# Patient Record
Sex: FEMALE | Race: BLACK OR AFRICAN AMERICAN | Employment: FULL TIME | URBAN - METROPOLITAN AREA
[De-identification: names, ages, dates, MRNs, and addresses within clinical notes are randomized per-mention and may not be internally consistent; named-entity substitution may affect disease eponyms.]

---

## 2017-01-05 RX ORDER — HYDROCHLOROTHIAZIDE 25 MG/1
TABLET ORAL
Qty: 30 TAB | Refills: 1 | Status: SHIPPED | OUTPATIENT
Start: 2017-01-05 | End: 2017-04-16 | Stop reason: SDUPTHER

## 2017-02-27 ENCOUNTER — OFFICE VISIT (OUTPATIENT)
Dept: INTERNAL MEDICINE CLINIC | Age: 57
End: 2017-02-27

## 2017-02-27 VITALS
HEIGHT: 63 IN | BODY MASS INDEX: 32.99 KG/M2 | TEMPERATURE: 98.1 F | SYSTOLIC BLOOD PRESSURE: 140 MMHG | RESPIRATION RATE: 16 BRPM | HEART RATE: 84 BPM | OXYGEN SATURATION: 97 % | WEIGHT: 186.2 LBS | DIASTOLIC BLOOD PRESSURE: 86 MMHG

## 2017-02-27 DIAGNOSIS — R05.9 COUGH: ICD-10-CM

## 2017-02-27 DIAGNOSIS — J01.10 ACUTE NON-RECURRENT FRONTAL SINUSITIS: Primary | ICD-10-CM

## 2017-02-27 RX ORDER — ALBUTEROL SULFATE 90 UG/1
2 AEROSOL, METERED RESPIRATORY (INHALATION)
Qty: 1 INHALER | Refills: 0 | Status: SHIPPED | OUTPATIENT
Start: 2017-02-27 | End: 2017-05-08 | Stop reason: ALTCHOICE

## 2017-02-27 RX ORDER — AMOXICILLIN AND CLAVULANATE POTASSIUM 875; 125 MG/1; MG/1
1 TABLET, FILM COATED ORAL 2 TIMES DAILY
Qty: 14 TAB | Refills: 0 | Status: SHIPPED | OUTPATIENT
Start: 2017-02-27 | End: 2017-03-06

## 2017-02-27 NOTE — PROGRESS NOTES
Chief Complaint   Patient presents with    Chills    Croup    Headache     Reviewed record in preparation for visit and have obtained necessary documentation. Identified pt with two pt identifiers(name and ).       Health Maintenance Due   Topic    Hepatitis C Screening     FOOT EXAM Q1     Pneumococcal 19-64 Medium Risk (1 of 1 - PPSV23)    FOBT Q 1 YEAR AGE 50-75     PAP AKA CERVICAL CYTOLOGY     INFLUENZA AGE 9 TO ADULT     BREAST CANCER SCRN MAMMOGRAM     HEMOGLOBIN A1C Q6M          Chief Complaint   Patient presents with    Chills    Croup    Headache        Wt Readings from Last 3 Encounters:   17 186 lb 3.2 oz (84.5 kg)   16 190 lb (86.2 kg)   16 193 lb (87.5 kg)     Temp Readings from Last 3 Encounters:   17 98.1 °F (36.7 °C) (Oral)   16 97.8 °F (36.6 °C) (Oral)   16 97.8 °F (36.6 °C) (Oral)     BP Readings from Last 3 Encounters:   17 140/86   16 132/90   16 131/87     Pulse Readings from Last 3 Encounters:   17 84   16 84   16 92           Learning Assessment:  :     Learning Assessment 2015   PRIMARY LEARNER Patient Patient Patient Patient   HIGHEST LEVEL OF EDUCATION - PRIMARY LEARNER  > 4 YEARS OF COLLEGE > 4 YEARS OF COLLEGE 2 YEARS OF COLLEGE -   BARRIERS PRIMARY LEARNER NONE NONE NONE -   CO-LEARNER CAREGIVER No No No -   PRIMARY LANGUAGE ENGLISH ENGLISH ENGLISH ENGLISH    NEED No No No -   LEARNER PREFERENCE PRIMARY DEMONSTRATION PICTURES DEMONSTRATION OTHER (COMMENT)     READING READING - -     - VIDEOS - -   LEARNING SPECIAL TOPICS no no no -   ANSWERED BY patient patient patient patient   RELATIONSHIP SELF SELF SELF SELF   ASSESSMENT COMMENT none none - -       Depression Screening:  :     PHQ 2 / 9, over the last two weeks 2016   Little interest or pleasure in doing things More than half the days   Feeling down, depressed or hopeless Several days   Total Score PHQ 2 3       Fall Risk Assessment:  :     No flowsheet data found. Abuse Screening:  :     Abuse Screening Questionnaire 8/4/2015 7/14/2014   Do you ever feel afraid of your partner? N N   Are you in a relationship with someone who physically or mentally threatens you? N N   Is it safe for you to go home? Y Y       Coordination of Care Questionnaire:  :     1) Have you been to an emergency room, urgent care clinic since your last visit? no   Hospitalized since your last visit? no             2) Have you seen or consulted any other health care providers outside of [address] [place] [address] since your last visit? no  (Include any pap smears or colon screenings in this section.)    3) Do you have an Advance Directive on file? no    4) Are you interested in receiving information on Advance Directives? NO      Patient is accompanied by self I have received verbal consent from Nate Saini to discuss any/all medical information while they are present in the room. Reviewed record  In preparation for visit and have obtained necessary documentation.

## 2017-02-27 NOTE — MR AVS SNAPSHOT
Visit Information Date & Time Provider Department Dept. Phone Encounter #  
 2/27/2017  9:45 AM [facility] [phone] Your Appointments 3/3/2017  9:00 AM  
ROUTINE CARE with MD [facility] ([facility]) Appt Note: 4 mths fup for diabetes [address] , [address] [place] [address]  
[address], [address] [phone] Upcoming Health Maintenance Date Due Hepatitis C Screening 1960 FOOT EXAM Q1 5/2/1968 Pneumococcal 19-64 Medium Risk (1 of 1 - PPSV23) 5/2/1977 FOBT Q 1 YEAR AGE 50-75 5/2/2008 PAP AKA CERVICAL CYTOLOGY 7/18/2015 INFLUENZA AGE 9 TO ADULT 8/1/2016 BREAST CANCER SCRN MAMMOGRAM 9/23/2016 HEMOGLOBIN A1C Q6M 3/8/2017 EYE EXAM RETINAL OR DILATED Q1 8/31/2017 MICROALBUMIN Q1 9/8/2017 LIPID PANEL Q1 9/8/2017 DTaP/Tdap/Td series (2 - Td) 1/1/2019 Allergies as of 2/27/2017  Review Complete On: 2/27/2017 By: Courtney Davis NP Severity Noted Reaction Type Reactions Ceclor [Cefaclor] High 07/06/2012    Rash Nimo Beery Sulfur High 06/21/2012    Anaphylaxis Azithromycin  06/21/2012    Other (comments) Stomach issues Azithromycin  07/06/2012    Nausea Only Pt states she is allergic to all mycin drugs. Ceclor [Cefaclor]  06/21/2012    Rash Ciprofloxacin  07/06/2012    Other (comments) Headache Ciprofloxacin (Bulk)  06/21/2012    Other (comments)  
 headache Lexapro [Escitalopram]  07/06/2012    Other (comments) Pt states she had arm pains and vision changes. Losartan  07/13/2015    Cough Sulfa (Sulfonamide Antibiotics)  07/06/2012    Shortness of Breath, Rash Tetracycline  06/21/2012    Other (comments) Stomach issues Tetracycline  07/06/2012    Nausea Only Zyrtec [Cetirizine]  07/14/2014   Systemic Other (comments)  Pt states that it makes her heart race Current Immunizations  Reviewed on 8/4/2015 Name Date Influenza Vaccine 10/8/2014, 10/1/2013 TB Skin Test (PPD) Intradermal 9/25/2013 TDAP Vaccine 1/1/2009 Not reviewed this visit You Were Diagnosed With   
  
 Codes Comments Acute non-recurrent frontal sinusitis    -  Primary ICD-10-CM: J01.10 ICD-9-CM: 423.3 Vitals BP  
  
  
  
  
  
 140/86 (BP 1 Location: Left arm, BP Patient Position: Sitting) BMI and BSA Data Body Mass Index Body Surface Area 32.98 kg/m 2 1.94 m 2 Preferred Pharmacy Pharmacy Name Phone [id number] Karine [place], [address] [facility] [phone] Your Updated Medication List  
  
   
This list is accurate as of: 2/27/17 10:07 AM.  Always use your most recent med list.  
  
  
  
  
 amoxicillin-clavulanate 875-125 mg per tablet Commonly known as:  AUGMENTIN Take 1 Tab by mouth two (2) times a day for 7 days. Blood-Glucose Meter monitoring kit Use as directed. Dx: E11.9 Calcium-Cholecalciferol (D3) 600 mg(1,500mg) -400 unit Cap Take  by mouth. FISH OIL 1,000 mg Cap Generic drug:  omega-3 fatty acids-vitamin e Take 1 Cap by mouth.  
  
 gabapentin 300 mg capsule Commonly known as:  NEURONTIN  
one tab qhs  
  
 glucose blood VI test strips strip Commonly known as:  ASCENSIA AUTODISC VI, ONE TOUCH ULTRA TEST VI  
Use to check blood sugar once daily. hydroCHLOROthiazide 25 mg tablet Commonly known as:  HYDRODIURIL  
TAKE ONE TABLET BY MOUTH ONE TIME DAILY * Lancets Misc Use as directed. Dx: E11.9  
  
 * ONETOUCH DELICA LANCETS 30 gauge Misc Generic drug:  lancets  
  
 magnesium 250 mg Tab Take  by mouth.  
  
 melatonin 3 mg tablet Take  by mouth.  
  
 metFORMIN 1,000 mg tablet Commonly known as:  GLUCOPHAGE Take 1 Tab by mouth two (2) times daily (with meals).   
  
 mometasone 50 mcg/actuation nasal spray Commonly known as:  NASONEX  
2 Sprays by Both Nostrils route daily. PROBIOTIC 4X 10-15 mg Tbec Generic drug:  B.infantis-B.ani-B.long-B.bifi Take  by mouth daily. turmeric root extract 500 mg Cap Take  by mouth. * Notice: This list has 2 medication(s) that are the same as other medications prescribed for you. Read the directions carefully, and ask your doctor or other care provider to review them with you. Prescriptions Sent to Pharmacy Refills  
 amoxicillin-clavulanate (AUGMENTIN) 875-125 mg per tablet 0 Sig: Take 1 Tab by mouth two (2) times a day for 7 days. Class: Normal  
 [facility] [address] [place] [address], [address] [place] [address] #: [phone] Route: Oral  
  
Patient Instructions Sinusitis: Care Instructions Your Care Instructions Sinusitis is an infection of the lining of the sinus cavities in your head. Sinusitis often follows a cold. It causes pain and pressure in your head and face. In most cases, sinusitis gets better on its own in 1 to 2 weeks. But some mild symptoms may last for several weeks. Sometimes antibiotics are needed. Follow-up care is a key part of your treatment and safety. Be sure to make and go to all appointments, and call your doctor if you are having problems. It's also a good idea to know your test results and keep a list of the medicines you take. How can you care for yourself at home? · Take an over-the-counter pain medicine, such as acetaminophen (Tylenol), ibuprofen (Advil, Motrin), or naproxen (Aleve). Read and follow all instructions on the label. · If the doctor prescribed antibiotics, take them as directed. Do not stop taking them just because you feel better. You need to take the full course of antibiotics. · Be careful when taking over-the-counter cold or flu medicines and Tylenol at the same time.  Many of these medicines have acetaminophen, which is Tylenol. Read the labels to make sure that you are not taking more than the recommended dose. Too much acetaminophen (Tylenol) can be harmful. · Breathe warm, moist air from a steamy shower, a hot bath, or a sink filled with hot water. Avoid cold, dry air. Using a humidifier in your home may help. Follow the directions for cleaning the machine. · Use saline (saltwater) nasal washes to help keep your nasal passages open and wash out mucus and bacteria. You can buy saline nose drops at a grocery store or [employer]ort[employer]. Or you can make your own at home by adding 1 teaspoon of salt and 1 teaspoon of baking soda to 2 cups of distilled water. If you make your own, fill a bulb syringe with the solution, insert the tip into your nostril, and squeeze gently. Geraldean Catalino your nose. · Put a hot, wet towel or a warm gel pack on your face 3 or 4 times a day for 5 to 10 minutes each time. · Try a decongestant nasal spray like oxymetazoline (Afrin). Do not use it for more than 3 days in a row. Using it for more than 3 days can make your congestion worse. When should you call for help? Call your doctor now or seek immediate medical care if: 
· You have new or worse swelling or redness in your face or around your eyes. · You have a new or higher fever. Watch closely for changes in your health, and be sure to contact your doctor if: 
· You have new or worse facial pain. · The mucus from your nose becomes thicker (like pus) or has new blood in it. · You are not getting better as expected. Where can you learn more? Go to http://cotlen-kelly.info/. Enter L643 in the search box to learn more about \"Sinusitis: Care Instructions. \" Current as of: July 29, 2016 Content Version: 11.1 © 8111-4960 [employer]. Care instructions adapted under license by [employer] (which disclaims liability or warranty for this information).  If you have questions about a medical condition or this instruction, always ask your healthcare professional. [address] any warranty or liability for your use of this information. Introducing [facility] & HEALTH SERVICES! Dear Gita Hale: Thank you for requesting a Vicampo account. Our records indicate that you already have an active Vicampo account. You can access your account anytime at [email or web address] Did you know that you can access your hospital and ER discharge instructions at any time in Vicampo? You can also review all of your test results from your hospital stay or ER visit. Additional Information If you have questions, please visit the Frequently Asked Questions section of the Vicampo website at [email or web address] Remember, Vicampo is NOT to be used for urgent needs. For medical emergencies, dial 911. Now available from your iPhone and Android! Please provide this summary of care documentation to your next provider. Your primary care clinician is listed as Israel Hester. If you have any questions after today's visit, please call [phone].

## 2017-02-27 NOTE — PROGRESS NOTES
HISTORY OF PRESENT ILLNESS  Teodora Dao is a 60 y.o. female. Patient reports for returned sinus congestion and headache, productive cough, chills, some wheezing. Patient had flu about 3 weeks ago and felt better for a couple days then 2 days ago got worse again. Patient states inhaler helped with the cough and wheezing, but it is  and would like a new one. Visit Vitals    /86 (BP 1 Location: Left arm, BP Patient Position: Sitting)    Pulse 84    Temp 98.1 °F (36.7 °C) (Oral)    Resp 16    Ht 5' 3\" (1.6 m)    Wt 186 lb 3.2 oz (84.5 kg)    SpO2 97%    BMI 32.98 kg/m2       Chills    Associated symptoms include congestion, headaches (sinus) and cough. Headache   Associated symptoms include headaches (sinus). Cough   The history is provided by the patient. This is a new problem. Episode onset: 3 weeks ago-diagnosed with flu. The problem occurs constantly. Progression since onset: was feeling better for a couple days then got significantly worse again 2 days ago. Associated symptoms include headaches (sinus). Treatments tried: albuterol, nasal rinse, claritin. The treatment provided mild relief. Review of Systems   Constitutional: Positive for chills and malaise/fatigue. HENT: Positive for congestion. Respiratory: Positive for cough and sputum production. Neurological: Positive for headaches (sinus). Physical Exam   Constitutional: She is oriented to person, place, and time. She appears well-developed and well-nourished. HENT:   Head: Normocephalic. Right Ear: Hearing, external ear and ear canal normal.   Left Ear: Hearing, external ear and ear canal normal.   Nose: Mucosal edema present. Right sinus exhibits frontal sinus tenderness. Left sinus exhibits frontal sinus tenderness. Mouth/Throat: Uvula is midline, oropharynx is clear and moist and mucous membranes are normal.   Both TM with distorted light reflex and fluid behind   Neck: Normal range of motion.  Neck supple. Cardiovascular: Normal rate, regular rhythm and normal heart sounds. Pulmonary/Chest: Effort normal and breath sounds normal.   Lymphadenopathy:     She has no cervical adenopathy. Neurological: She is alert and oriented to person, place, and time. Skin: Skin is warm and dry. Psychiatric: She has a normal mood and affect. ASSESSMENT and PLAN    ICD-10-CM ICD-9-CM    1. Acute non-recurrent frontal sinusitis J01.10 461.1    2.  Cough R05 786.2      Orders Placed This Encounter    amoxicillin-clavulanate (AUGMENTIN) 875-125 mg per tablet    albuterol (PROVENTIL HFA, VENTOLIN HFA, PROAIR HFA) 90 mcg/actuation inhaler   continue nasal spray, nasal rinses, and claritin  Continue probiotic

## 2017-02-27 NOTE — PATIENT INSTRUCTIONS
Sinusitis: Care Instructions  Your Care Instructions    Sinusitis is an infection of the lining of the sinus cavities in your head. Sinusitis often follows a cold. It causes pain and pressure in your head and face. In most cases, sinusitis gets better on its own in 1 to 2 weeks. But some mild symptoms may last for several weeks. Sometimes antibiotics are needed. Follow-up care is a key part of your treatment and safety. Be sure to make and go to all appointments, and call your doctor if you are having problems. It's also a good idea to know your test results and keep a list of the medicines you take. How can you care for yourself at home? · Take an over-the-counter pain medicine, such as acetaminophen (Tylenol), ibuprofen (Advil, Motrin), or naproxen (Aleve). Read and follow all instructions on the label. · If the doctor prescribed antibiotics, take them as directed. Do not stop taking them just because you feel better. You need to take the full course of antibiotics. · Be careful when taking over-the-counter cold or flu medicines and Tylenol at the same time. Many of these medicines have acetaminophen, which is Tylenol. Read the labels to make sure that you are not taking more than the recommended dose. Too much acetaminophen (Tylenol) can be harmful. · Breathe warm, moist air from a steamy shower, a hot bath, or a sink filled with hot water. Avoid cold, dry air. Using a humidifier in your home may help. Follow the directions for cleaning the machine. · Use saline (saltwater) nasal washes to help keep your nasal passages open and wash out mucus and bacteria. You can buy saline nose drops at a grocery store or drugstore. Or you can make your own at home by adding 1 teaspoon of salt and 1 teaspoon of baking soda to 2 cups of distilled water. If you make your own, fill a bulb syringe with the solution, insert the tip into your nostril, and squeeze gently. Coralyn Waverly your nose.   · Put a hot, wet towel or a warm gel pack on your face 3 or 4 times a day for 5 to 10 minutes each time. · Try a decongestant nasal spray like oxymetazoline (Afrin). Do not use it for more than 3 days in a row. Using it for more than 3 days can make your congestion worse. When should you call for help? Call your doctor now or seek immediate medical care if:  · You have new or worse swelling or redness in your face or around your eyes. · You have a new or higher fever. Watch closely for changes in your health, and be sure to contact your doctor if:  · You have new or worse facial pain. · The mucus from your nose becomes thicker (like pus) or has new blood in it. · You are not getting better as expected. Where can you learn more? Go to http://colten-kelly.info/. Enter A210 in the search box to learn more about \"Sinusitis: Care Instructions. \"  Current as of: July 29, 2016  Content Version: 11.1  © 3509-5359 [employer]. Care instructions adapted under license by [employer] (which disclaims liability or warranty for this information). If you have questions about a medical condition or this instruction, always ask your healthcare professional. [place] [address] any warranty or liability for your use of this information.

## 2017-03-03 ENCOUNTER — OFFICE VISIT (OUTPATIENT)
Dept: INTERNAL MEDICINE CLINIC | Age: 57
End: 2017-03-03

## 2017-03-03 VITALS
SYSTOLIC BLOOD PRESSURE: 126 MMHG | OXYGEN SATURATION: 97 % | WEIGHT: 186.6 LBS | HEART RATE: 82 BPM | RESPIRATION RATE: 16 BRPM | BODY MASS INDEX: 33.06 KG/M2 | TEMPERATURE: 97.3 F | DIASTOLIC BLOOD PRESSURE: 78 MMHG | HEIGHT: 63 IN

## 2017-03-03 DIAGNOSIS — E83.52 HYPERCALCEMIA: ICD-10-CM

## 2017-03-03 DIAGNOSIS — I10 BENIGN ESSENTIAL HYPERTENSION: ICD-10-CM

## 2017-03-03 DIAGNOSIS — Z12.39 BREAST CANCER SCREENING: ICD-10-CM

## 2017-03-03 DIAGNOSIS — Z11.59 NEED FOR HEPATITIS C SCREENING TEST: ICD-10-CM

## 2017-03-03 DIAGNOSIS — R79.89 ELEVATED LFTS: ICD-10-CM

## 2017-03-03 DIAGNOSIS — E11.9 CONTROLLED TYPE 2 DIABETES MELLITUS WITHOUT COMPLICATION, WITHOUT LONG-TERM CURRENT USE OF INSULIN (HCC): Primary | ICD-10-CM

## 2017-03-03 NOTE — PATIENT INSTRUCTIONS
WEIGHT LOSS RECOMMENDATIONS:    [place] [facility]vised Weight Loss Program:   - Multidisciplinary & holistic approach to your weight loss   - [phone]    Jameson Troncoso:               - [address] [facility]. [place], [place]   - [phone]   - [email or web address] com   - 3 month initial course   - Initial fee + monthly membership fee    [place] [facility] Jesse Altsherry   - Dr Royce Boston   - Cielo Pacheco. [place], [place]   - [phone]   - www. [employer]. [facility]     [facility] PREP Program (Physician Recommend Exercise Program   - $60 for 60 days    [facility]: Healthy Lifestyle   -Run by Dr Solomon Cox at [facility]   -Once a month sessions, 10-12 patients   -FREE    Weight Watchers:   - See website    Humberto Mojica:   - See website    New Technology:   - My Jason Katerine or other Apps for your phone   - FitBit or FuelBand    Medications:   - Contrave    - Qsymia   - Belviq              - Saxenda      Aerobic exercise: goal of 3-5 times per week, about 30 minutes    Diet changes: limiting daily calorie intake to 2,000. Work on reading nutrition labels on food (in particular the serving size, the calories per serving, and carbohydrates). Work on decreasing portion sizes & snacking.

## 2017-03-03 NOTE — MR AVS SNAPSHOT
Visit Information Date & Time Provider Department Dept. Phone Encounter #  
 3/3/2017  9:00 AM 69MD Saad [facility]  [facility] [phone] [id number] Follow-up Instructions Return in about 3 months (around 6/3/2017) for Establish care with new provider for diabets. Upcoming Health Maintenance Date Due Hepatitis C Screening 1960 FOOT EXAM Q1 5/2/1968 Pneumococcal 19-64 Medium Risk (1 of 1 - PPSV23) 5/2/1977 FOBT Q 1 YEAR AGE 50-75 5/2/2008 PAP AKA CERVICAL CYTOLOGY 7/18/2015 INFLUENZA AGE 9 TO ADULT 8/1/2016 BREAST CANCER SCRN MAMMOGRAM 9/23/2016 HEMOGLOBIN A1C Q6M 3/8/2017 EYE EXAM RETINAL OR DILATED Q1 8/31/2017 MICROALBUMIN Q1 9/8/2017 LIPID PANEL Q1 9/8/2017 DTaP/Tdap/Td series (2 - Td) 1/1/2019 Allergies as of 3/3/2017  Review Complete On: 3/3/2017 By: [id number], MD  
  
 Severity Noted Reaction Type Reactions Ceclor [Cefaclor] High 07/06/2012    Rash Brie Twan Sulfur High 06/21/2012    Anaphylaxis Azithromycin  06/21/2012    Other (comments) Stomach issues Azithromycin  07/06/2012    Nausea Only Pt states she is allergic to all mycin drugs. Ceclor [Cefaclor]  06/21/2012    Rash Ciprofloxacin  07/06/2012    Other (comments) Headache Ciprofloxacin (Bulk)  06/21/2012    Other (comments)  
 headache Lexapro [Escitalopram]  07/06/2012    Other (comments) Pt states she had arm pains and vision changes. Losartan  07/13/2015    Cough Sulfa (Sulfonamide Antibiotics)  07/06/2012    Shortness of Breath, Rash Tetracycline  06/21/2012    Other (comments) Stomach issues Tetracycline  07/06/2012    Nausea Only Zyrtec [Cetirizine]  07/14/2014   Systemic Other (comments) Pt states that it makes her heart race Current Immunizations  Reviewed on 3/3/2017 Name Date Influenza Vaccine 11/1/2016, 10/8/2014, 10/1/2013  TB Skin Test (PPD) Intradermal 9/25/2013 TDAP Vaccine 1/1/2009 Reviewed by Maye Pierre MD on 3/3/2017 at  9:53 AM  
 Reviewed by Maye Pierre MD on 3/3/2017 at  9:54 AM  
You Were Diagnosed With   
  
 Codes Comments Controlled type 2 diabetes mellitus without complication, without long-term current use of insulin ([facility]    -  Primary ICD-10-CM: E11.9 ICD-9-CM: 250.00 Benign essential hypertension     ICD-10-CM: I10 
ICD-9-CM: 401.1 Elevated LFTs     ICD-10-CM: R79.89 ICD-9-CM: 790.6 Hypercalcemia     ICD-10-CM: T61.91 
ICD-9-CM: 275.42 Need for hepatitis C screening test     ICD-10-CM: Z11.59 
ICD-9-CM: V73.89 Breast cancer screening     ICD-10-CM: Z12.39 
ICD-9-CM: V76.10 Vitals BP  
  
  
  
  
  
 126/78 (BP 1 Location: Left arm, BP Patient Position: Sitting) Vitals History BMI and BSA Data Body Mass Index Body Surface Area 33.05 kg/m 2 1.94 m 2 Preferred Pharmacy Pharmacy Name Phone [id number], [address] [place] [address] [phone] Your Updated Medication List  
  
   
This list is accurate as of: 3/3/17  9:59 AM.  Always use your most recent med list.  
  
  
  
  
 albuterol 90 mcg/actuation inhaler Commonly known as:  PROVENTIL HFA, VENTOLIN HFA, PROAIR HFA Take 2 Puffs by inhalation every six (6) hours as needed for Wheezing. amoxicillin-clavulanate 875-125 mg per tablet Commonly known as:  AUGMENTIN Take 1 Tab by mouth two (2) times a day for 7 days. Blood-Glucose Meter monitoring kit Use as directed. Dx: E11.9 Calcium-Cholecalciferol (D3) 600 mg(1,500mg) -400 unit Cap Take  by mouth. FISH OIL 1,000 mg Cap Generic drug:  omega-3 fatty acids-vitamin e Take 2 Caps by mouth.  
  
 gabapentin 300 mg capsule Commonly known as:  NEURONTIN  
one tab qhs  
  
 glucose blood VI test strips strip Commonly known as:  ASCENSIA AUTODISC VI, ONE TOUCH ULTRA TEST VI Use to check blood sugar once daily. hydroCHLOROthiazide 25 mg tablet Commonly known as:  HYDRODIURIL  
TAKE ONE TABLET BY MOUTH ONE TIME DAILY * Lancets Misc Use as directed. Dx: E11.9  
  
 * ONETOUCH DELICA LANCETS 30 gauge Misc Generic drug:  lancets  
  
 magnesium 250 mg Tab Take  by mouth.  
  
 melatonin 3 mg tablet Take  by mouth.  
  
 metFORMIN 1,000 mg tablet Commonly known as:  GLUCOPHAGE Take 1 Tab by mouth two (2) times daily (with meals). mometasone 50 mcg/actuation nasal spray Commonly known as:  NASONEX  
2 Sprays by Both Nostrils route daily. PROBIOTIC 4X 10-15 mg Tbec Generic drug:  B.infantis-B.ani-B.long-B.bifi Take  by mouth daily. turmeric root extract 500 mg Cap Take  by mouth. * Notice: This list has 2 medication(s) that are the same as other medications prescribed for you. Read the directions carefully, and ask your doctor or other care provider to review them with you. We Performed the Following HEMOGLOBIN A1C WITH EAG [52508 CPT(R)] HEPATITIS C AB [20680 CPT(R)] LIPID PANEL [09967 CPT(R)] METABOLIC PANEL, COMPREHENSIVE [83766 CPT(R)] PTH INTACT [37995 CPT(R)] VITAMIN D, 25 HYDROXY [id number] CPT(R)] Follow-up Instructions Return in about 3 months (around 6/3/2017) for Establish care with new provider for diabets. To-Do List   
 03/06/2017 Imaging:  [facility] MAMMO BI SCREENING INCL CAD Patient Instructions WEIGHT LOSS RECOMMENDATIONS: 
 
Soumya Noriega Medically Supervised Weight Loss Program: - Multidisciplinary & holistic approach to your weight loss - [phone] Johnny Mejia:  
            - [address] [facility]. [place], [place] 
 - [phone] 
 - [email or web address] 
 - 3 month initial course - Initial fee + monthly membership fee [facility] - Dr Breonna Osorio 
 - [address]est[address] Yip. [place], [place] 
 - [phone] - www. [employer] ACAC PREP Program (Physician Recommend Exercise Program 
 - $60 for 60 days Mal Molina [facility] - Patient Engagement Workshop: Healthy Lifestyle 
 -Run by Dr Yanely Velazquez at [facility] 
 -Once a month sessions, 10-12 patients -FREE Weight Watchers: 
 - See website Maria L Allen: - See website [employer]: - My Fitness Pal or other Apps for your phone - FitBit or FuelBand Medications: 
 - Contrave - Qsymia - Belviq 
            - Saxenda Aerobic exercise: goal of 3-5 times per week, about 30 minutes Diet changes: limiting daily calorie intake to 2,000. Work on reading nutrition labels on food (in particular the serving size, the calories per serving, and carbohydrates). Work on decreasing portion sizes & snacking. Introducing [facility]! Dear Saba Go: Thank you for requesting a Volly account. Our records indicate that you already have an active Volly account. You can access your account anytime at [email or web address] Did you know that you can access your hospital and ER discharge instructions at any time in Volly? You can also review all of your test results from your hospital stay or ER visit. Additional Information If you have questions, please visit the Frequently Asked Questions section of the Volly website at [email or web address] Remember, Volly is NOT to be used for urgent needs. For medical emergencies, dial 911. Now available from your iPhone and Android! Please provide this summary of care documentation to your next provider. Your primary care clinician is listed as [address] If you have any questions after today's visit, please call [phone].

## 2017-03-03 NOTE — PROGRESS NOTES
Mireya Lyons is a 60 y.o. female  Chief Complaint   Patient presents with    Diabetes     1. Have you been to the ER, urgent care clinic since your last visit? Hospitalized since your last visit? No    2. Have you seen or consulted any other health care providers outside of the [address] [place] [address] since your last visit? Include any pap smears or colon screening.   No

## 2017-03-04 LAB
25(OH)D3+25(OH)D2 SERPL-MCNC: 57.3 NG/ML (ref 30–100)
ALBUMIN SERPL-MCNC: 4.4 G/DL (ref 3.5–5.5)
ALBUMIN/GLOB SERPL: 1.5 {RATIO} (ref 1.1–2.5)
ALP SERPL-CCNC: 70 IU/L (ref 39–117)
ALT SERPL-CCNC: 86 IU/L (ref 0–32)
AST SERPL-CCNC: 86 IU/L (ref 0–40)
BILIRUB SERPL-MCNC: 0.4 MG/DL (ref 0–1.2)
BUN SERPL-MCNC: 17 MG/DL (ref 6–24)
BUN/CREAT SERPL: 27 (ref 9–23)
CALCIUM SERPL-MCNC: 10.5 MG/DL (ref 8.7–10.2)
CHLORIDE SERPL-SCNC: 97 MMOL/L (ref 96–106)
CHOLEST SERPL-MCNC: 194 MG/DL (ref 100–199)
CO2 SERPL-SCNC: 24 MMOL/L (ref 18–29)
CREAT SERPL-MCNC: 0.64 MG/DL (ref 0.57–1)
EST. AVERAGE GLUCOSE BLD GHB EST-MCNC: 160 MG/DL
GLOBULIN SER CALC-MCNC: 2.9 G/DL (ref 1.5–4.5)
GLUCOSE SERPL-MCNC: 101 MG/DL (ref 65–99)
HBA1C MFR BLD: 7.2 % (ref 4.8–5.6)
HCV AB S/CO SERPL IA: <0.1 S/CO RATIO (ref 0–0.9)
HDLC SERPL-MCNC: 49 MG/DL
INTERPRETATION, 910389: NORMAL
LDLC SERPL CALC-MCNC: 133 MG/DL (ref 0–99)
Lab: NORMAL
POTASSIUM SERPL-SCNC: 4 MMOL/L (ref 3.5–5.2)
PROT SERPL-MCNC: 7.3 G/DL (ref 6–8.5)
PTH-INTACT SERPL-MCNC: 11 PG/ML (ref 15–65)
SODIUM SERPL-SCNC: 141 MMOL/L (ref 134–144)
TRIGL SERPL-MCNC: 62 MG/DL (ref 0–149)
VLDLC SERPL CALC-MCNC: 12 MG/DL (ref 5–40)

## 2017-03-05 ENCOUNTER — TELEPHONE (OUTPATIENT)
Dept: INTERNAL MEDICINE CLINIC | Age: 57
End: 2017-03-05

## 2017-03-06 NOTE — PROGRESS NOTES
Note low PTH and elevated calcium. Pt takes calcium + D supplement and is on HCTZ, has seen Dr. Danielle in the past for hypercalcemia. I have requested her notes and labs.

## 2017-03-09 NOTE — PROGRESS NOTES
The following Reviewspotter message was sent to patient:    A few concerns on your lab work:    1)  Your calcium level remains high and your PTH level is low. I requested the note from Dr. Juel Cheadle, you were last seen in 2014 but nothing was mentioned about the calcium level at that time so I'm not sure what past discussion was had about this. For now - I recommend to stop your calcium supplement as we discussed and then repeat the lab work in a month. If there is no improvement, I will need to take you off of your current blood pressure pill - the Hydrochlorothiazide, as this can raise calcium levels as well. 2)  Your liver tests remain elevated. The numbers are slightly higher than 6 months ago. The hepatitis C screening test was negative. The liver numbers are most likely high from fatty liver disease. I recommend to do a liver ultrasound to see if there are clues about this. I have placed the order, please call [phone] to schedule your test.     3)  Your cholesterol remains elevated. This is contributing to the fatty changes likely in your liver. As you know, it is recommended for diabetics to keep an LDL cholesterol level under 100. I recommend to start a cholesterol lowering medication. I know this is not something you are eager to do so I will hold off on sending to your pharmacy until I hear from you. 4)  Your A1c is 7.2 %. I recommend to keep the Metformin at the current dose and add a second diabetes medication in order to reach your goal.  We discussed adding an SGLT2 inhibitor medication. One such of these medicines is called [place]. I went ahead and sent in a low dose of this medication to your pharmacy. It should be taken once a day. If you have any trouble with the cost (your insurance may approve a different brand), I can change this or we can help you find a coupon code to get it covered. It was a pleasure taking care of you.   Please let me know if you have any questions or concerns. My last day at [facility]. 6. Internists will be May 26th.

## 2017-04-17 RX ORDER — HYDROCHLOROTHIAZIDE 25 MG/1
TABLET ORAL
Qty: 30 TAB | Refills: 0 | Status: SHIPPED | OUTPATIENT
Start: 2017-04-17 | End: 2017-05-08 | Stop reason: ALTCHOICE

## 2017-04-25 RX ORDER — METFORMIN HYDROCHLORIDE 500 MG/1
TABLET ORAL
Qty: 270 TAB | Refills: 3 | Status: SHIPPED | OUTPATIENT
Start: 2017-04-25 | End: 2017-05-08 | Stop reason: ALTCHOICE

## 2017-04-25 RX ORDER — METFORMIN HYDROCHLORIDE 1000 MG/1
1000 TABLET ORAL 2 TIMES DAILY WITH MEALS
Qty: 180 TAB | Refills: 1 | Status: CANCELLED | OUTPATIENT
Start: 2017-04-25

## 2017-05-08 ENCOUNTER — OFFICE VISIT (OUTPATIENT)
Dept: INTERNAL MEDICINE CLINIC | Age: 57
End: 2017-05-08

## 2017-05-08 VITALS
TEMPERATURE: 97 F | SYSTOLIC BLOOD PRESSURE: 130 MMHG | HEART RATE: 69 BPM | WEIGHT: 186 LBS | HEIGHT: 63 IN | OXYGEN SATURATION: 94 % | DIASTOLIC BLOOD PRESSURE: 90 MMHG | RESPIRATION RATE: 16 BRPM | BODY MASS INDEX: 32.96 KG/M2

## 2017-05-08 DIAGNOSIS — R74.8 ELEVATED LIVER ENZYMES: ICD-10-CM

## 2017-05-08 DIAGNOSIS — E55.9 VITAMIN D DEFICIENCY: ICD-10-CM

## 2017-05-08 DIAGNOSIS — E11.9 CONTROLLED TYPE 2 DIABETES MELLITUS WITHOUT COMPLICATION, WITHOUT LONG-TERM CURRENT USE OF INSULIN (HCC): Primary | ICD-10-CM

## 2017-05-08 DIAGNOSIS — B37.9 YEAST INFECTION: ICD-10-CM

## 2017-05-08 DIAGNOSIS — E83.52 HYPERCALCEMIA: ICD-10-CM

## 2017-05-08 RX ORDER — METFORMIN HYDROCHLORIDE 500 MG/1
500 TABLET ORAL 2 TIMES DAILY WITH MEALS
Qty: 180 TAB | Refills: 3 | Status: SHIPPED | OUTPATIENT
Start: 2017-05-08 | End: 2017-07-21

## 2017-05-08 RX ORDER — FLUCONAZOLE 150 MG/1
150 TABLET ORAL DAILY
Qty: 1 TAB | Refills: 0 | Status: SHIPPED | OUTPATIENT
Start: 2017-05-08 | End: 2017-05-09

## 2017-05-08 NOTE — PROGRESS NOTES
Establish Care (switch from [facility] )       HPI:  Lyndon Caldwell is a 59y.o. year old female who is here to establish care. 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 She reports the following history and medical concerns:      DM (medication induced as per patient- steroids both injection and po steroids- was prediabetic 2013 a1c 6.3)- after injections for back pain. Blood sugars went up. Lab Results   Component Value Date/Time    Hemoglobin A1c 7.2 03/03/2017 10:08 AM    Hemoglobin A1c (POC) 6.6 02/05/2016 12:45 PM       7.2 is the highest I ever been. Started on Invokana- has itching and yeast infection. Was on metformin and had it increased 2000 mg once a day. That made her bloated. HTN- hctz (stopped 2 weeks ago)  Liver enzymes was going up. Losartan 25 mg caused a cough. Elevated liver enzymes- past 3 years. Doesn't want to get ultrasound because of cost. Hep C negative. Just had back surgery June 2016. (continue off hctz)        Assessment and Plan        1. Controlled type 2 diabetes mellitus without complication, without long-term current use of insulin ([address]  Getting side effects from Invokanna. Go back to metformin er 500 bid. - MICROALBUMIN, UR, RAND W/ MICROALBUMIN/CREA RATIO; Future  - URINALYSIS W/ RFLX MICROSCOPIC; Future  - METABOLIC PANEL, COMPREHENSIVE; Future  - LIPID PANEL; Future  - CBC WITH AUTOMATED DIFF; Future  - HEMOGLOBIN A1C W/O EAG; Future    2. Yeast infection  Diflucan sent in x 1    3. Hypercalcemia  Recheck calcium. Hx of low PTH and high calcium.  - PTH INTACT; Future  - VITAMIN D, 25 HYDROXY; Future  - TSH REFLEX TO T4; Future    4. Elevated liver enzymes  Recheck LFT.    - GGT; Future  - SAHARA QL, W/REFLEX CASCADE; Future    5. Vitamin D deficiency  Patient compliant with Vit D. Emphasized importance of taking with food and not too much because it can be toxic to our body.   Therefore, it should be checked regularly. Levels ordered. - VITAMIN D, 25 HYDROXY; Future          Visit Vitals    /90 (BP 1 Location: Left arm, BP Patient Position: Sitting)    Pulse 69    Temp 97 °F (36.1 °C) (Oral)    Resp 16    Ht 5' 3\" (1.6 m)    Wt 186 lb (84.4 kg)    SpO2 94%    BMI 32.95 kg/m2       Historical Data    Past Medical History:   Diagnosis Date    Anemia     Diabetes mellitus type 2, controlled ([address] 2015    H/O bone density study 10/12    normal    H/O seasonal allergies     Hx of mammogram 12    Hypercalcemia 2017    Other and unspecified hyperlipidemia 2015    Pap smear for cervical cancer screening        Past Surgical History:   Procedure Laterality Date    HX  SECTION  2238,3202    HX CYST REMOVAL      HX CYST REMOVAL      right wrist    HX GI      colonoscopy    HX LIPECTOMY  2001    HX ORTHOPAEDIC  2016    back surgery    HX OTHER SURGICAL      surgery to remove plantar wart    HX TONSILLECTOMY      HX TONSILLECTOMY  1967    MULTIPLE DELIVERY          Outpatient Encounter Prescriptions as of 2017   Medication Sig Dispense Refill    metFORMIN (GLUCOPHAGE) 500 mg tablet Take 1 Tab by mouth two (2) times daily (with meals). 180 Tab 3    fluconazole (DIFLUCAN) 150 mg tablet Take 1 Tab by mouth daily for 1 day. FDA advises cautious prescribing of oral fluconazole in pregnancy. 1 Tab 0    B.infantis-B.ani-B.long-B.bifi (PROBIOTIC 4X) 10-15 mg TbEC Take  by mouth daily.  ONETOUCH DELICA LANCETS 30 gauge misc       Calcium-Cholecalciferol, D3, 600 mg(1,500mg) -400 unit cap Take  by mouth.  glucose blood VI test strips (ASCENSIA AUTODISC VI, ONE TOUCH ULTRA TEST VI) strip Use to check blood sugar once daily. 100 Strip 1    Blood-Glucose Meter monitoring kit Use as directed. Dx: E11.9 1 Kit 0    Lancets misc Use as directed.  Dx: E11.9 100 Each 1    mometasone (NASONEX) 50 mcg/actuation nasal spray 2 Sprays by Both Nostrils route daily. 1 Container 3    magnesium 250 mg Tab Take  by mouth.  omega-3 fatty acids-vitamin e (FISH OIL) 1,000 mg Cap Take 2 Caps by mouth.  turmeric root extract 500 mg Cap Take  by mouth.  melatonin 3 mg tablet Take  by mouth.  [DISCONTINUED] metFORMIN (GLUCOPHAGE) 500 mg tablet Take 2 tablets with breakfast and 1 tablet with dinner 270 Tab 3    [DISCONTINUED] hydroCHLOROthiazide (HYDRODIURIL) 25 mg tablet TAKE ONE TABLET BY MOUTH ONE TIME DAILY 30 Tab 0    [DISCONTINUED] canagliflozin (INVOKANA) 100 mg tablet Take 1 Tab by mouth Daily (before breakfast). 90 Tab 1    [DISCONTINUED] albuterol (PROVENTIL HFA, VENTOLIN HFA, PROAIR HFA) 90 mcg/actuation inhaler Take 2 Puffs by inhalation every six (6) hours as needed for Wheezing. 1 Inhaler 0    [DISCONTINUED] gabapentin (NEURONTIN) 300 mg capsule one tab qhs  7     No facility-administered encounter medications on file as of 5/8/2017. Allergies   Allergen Reactions    Ceclor [Cefaclor] Rash     Robert's Amos    Sulfur Anaphylaxis    Azithromycin Other (comments)     Stomach issues    Azithromycin Nausea Only     Pt states she is allergic to all mycin drugs.  Ceclor [Cefaclor] Rash    Ciprofloxacin Other (comments)     Headache    Ciprofloxacin (Bulk) Other (comments)     headache    Lexapro [Escitalopram] Other (comments)     Pt states she had arm pains and vision changes.     Losartan Cough    Sulfa (Sulfonamide Antibiotics) Shortness of Breath and Rash    Tetracycline Other (comments)     Stomach issues      Tetracycline Nausea Only    Zyrtec [Cetirizine] Other (comments)     Pt states that it makes her heart race        Social History     Social History    Marital status: SINGLE     Spouse name: Single    Number of children: 2    Years of education: BA     Occupational History    NP      [employer] [employer]     Social History Main Topics    Smoking status: Never Smoker    Smokeless tobacco: Never Used    Alcohol use No      Comment: occassionally--every other week    Drug use: No    Sexual activity: Not Currently     Other Topics Concern    Not on file     Social History Narrative    ** Merged History Encounter **             Review of Systems   Constitutional: Negative for weight loss. Eyes: Negative for blurred vision. Respiratory: Negative for shortness of breath. Cardiovascular: Negative for chest pain. Gastrointestinal: Negative for abdominal pain. Genitourinary: Negative for dysuria and frequency. Skin: Negative for rash. Neurological: Negative for dizziness, weakness and headaches. Physical Exam   Constitutional: She appears well-developed and well-nourished. She is active. Non-toxic appearance. She does not have a sickly appearance. She does not appear ill. No distress. Eyes: Conjunctivae are normal.   Cardiovascular: Normal rate, regular rhythm, S1 normal, S2 normal, normal heart sounds and normal pulses. Exam reveals no gallop and no friction rub. Pulmonary/Chest: Effort normal and breath sounds normal. No respiratory distress. Abdominal: Soft. Bowel sounds are normal.   Musculoskeletal: She exhibits no edema or deformity. Neurological: She is alert. Skin: Skin is warm and dry. No rash noted. No pallor. Psychiatric: She has a normal mood and affect.  Her behavior is normal.      Ortho Exam       Orders Placed This Encounter    PTH INTACT     Standing Status:   Future     Standing Expiration Date:   8/29/2017    MICROALBUMIN, UR, RAND W/ MICROALBUMIN/CREA RATIO     Standing Status:   Future     Standing Expiration Date:   8/29/2017    VITAMIN D, 25 HYDROXY     Standing Status:   Future     Standing Expiration Date:   8/29/2017    TSH REFLEX TO T4     Standing Status:   Future     Standing Expiration Date:   8/29/2017    URINALYSIS W/ RFLX MICROSCOPIC     Standing Status:   Future     Standing Expiration Date:   6/77/9484    METABOLIC PANEL, COMPREHENSIVE     Standing Status:   Future     Standing Expiration Date:   8/29/2017    LIPID PANEL     Standing Status:   Future     Standing Expiration Date:   8/29/2017    CBC WITH AUTOMATED DIFF     Standing Status:   Future     Standing Expiration Date:   8/29/2017    GGT     Standing Status:   Future     Standing Expiration Date:   8/29/2017    SAHARA QL, W/REFLEX CASCADE     Standing Status:   Future     Standing Expiration Date:   8/29/2017    HEMOGLOBIN A1C W/O EAG     Standing Status:   Future     Standing Expiration Date:   8/8/2017    metFORMIN (GLUCOPHAGE) 500 mg tablet     Sig: Take 1 Tab by mouth two (2) times daily (with meals). Dispense:  180 Tab     Refill:  3    fluconazole (DIFLUCAN) 150 mg tablet     Sig: Take 1 Tab by mouth daily for 1 day. FDA advises cautious prescribing of oral fluconazole in pregnancy. Dispense:  1 Tab     Refill:  0        I have reviewed the patient's medical history in detail and updated the computerized patient record. We had a prolonged discussion about these complex clinical issues and went over the various important aspects to consider. All questions were answered. Advised her to call back or return to office if symptoms do not improve, change in nature, or persist.    She was given an after visit summary or informed of [facility] which includes patient instructions, diagnoses, current medications, & vitals. She expressed understanding with the diagnosis and plan.

## 2017-05-08 NOTE — PROGRESS NOTES
Chief Complaint   Patient presents with   Wilner Ulloa Establish Care     switch from [facility]      1. Have you been to the ER, urgent care clinic since your last visit? No  Hospitalized since your last visit? No    2. Have you seen or consulted any other health care providers outside of the [address] [place] [address] since your last visit? No  Include any pap smears or colon screening.  No

## 2017-05-08 NOTE — MR AVS SNAPSHOT
Visit Information Date & Time Provider Department Dept. Phone Encounter #  
 5/8/2017  8:45 AM Linda Villa MD [place] [id number] [id number] [phone] Follow-up Instructions Return in about 8 weeks (around 7/3/2017) for Follow up. Upcoming Health Maintenance Date Due Pneumococcal 19-64 Medium Risk (1 of 1 - PPSV23) 5/2/1977 FOBT Q 1 YEAR AGE 50-75 5/2/2008 PAP AKA CERVICAL CYTOLOGY 7/18/2015 BREAST CANCER SCRN MAMMOGRAM 9/23/2016 INFLUENZA AGE 9 TO ADULT 8/1/2017 EYE EXAM RETINAL OR DILATED Q1 8/31/2017 HEMOGLOBIN A1C Q6M 9/3/2017 MICROALBUMIN Q1 9/8/2017 FOOT EXAM Q1 3/3/2018 LIPID PANEL Q1 3/3/2018 DTaP/Tdap/Td series (2 - Td) 1/1/2019 Allergies as of 5/8/2017  Review Complete On: 5/8/2017 By: Linda Villa MD  
  
 Severity Noted Reaction Type Reactions Ceclor [Cefaclor] High 07/06/2012    Rash Duane Jeronimo Sulfur High 06/21/2012    Anaphylaxis Azithromycin  06/21/2012    Other (comments) Stomach issues Azithromycin  07/06/2012    Nausea Only Pt states she is allergic to all mycin drugs. Ceclor [Cefaclor]  06/21/2012    Rash Ciprofloxacin  07/06/2012    Other (comments) Headache Ciprofloxacin (Bulk)  06/21/2012    Other (comments)  
 headache Lexapro [Escitalopram]  07/06/2012    Other (comments) Pt states she had arm pains and vision changes. Losartan  07/13/2015    Cough Sulfa (Sulfonamide Antibiotics)  07/06/2012    Shortness of Breath, Rash Tetracycline  06/21/2012    Other (comments) Stomach issues Tetracycline  07/06/2012    Nausea Only Zyrtec [Cetirizine]  07/14/2014   Systemic Other (comments) Pt states that it makes her heart race Current Immunizations  Reviewed on 3/3/2017 Name Date Influenza Vaccine 11/1/2016, 10/8/2014, 10/1/2013 TB Skin Test (PPD) Intradermal 9/25/2013 TDAP Vaccine 1/1/2009 Not reviewed this visit You Were Diagnosed With   
  
 Codes Comments Controlled type 2 diabetes mellitus without complication, without long-term current use of insulin ([facility].)    -  Primary ICD-10-CM: E11.9 ICD-9-CM: 250.00 Yeast infection     ICD-10-CM: B37.9 ICD-9-CM: 112.9 Hypercalcemia     ICD-10-CM: W62.94 
ICD-9-CM: 275.42 Vitals BP Pulse Temp Resp Height(growth percentile) Weight(growth percentile) 130/90 (BP 1 Location: Left arm, BP Patient Position: Sitting) 69 97 °F (36.1 °C) (Oral) 16 5' 3\" (1.6 m) 186 lb (84.4 kg) SpO2 BMI OB Status Smoking Status 94% 32.95 kg/m2 Postmenopausal Never Smoker BMI and BSA Data Body Mass Index Body Surface Area 32.95 kg/m 2 1.94 m 2 Preferred Pharmacy Pharmacy Name Phone [facility] [phone] Your Updated Medication List  
  
   
This list is accurate as of: 5/8/17  9:44 AM.  Always use your most recent med list.  
  
  
  
  
 Blood-Glucose Meter monitoring kit Use as directed. Dx: E11.9 Calcium-Cholecalciferol (D3) 600 mg(1,500mg) -400 unit Cap Take  by mouth. FISH OIL 1,000 mg Cap Generic drug:  omega-3 fatty acids-vitamin e Take 2 Caps by mouth. fluconazole 150 mg tablet Commonly known as:  DIFLUCAN Take 1 Tab by mouth daily for 1 day. FDA advises cautious prescribing of oral fluconazole in pregnancy. glucose blood VI test strips strip Commonly known as:  ASCENSIA AUTODISC VI, ONE TOUCH ULTRA TEST VI  
Use to check blood sugar once daily. * Lancets Misc Use as directed. Dx: E11.9  
  
 * ONETOUCH DELICA LANCETS 30 gauge Misc Generic drug:  lancets  
  
 magnesium 250 mg Tab Take  by mouth.  
  
 melatonin 3 mg tablet Take  by mouth.  
  
 metFORMIN 500 mg tablet Commonly known as:  GLUCOPHAGE Take 1 Tab by mouth two (2) times daily (with meals). mometasone 50 mcg/actuation nasal spray Commonly known as: NASONEX  
2 Sprays by Both Nostrils route daily. PROBIOTIC 4X 10-15 mg Tbec Generic drug:  B.infantis-B.ani-B.long-B.bifi Take  by mouth daily. turmeric root extract 500 mg Cap Take  by mouth. * Notice: This list has 2 medication(s) that are the same as other medications prescribed for you. Read the directions carefully, and ask your doctor or other care provider to review them with you. Prescriptions Printed Refills  
 metFORMIN (GLUCOPHAGE) 500 mg tablet 3 Sig: Take 1 Tab by mouth two (2) times daily (with meals). Class: Print Route: Oral  
  
Prescriptions Sent to Pharmacy Refills  
 fluconazole (DIFLUCAN) 150 mg tablet 0 Sig: Take 1 Tab by mouth daily for 1 day. FDA advises cautious prescribing of oral fluconazole in pregnancy. Class: Normal  
 Pharmacy: [place], [address]  #: [phone] Route: Oral  
  
Follow-up Instructions Return in about 8 weeks (around 7/3/2017) for Follow up. To-Do List   
 05/09/2017 9:00 AM  
  Appointment with [facility] 3 at [address] [place] [address] ([phone]) Shower or bathe using soap and water. Do not use deodorant, powder, perfumes, or lotion the day of your exam.  If your prior mammograms were not performed at [facility] 6 please bring films with you or forward prior images 2 days before your procedure. Check in at registration 15min before your appointment time unless you were instructed to do otherwise. A script is not necessary, but if you have one, please bring it on the day of the mammogram or have it faxed to the department. [facility] [phone] [facility]  [phone] [facility] [address]  [phone] [facility] [phone] [address] [phone] Patient Instructions HEALTHY SWEETS How much: Sparingly Healthy choices: Unsweetened dried fruit, dark chocolate, fruit sorbet Why: Dark chocolate provides polyphenols with antioxidant activity. Choose dark chocolate with at least 70 percent pure cocoa and have an ounce a few times a week. Fruit sorbet is a better option than other frozen desserts. RED WINE How much: Optional, no more than 1-2 glasses per day Healthy choices: Organic red wine Why: Red wine has beneficial antioxidant activity. Limit intake to no more than 1-2 servings per day. If you do not drink alcohol, do not start. SUPPLEMENTS How much: Daily Healthy choices: High quality multivitamin/multimineral that includes key antioxidants (vitamin C, vitamin E, mixed carotenoids, and selenium); co-enzyme Q10; 2-3 grams of a molecularly distilled fish oil; 2,000 IU of vitamin D3 Why: Supplements help fill any gaps in your diet when you are unable to get your daily requirement of micronutrients. Click here to learn more about supplements and get your free recommendation. TEA How much: 2-4 cups per day Healthy choices: White, green, oolong teas Why: Tea is rich in catechins, antioxidant compounds that reduce inflammation. Purchase high-quality tea and learn how to correctly brew it for maximum taste and health benefits. [facility] How much: Unlimited amounts Healthy choices: Turmeric, newberry powder (which contains turmeric), luke and garlic (dried and fresh), chili peppers, basil, cinnamon, rosemary, thyme Why: Use these herbs and spices generously to season foods. Turmeric and luke are powerful, natural anti-inflammatory agents. OTHER SOURCES OF PROTEIN How much: 1-2 servings a week (one portion is equal to 1 ounce of cheese, 1 eight-ounce serving of dairy, 1 egg, 3 ounces cooked poultry or skinless meat) Healthy choices: High quality natural cheese and yogurt, omega-3 enriched eggs, skinless poultry, grass-fed lean meats Why: In general, try to reduce consumption of animal foods. If you eat chicken, choose organic, cage-free chicken and remove the skin and associated fat.   Use organic, reduced-fat dairy products moderately, especially yogurt and natural cheeses such as Emmental (Swiss), [place] and true ROSS. If you eat eggs, choose omega-3 enriched eggs (made by feeding hens a flax-meal-enriched diet), or organic eggs from free-range chickens. [facility] How much: Unlimited amounts Healthy choices: Shiitake, enokidake, maitake, oyster mushrooms (and wild mushrooms if available) Why: These mushrooms contain compounds that enhance immune function. Never eat mushrooms raw, and minimize consumption of common commercial button mushrooms (including crimini and portobello). WHOLE SOY FOODS How much: 1-2 servings per day (one serving is equal to ½ cup tofu or tempeh, 1 cup soymilk, ½ cup cooked edamame, 1 ounce of soynuts) Healthy choices: Tofu, tempeh, edamame, soy nuts, soymilk Why: Soy foods contain isoflavones that have antioxidant activity and are protective against cancer. Choose whole soy foods over fractionated foods like isolated soy protein powders and imitation meats made with soy isolate. FISH & SEAFOOD How much:  2-6 servings per week (one serving is equal to 4 ounces of fish or seafood) Healthy choices: Wild [place] salmon (especially sockeye), herring, sardines, and black cod (sablefish) Why: These fish are rich in omega-3 fats, which are strongly anti-inflammatory. If you choose not to eat fish, take a molecularly distilled fish oil supplement that provides both EPA and DHA in a dose of 2-3 grams per day. HEALTHY FATS How much:  5-7 servings per day (one serving is equal to 1 teaspoon of oil, 2 walnuts, 1 tablespoon of flaxseed, 1 ounce of avocado) Healthy choices: For cooking, use extra virgin olive oil and expeller-pressed organic canola oil. Other sources of healthy fats include nuts (especially walnuts), avocados, and seeds - including hemp seeds and freshly ground flaxseed.  Omega-3 fats are also found in cold water fish, omega-3 enriched eggs, and whole soy foods. Organic, expeller pressed, high-oleic sunflower or safflower oils may also be used, as well as walnut and hazelnut oils in salads and dark roasted sesame oil as a flavoring for soups and stir-fries Why: Healthy fats are those rich in either monounsaturated or omega-3 fats. Extra-virgin olive oil is rich in polyphenols with antioxidant activity and canola oil contains a small fraction of omega-3 fatty acids. WHOLE & CRACKED GRAINS How much:  3-5 servings a day (one serving is equal to about ½ cup cooked grains) Healthy choices: Brown rice, basmati rice, wild rice, buckwheat, groats, barley, quinoa, steel-cut oats Why: Whole grains digest slowly, reducing frequency of spikes in blood sugar that promote inflammation. \"Whole grains\" means grains that are intact or in a few large pieces, not whole wheat bread or other products made from flour. PASTA (al dente) How much: 2-3 servings per week (one serving is equal to about ½ cup cooked pasta) Healthy choices: Organic pasta, rice noodles, bean thread noodles, and part whole wheat and buckwheat noodles like [place] udon and soba Why: Pasta cooked al dente (when it has \"tooth\" to it) has a lower glycemic index than fully-cooked pasta. Low-glycemic-load carbohydrates should be the bulk of your carbohydrate intake to help minimize spikes in blood glucose levels. BEANS & LEGUMES How much: 1-2 servings per day (one serving is equal to ½ cup cooked beans or legumes) Healthy choices: Beans like [place]as[place], adzuki and black, as well as chickpeas, black-eyed peas and lentils Why: Beans are rich in folic acid, magnesium, potassium and soluble fiber. They are a low-glycemic-load food. Eat them well-cooked either whole or pureed into spreads like hummus. VEGETABLES How much: 4-5 servings per day minimum (one serving is equal to 2 cups salad greens, ½ cup vegetables cooked, raw or juiced) Healthy Choices: Lightly cooked dark leafy greens (spinach, kai greens, kale, Swiss chard), cruciferous vegetables (broccoli, cabbage, [place] sprouts, kale, bok greg and cauliflower), carrots, beets, onions, peas, squashes, sea vegetables and washed raw salad greens Why: Vegetables are rich in flavonoids and carotenoids with both antioxidant and anti-inflammatory activity. Go for a wide range of colors, eat them both raw and cooked, and choose organic when possible. FRUITS How much:  3-4 servings per day (one serving is equal to 1 medium size piece of fruit, ½ cup chopped fruit, ¼ cup of dried fruit) Healthy choices: Raspberries, blueberries, strawberries, peaches, nectarines, oranges, pink grapefruit, red grapes, plums, pomegranates, blackberries, cherries, apples, and pears - all lower in glycemic load than most tropical fruits Why: Fruits are rich in flavonoids and carotenoids with both antioxidant and anti-inflammatory activity. Go for a wide range of colors, choose fruit that is fresh in season or frozen, and buy organic when possible. Additional Item: 
WATER How much: Throughout the day Healthy choices: Drink pure water, or drinks that are mostly water (tea, very diluted fruit juice, sparkling water with lemon) throughout the day. Why: Water is vital for overall functioning of the body. Introducing \[facility]\"" & HEALTH SERVICES! Dear Hari Weber: Thank you for requesting a [employer] account. Our records indicate that you already have an active [employer] account. You can access your account anytime at [email or web address] Did you know that you can access your hospital and ER discharge instructions at any time in [employer]? You can also review all of your test results from your hospital stay or ER visit. Additional Information If you have questions, please visit the Frequently Asked Questions section of the [employer] website at [email or web address] Remember, [employer] is NOT to be used for urgent needs.  For medical emergencies, dial 911. Now available from your iPhone and Android! Please provide this summary of care documentation to your next provider. Your primary care clinician is listed as Sherren Lips. If you have any questions after today's visit, please call [phone].

## 2017-05-08 NOTE — PATIENT INSTRUCTIONS
HEALTHY SWEETS  How much: Sparingly  Healthy choices: Unsweetened dried fruit, dark chocolate, fruit sorbet  Why: Dark chocolate provides polyphenols with antioxidant activity. Choose dark chocolate with at least 70 percent pure cocoa and have an ounce a few times a week. Fruit sorbet is a better option than other frozen desserts. RED WINE  How much: Optional, no more than 1-2 glasses per day  Healthy choices: Organic red wine   Why: Red wine has beneficial antioxidant activity. Limit intake to no more than 1-2 servings per day. If you do not drink alcohol, do not start. SUPPLEMENTS  How much: Daily   Healthy choices: High quality multivitamin/multimineral that includes key antioxidants (vitamin C, vitamin E, mixed carotenoids, and selenium); co-enzyme Q10; 2-3 grams of a molecularly distilled fish oil; 2,000 IU of vitamin D3   Why: Supplements help fill any gaps in your diet when you are unable to get your daily requirement of micronutrients. Click here to learn more about supplements and get your free recommendation. TEA  How much: 2-4 cups per day  Healthy choices: White, green, oolong teas  Why: Tea is rich in catechins, antioxidant compounds that reduce inflammation. Purchase high-quality tea and learn how to correctly brew it for maximum taste and health benefits. HEALTHY HERBS & SPICES  How much: Unlimited amounts  Healthy choices: Turmeric, newberry powder (which contains turmeric), luke and garlic (dried and fresh), chili peppers, basil, cinnamon, rosemary, thyme  Why: Use these herbs and spices generously to season foods. Turmeric and luke are powerful, natural anti-inflammatory agents.   OTHER SOURCES OF PROTEIN  How much: 1-2 servings a week (one portion is equal to 1 ounce of cheese, 1 eight-ounce serving of dairy, 1 egg, 3 ounces cooked poultry or skinless meat)  Healthy choices: High quality natural cheese and yogurt, omega-3 enriched eggs, skinless poultry, grass-fed lean meats  Why: In general, try to reduce consumption of animal foods. If you eat chicken, choose organic, cage-free chicken and remove the skin and associated fat. Use organic, reduced-fat dairy products moderately, especially yogurt and natural cheeses such as Emmental (Swiss), [place] and true ROSS. If you eat eggs, choose omega-3 enriched eggs (made by feeding hens a flax-meal-enriched diet), or organic eggs from free-range chickens. COOKED  MUSHROOMS  How much: Unlimited amounts  Healthy choices: Shiitake, enokidake, maitake, oyster mushrooms (and wild mushrooms if available)   Why: These mushrooms contain compounds that enhance immune function. Never eat mushrooms raw, and minimize consumption of common commercial button mushrooms (including crimini and portobello). WHOLE SOY FOODS  How much: 1-2 servings per day (one serving is equal to ½ cup tofu or tempeh, 1 cup soymilk, ½ cup cooked edamame, 1 ounce of soynuts)  Healthy choices: Tofu, tempeh, edamame, soy nuts, soymilk  Why: Soy foods contain isoflavones that have antioxidant activity and are protective against cancer. Choose whole soy foods over fractionated foods like isolated soy protein powders and imitation meats made with soy isolate. FISH & SEAFOOD  How much:  2-6 servings per week (one serving is equal to 4 ounces of fish or seafood)  Healthy choices: Wild [place] salmon (especially sockeye), herring, sardines, and black cod (sablefish)  Why: These fish are rich in omega-3 fats, which are strongly anti-inflammatory. If you choose not to eat fish, take a molecularly distilled fish oil supplement that provides both EPA and DHA in a dose of 2-3 grams per day. HEALTHY FATS  How much:  5-7 servings per day (one serving is equal to 1 teaspoon of oil, 2 walnuts, 1 tablespoon of flaxseed, 1 ounce of avocado)   Healthy choices: For cooking, use extra virgin olive oil and expeller-pressed organic canola oil.  Other sources of healthy fats include nuts (especially walnuts), avocados, and seeds - including hemp seeds and freshly ground flaxseed. Omega-3 fats are also found in cold water fish, omega-3 enriched eggs, and whole soy foods. Organic, expeller pressed, high-oleic sunflower or safflower oils may also be used, as well as walnut and hazelnut oils in salads and dark roasted sesame oil as a flavoring for soups and stir-fries  Why: Healthy fats are those rich in either monounsaturated or omega-3 fats. Extra-virgin olive oil is rich in polyphenols with antioxidant activity and canola oil contains a small fraction of omega-3 fatty acids. WHOLE & CRACKED GRAINS  How much:  3-5 servings a day (one serving is equal to about ½ cup cooked grains)  Healthy choices: [place], basmati rice, wild rice, buckwheat, groats, barley, quinoa, steel-cut oats   Why: Whole grains digest slowly, reducing frequency of spikes in blood sugar that promote inflammation. \"Whole grains\" means grains that are intact or in a few large pieces, not whole wheat bread or other products made from flour. PASTA (al dente)  How much: 2-3 servings per week (one serving is equal to about ½ cup cooked pasta)  Healthy choices: Organic pasta, rice noodles, bean thread noodles, and part whole wheat and buckwheat noodles like [place] udon and soba  Why: Pasta cooked al dente (when it has \"tooth\" to it) has a lower glycemic index than fully-cooked pasta. Low-glycemic-load carbohydrates should be the bulk of your carbohydrate intake to help minimize spikes in blood glucose levels. BEANS & LEGUMES  How much: 1-2 servings per day (one serving is equal to ½ cup cooked beans or legumes)  Healthy choices: Beans like [place], adzuki and black, as well as chickpeas, black-eyed peas and lentils  Why: Beans are rich in folic acid, magnesium, potassium and soluble fiber. They are a low-glycemic-load food. Eat them well-cooked either whole or pureed into spreads like hummus.   VEGETABLES  How much: 4-5 servings per day minimum (one serving is equal to 2 cups salad greens, ½ cup vegetables cooked, raw or juiced)  Healthy Choices: Lightly cooked dark leafy greens (spinach, kai greens, kale, Swiss chard), cruciferous vegetables (broccoli, cabbage, [place] sprouts, kale, bok greg and cauliflower), carrots, beets, onions, peas, squashes, sea vegetables and washed raw salad greens  Why: Vegetables are rich in flavonoids and carotenoids with both antioxidant and anti-inflammatory activity. Go for a wide range of colors, eat them both raw and cooked, and choose organic when possible. FRUITS  How much:  3-4 servings per day (one serving is equal to 1 medium size piece of fruit, ½ cup chopped fruit, ¼ cup of dried fruit)  Healthy choices: Raspberries, blueberries, strawberries, peaches, nectarines, oranges, pink grapefruit, red grapes, plums, pomegranates, blackberries, cherries, apples, and pears - all lower in glycemic load than most tropical fruits  Why: Fruits are rich in flavonoids and carotenoids with both antioxidant and anti-inflammatory activity. Go for a wide range of colors, choose fruit that is fresh in season or frozen, and buy organic when possible. Additional Item:  WATER  How much: Throughout the day  Healthy choices: Drink pure water, or drinks that are mostly water (tea, very diluted fruit juice, sparkling water with lemon) throughout the day. Why: Water is vital for overall functioning of the body.

## 2017-05-09 ENCOUNTER — HOSPITAL ENCOUNTER (OUTPATIENT)
Dept: MAMMOGRAPHY | Age: 57
Discharge: HOME | End: 2017-05-09
Attending: INTERNAL MEDICINE
Payer: COMMERCIAL

## 2017-05-09 DIAGNOSIS — Z12.39 BREAST CANCER SCREENING: ICD-10-CM

## 2017-05-09 PROCEDURE — 77067 SCR MAMMO BI INCL CAD: CPT

## 2017-07-13 DIAGNOSIS — E11.9 CONTROLLED TYPE 2 DIABETES MELLITUS WITHOUT COMPLICATION, WITHOUT LONG-TERM CURRENT USE OF INSULIN (HCC): ICD-10-CM

## 2017-07-13 DIAGNOSIS — R74.8 ELEVATED LIVER ENZYMES: ICD-10-CM

## 2017-07-13 DIAGNOSIS — E55.9 VITAMIN D DEFICIENCY: ICD-10-CM

## 2017-07-13 DIAGNOSIS — E83.52 HYPERCALCEMIA: ICD-10-CM

## 2017-07-15 LAB
25(OH)D3+25(OH)D2 SERPL-MCNC: 50.8 NG/ML (ref 30–100)
ALBUMIN SERPL-MCNC: 4.4 G/DL (ref 3.5–5.5)
ALBUMIN/CREAT UR: 4.7 MG/G CREAT (ref 0–30)
ALBUMIN/GLOB SERPL: 1.6 {RATIO} (ref 1.2–2.2)
ALP SERPL-CCNC: 84 IU/L (ref 39–117)
ALT SERPL-CCNC: 70 IU/L (ref 0–32)
ANA SER QL: NEGATIVE
APPEARANCE UR: ABNORMAL
AST SERPL-CCNC: 64 IU/L (ref 0–40)
BILIRUB SERPL-MCNC: 0.2 MG/DL (ref 0–1.2)
BILIRUB UR QL STRIP: NEGATIVE
BUN SERPL-MCNC: 13 MG/DL (ref 6–24)
BUN/CREAT SERPL: 20 (ref 9–23)
CALCIUM SERPL-MCNC: 10 MG/DL (ref 8.7–10.2)
CHLORIDE SERPL-SCNC: 99 MMOL/L (ref 96–106)
CHOLEST SERPL-MCNC: 200 MG/DL (ref 100–199)
CO2 SERPL-SCNC: 25 MMOL/L (ref 18–29)
COLOR UR: YELLOW
CREAT SERPL-MCNC: 0.64 MG/DL (ref 0.57–1)
CREAT UR-MCNC: 160.9 MG/DL
GGT SERPL-CCNC: 78 IU/L (ref 0–60)
GLOBULIN SER CALC-MCNC: 2.8 G/DL (ref 1.5–4.5)
GLUCOSE SERPL-MCNC: 133 MG/DL (ref 65–99)
GLUCOSE UR QL: NEGATIVE
HDLC SERPL-MCNC: 50 MG/DL
HGB UR QL STRIP: NEGATIVE
INTERPRETATION, 910389: NORMAL
KETONES UR QL STRIP: NEGATIVE
LDLC SERPL CALC-MCNC: 133 MG/DL (ref 0–99)
LEUKOCYTE ESTERASE UR QL STRIP: NEGATIVE
Lab: NORMAL
MICRO URNS: ABNORMAL
MICROALBUMIN UR-MCNC: 7.5 UG/ML
NITRITE UR QL STRIP: NEGATIVE
PH UR STRIP: 7 [PH] (ref 5–7.5)
POTASSIUM SERPL-SCNC: 4.5 MMOL/L (ref 3.5–5.2)
PROT SERPL-MCNC: 7.2 G/DL (ref 6–8.5)
PROT UR QL STRIP: NEGATIVE
PTH-INTACT SERPL-MCNC: 23 PG/ML (ref 15–65)
SEE BELOW:, 164879: NORMAL
SODIUM SERPL-SCNC: 141 MMOL/L (ref 134–144)
SP GR UR: 1.02 (ref 1–1.03)
TRIGL SERPL-MCNC: 85 MG/DL (ref 0–149)
UROBILINOGEN UR STRIP-MCNC: 0.2 MG/DL (ref 0.2–1)
VLDLC SERPL CALC-MCNC: 17 MG/DL (ref 5–40)

## 2017-07-21 ENCOUNTER — OFFICE VISIT (OUTPATIENT)
Dept: INTERNAL MEDICINE CLINIC | Age: 57
End: 2017-07-21

## 2017-07-21 VITALS
BODY MASS INDEX: 33.31 KG/M2 | HEIGHT: 63 IN | HEART RATE: 80 BPM | SYSTOLIC BLOOD PRESSURE: 120 MMHG | DIASTOLIC BLOOD PRESSURE: 86 MMHG | RESPIRATION RATE: 18 BRPM | TEMPERATURE: 97.8 F | OXYGEN SATURATION: 98 % | WEIGHT: 188 LBS

## 2017-07-21 DIAGNOSIS — R79.89 ELEVATED LFTS: ICD-10-CM

## 2017-07-21 DIAGNOSIS — E83.52 HYPERCALCEMIA: ICD-10-CM

## 2017-07-21 DIAGNOSIS — I10 BENIGN ESSENTIAL HYPERTENSION: ICD-10-CM

## 2017-07-21 DIAGNOSIS — E78.5 HYPERLIPIDEMIA WITH TARGET LDL LESS THAN 100: ICD-10-CM

## 2017-07-21 DIAGNOSIS — R79.89 ELEVATED LIVER FUNCTION TESTS: Primary | ICD-10-CM

## 2017-07-21 DIAGNOSIS — R74.8 ELEVATED LIVER ENZYMES: ICD-10-CM

## 2017-07-21 DIAGNOSIS — E11.9 CONTROLLED TYPE 2 DIABETES MELLITUS WITHOUT COMPLICATION, WITHOUT LONG-TERM CURRENT USE OF INSULIN (HCC): ICD-10-CM

## 2017-07-21 RX ORDER — METFORMIN HYDROCHLORIDE 500 MG/1
500 TABLET, EXTENDED RELEASE ORAL 2 TIMES DAILY
Qty: 180 TAB | Refills: 12 | Status: SHIPPED | OUTPATIENT
Start: 2017-07-21 | End: 2018-08-22 | Stop reason: SDUPTHER

## 2017-07-21 NOTE — LETTER
7/21/2017 10:10 AM 
 
Ms. Elva Lacey [address] [facility] [id number] Dear Elva Lacey: 
 
Please find your most recent results below. Resulted Orders PTH INTACT Result Value Ref Range PTH, Intact 23 15 - 65 pg/mL Narrative Performed at:  [address]  [id number] : Neida Billings MD, Phone:  [phone] MICROALBUMIN, UR, RAND W/ MICROALBUMIN/CREA RATIO Result Value Ref Range Creatinine, urine 160.9 Not Estab. mg/dL Microalbumin, urine 7.5 Not Estab. ug/mL Microalb/Creat ratio (ug/mg creat.) 4.7 0.0 - 30.0 mg/g creat Narrative Performed at:  [address]  [id number] : Neida Billings MD, Phone:  [phone] VITAMIN D, 25 HYDROXY Result Value Ref Range VITAMIN D, 25-HYDROXY 50.8 30.0 - 100.0 ng/mL Comment:  
   Vitamin D deficiency has been defined by the [address] [place] [address] practice guideline as a 
level of serum 25-OH vitamin D less than 20 ng/mL (1,2). The Endocrine Society went on to further define vitamin D 
insufficiency as a level between 21 and 29 ng/mL (2). 1. IOM ([place] of [employer]). 2010. Dietary reference 
   intakes for calcium and D. [facility]: The 
   [employer]. 2. Abilio MF, Cruz NC, Chuck PRESCOTT, et al. 
   Evaluation, treatment, and prevention of vitamin D 
   deficiency: an Endocrine Society clinical practice 
   guideline. JCEM. 2011 Jul; 96(7):1911-30. Narrative Performed at:  [address]  [id number] : Neida Billings MD, Phone:  [phone] URINALYSIS W/ RFLX MICROSCOPIC Result Value Ref Range Specific Gravity 1.018 1.005 - 1.030  
 pH (UA) 7.0 5.0 - 7.5 Color Yellow Yellow Appearance Cloudy (A) Clear Leukocyte Esterase Negative Negative  Protein Negative Negative/Trace Glucose Negative Negative Ketone Negative Negative Blood Negative Negative Bilirubin Negative Negative Urobilinogen 0.2 0.2 - 1.0 mg/dL Nitrites Negative Negative Microscopic Examination Comment Comment:  
   Microscopic not indicated and not performed. Narrative Performed at:  [address]  [id number] : Tejal Rosenberg MD, Phone:  [phone] METABOLIC PANEL, COMPREHENSIVE Result Value Ref Range Glucose 133 (H) 65 - 99 mg/dL BUN 13 6 - 24 mg/dL Creatinine 0.64 0.57 - 1.00 mg/dL GFR est non-AA 98 >59 mL/min/1.73 GFR est  >59 mL/min/1.73  
 BUN/Creatinine ratio 20 9 - 23 Sodium 141 134 - 144 mmol/L Potassium 4.5 3.5 - 5.2 mmol/L Chloride 99 96 - 106 mmol/L  
 CO2 25 18 - 29 mmol/L Calcium 10.0 8.7 - 10.2 mg/dL Protein, total 7.2 6.0 - 8.5 g/dL Albumin 4.4 3.5 - 5.5 g/dL GLOBULIN, TOTAL 2.8 1.5 - 4.5 g/dL A-G Ratio 1.6 1.2 - 2.2 Bilirubin, total 0.2 0.0 - 1.2 mg/dL Alk. phosphatase 84 39 - 117 IU/L  
 AST (SGOT) 64 (H) 0 - 40 IU/L  
 ALT (SGPT) 70 (H) 0 - 32 IU/L Narrative Performed at:  [address]  [id number] : Tejal Rosenberg MD, Phone:  [phone] LIPID PANEL Result Value Ref Range Cholesterol, total 200 (H) 100 - 199 mg/dL Triglyceride 85 0 - 149 mg/dL HDL Cholesterol 50 >39 mg/dL VLDL, calculated 17 5 - 40 mg/dL LDL, calculated 133 (H) 0 - 99 mg/dL Narrative Performed at:  [address]  [id number] : Tejal Rosenberg MD, Phone:  [phone] GGT Result Value Ref Range GGT 78 (H) 0 - 60 IU/L Narrative Performed at:  [address]  [id number] : Tejal Rosenberg MD, Phone:  [phone] SAHARA QL, W/REFLEX CASCADE Result Value Ref Range  SAHARA Direct Negative Negative See below Comment Comment:  
   Autoantibody                       Disease Association 
____________________________________________________________ Condition                  Frequency 
_____________________   ________________________   _________ Antinuclear Antibody,    SLE, mixed connective Direct (SAHARA-D)           tissue diseases 
_____________________   ________________________   _________ 
dsDNA                    SLE                        40 - 60% 
_____________________   ________________________   _________ Chromatin                Drug induced SLE                90% SLE                        48 - 97% 
_____________________   ________________________   _________ 
Anni Mac (Ro)                 SLE                        25 - 35% Sjogren's Syndrome         40 - 70%  Lupus                 100% 
_____________________   ________________________   _________ 
SSB (La)                 SLE 10% Sjogren's Syndrome              30% 
_____________________   _______________________    _________ Sm (anti-Smith)          SLE                        15 - 30% 
_____________________   _______________________    _________ 
RNP                      Mixed Connective Tissue Disease                         95% 
(U1 nRNP,                SLE                        30 - 50% 
anti-ribonucleoprotein)  Polymyositis and/or Dermatomyositis                 20% 
_____________________   ________________________   _________ Scl-70 (antiDNA          Scleroderma (diffuse)      20 - 35% 
topoisomerase)           Crest                           13% 
_____________________   ________________________   _________ Elizabet-1                     Polymyositis and/or                          Dermatomyositis            20 - 40% 
_____________________   ________________________   _________ Centromere B             Scleroderma - 
 Crest 
                         variant                         80% 
_____________________   ________________________   _________ Ribosomal P              SLE                        10 - 20% Narrative Performed at:  [address]  [id number] : Jennifer Goins MD, Phone:  [phone] CVD REPORT Result Value Ref Range INTERPRETATION Note Comment:  
   Supplement report is available. Narrative Performed at:  [address] [place] [address]  [id number] : Rima Graves PhD, Phone:  [phone] DIABETES PATIENT EDUCATION Result Value Ref Range PDF Image Not applicable Narrative Performed at:  [address] [place] [address]  [id number] : Rima Graves PhD, Phone:  [phone] RECOMMENDATIONS: 
 
 
Please call me if you have any questions: [phone] Sincerely, Rohan Astudillo MD

## 2017-07-21 NOTE — MR AVS SNAPSHOT
Visit Information Date & Time Provider Department Dept. Phone Encounter #  
 7/21/2017  9:45 AM Tacho Mcclelland MD [place] [address] [facility] [phone] [id number] Follow-up Instructions Return in about 4 months (around 11/21/2017), or if symptoms worsen or fail to improve, for 30 min slot, CPE. Upcoming Health Maintenance Date Due Pneumococcal 19-64 Medium Risk (1 of 1 - PPSV23) 5/2/1977 FOBT Q 1 YEAR AGE 50-75 5/2/2008 PAP AKA CERVICAL CYTOLOGY 7/18/2015 INFLUENZA AGE 9 TO ADULT 8/1/2017 EYE EXAM RETINAL OR DILATED Q1 8/31/2017 HEMOGLOBIN A1C Q6M 9/3/2017 FOOT EXAM Q1 3/3/2018 MICROALBUMIN Q1 7/13/2018 LIPID PANEL Q1 7/13/2018 DTaP/Tdap/Td series (2 - Td) 1/1/2019 BREAST CANCER SCRN MAMMOGRAM 5/9/2019 Allergies as of 7/21/2017  Review Complete On: 7/21/2017 By: Tacho Mcclelland MD  
  
 Severity Noted Reaction Type Reactions Ceclor [Cefaclor] High 07/06/2012    Rash Otis Million Sulfur High 06/21/2012    Anaphylaxis Invokana [Canagliflozin] Medium 07/21/2017   Topical Other (comments) Alopecia and yeast   
 Azithromycin  06/21/2012    Other (comments) Stomach issues Azithromycin  07/06/2012    Nausea Only Pt states she is allergic to all mycin drugs. Ceclor [Cefaclor]  06/21/2012    Rash Ciprofloxacin  07/06/2012    Other (comments) Headache Ciprofloxacin (Bulk)  06/21/2012    Other (comments)  
 headache Lexapro [Escitalopram]  07/06/2012    Other (comments) Pt states she had arm pains and vision changes. Losartan  07/13/2015    Cough Sulfa (Sulfonamide Antibiotics)  07/06/2012    Shortness of Breath, Rash Tetracycline  06/21/2012    Other (comments) Stomach issues Tetracycline  07/06/2012    Nausea Only Zyrtec [Cetirizine]  07/14/2014   Systemic Other (comments) Pt states that it makes her heart race Current Immunizations  Reviewed on 3/3/2017 Name Date Influenza Vaccine 11/1/2016, 10/8/2014, 10/1/2013 TB Skin Test (PPD) Intradermal 9/25/2013 TDAP Vaccine 1/1/2009 Not reviewed this visit You Were Diagnosed With   
  
 Codes Comments Elevated liver function tests    -  Primary ICD-10-CM: R94.5 ICD-9-CM: 790.6 Controlled type 2 diabetes mellitus without complication, without long-term current use of insulin ([facility]     ICD-10-CM: E11.9 ICD-9-CM: 250.00 Vitals BP Pulse Temp Resp Height(growth percentile) Weight(growth percentile) 120/86 (BP 1 Location: Left arm, BP Patient Position: Sitting) 80 97.8 °F (36.6 °C) (Oral) 18 5' 3\" (1.6 m) 188 lb (85.3 kg) SpO2 BMI OB Status Smoking Status 98% 33.3 kg/m2 Postmenopausal Never Smoker Vitals History BMI and BSA Data Body Mass Index Body Surface Area  
 33.3 kg/m 2 1.95 m 2 Preferred Pharmacy Pharmacy Name Phone [facility] [address], [address] [place] [address] [phone] Your Updated Medication List  
  
   
This list is accurate as of: 7/21/17 10:38 AM.  Always use your most recent med list.  
  
  
  
  
 Blood-Glucose Meter monitoring kit Use as directed. Dx: E11.9 Calcium-Cholecalciferol (D3) 600 mg(1,500mg) -400 unit Cap Take  by mouth. FISH OIL 1,000 mg Cap Generic drug:  omega-3 fatty acids-vitamin e Take 2 Caps by mouth. glucose blood VI test strips strip Commonly known as:  ASCENSIA AUTODISC VI, ONE TOUCH ULTRA TEST VI  
Use to check blood sugar once daily. * Lancets Misc Use as directed. Dx: E11.9  
  
 * ONETOUCH DELICA LANCETS 30 gauge Misc Generic drug:  lancets  
  
 magnesium 250 mg Tab Take  by mouth.  
  
 melatonin 3 mg tablet Take  by mouth.  
  
 metFORMIN  mg tablet Commonly known as:  GLUCOPHAGE XR Take 1 Tab by mouth two (2) times a day. mometasone 50 mcg/actuation nasal spray Commonly known as:  NASONEX  
2 Sprays by Both Nostrils route daily. PROBIOTIC 4X 10-15 mg Tbec Generic drug:  B.infantis-B.ani-B.long-B.bifi Take  by mouth daily. turmeric root extract 500 mg Cap Take  by mouth. * Notice: This list has 2 medication(s) that are the same as other medications prescribed for you. Read the directions carefully, and ask your doctor or other care provider to review them with you. Prescriptions Sent to Pharmacy Refills  
 metFORMIN ER (GLUCOPHAGE XR) 500 mg tablet 12 Sig: Take 1 Tab by mouth two (2) times a day. Class: Normal  
 Pharmacy: [facility] [address] [employer]  #: [phone] Route: Oral  
  
We Performed the Following AMB POC HEMOGLOBIN A1C [87378 CPT(R)] Follow-up Instructions Return in about 4 months (around 11/21/2017), or if symptoms worsen or fail to improve, for 30 min slot, CPE. To-Do List   
 07/21/2017 Imaging:  US ABD LTD Referral Information Referral ID Referred By Referred To  
  
 [id number] Victoria Myers Not Available Visits Status Start Date End Date 1 New Request 7/21/17 7/21/18 If your referral has a status of pending review or denied, additional information will be sent to support the outcome of this decision. Patient Instructions Chromium picolinate 400 mg once a day Fingerstick today Hga1c (discussed its value with patient):  7.1 Introducing [facility]! Dear Pedro Leonard: Thank you for requesting a [employer] account. Our records indicate that you already have an active [employer] account. You can access your account anytime at [email or web address] Did you know that you can access your hospital and ER discharge instructions at any time in [employer]? You can also review all of your test results from your hospital stay or ER visit. Additional Information If you have questions, please visit the Frequently Asked Questions section of the [employer] website at [email or web address] Remember, [employer] is NOT to be used for urgent needs. For medical emergencies, dial 911. Now available from your iPhone and Android! Please provide this summary of care documentation to your next provider. Your primary care clinician is listed as Rohan Astudillo. If you have any questions after today's visit, please call [phone].

## 2017-07-21 NOTE — PROGRESS NOTES
Chief Complaint   Patient presents with    Diabetes     1. Have you been to the ER, urgent care clinic since your last visit? No  Hospitalized since your last visit? No    2. Have you seen or consulted any other health care providers outside of the [address] [place] [address] since your last visit? Yes, Dr Porter Shipman any pap smears or colon screening.  No

## 2017-07-21 NOTE — PATIENT INSTRUCTIONS
Chromium picolinate 400 mg once a day    Fingerstick today Hga1c (discussed its value with patient):  7.1

## 2017-07-21 NOTE — PROGRESS NOTES
Diabetes       HPI:  Casper Muñoz is a 59y.o. year old female who is here for a follow up visit. She was last seen by me on 5/8/2017. She reports the following:    Doesn't have time to exercise. She is a NP at [employer]  Review labs. Off hctz. Gets cough with losartan    Calcium and PTH is normal.    LDL is high still- refuses statin. Aware she needs chol treatment with blood sugar and DM. Increase LFT's    Friend tolerates the ER better. Wants to switch to day. Pt complain of her insurance and deductible. Cost is very high to see me. Assessment and Plan        1. Elevated liver function tests  Check ultrasound of liver with hx of increase LFT. Has had hepatitis work up negative in past as per pt. SAHARA negative. - US ABD LTD; Future    2. Controlled type 2 diabetes mellitus without complication, without long-term current use of insulin (HCC)  A1c improved. Metformin changed to ER.  - AMB POC HEMOGLOBIN A1C    3. Hyperlipidemia with target LDL less than 100  Pt refuses statin. Consider zetia if it remains up. Aware risk of heart disease and stroke high not on statin and being a diabetic. 4. Benign essential hypertension  BP stable off hctz. No proteinuria. 5. Elevated LFTs  Will repeat and get ultrasound. 6. Hypercalcemia  Appears transient and resolved.           Lab Results   Component Value Date/Time    Hemoglobin A1c 7.2 03/03/2017 10:08 AM    Hemoglobin A1c (POC) 6.6 02/05/2016 12:45 PM           Visit Vitals    /86 (BP 1 Location: Left arm, BP Patient Position: Sitting)    Pulse 80    Temp 97.8 °F (36.6 °C) (Oral)    Resp 18    Ht 5' 3\" (1.6 m)    Wt 188 lb (85.3 kg)    SpO2 98%    BMI 33.3 kg/m2       Historical Data    Past Medical History:   Diagnosis Date    Anemia     Diabetes mellitus type 2, controlled ([address] 8/5/2015    H/O bone density study 10/12    normal    H/O seasonal allergies     Hx of mammogram 9/20/12    Hypercalcemia 5/8/2017    Other and unspecified hyperlipidemia 2015    Pap smear for cervical cancer screening        Past Surgical History:   Procedure Laterality Date    HX  SECTION  2538,1675    HX CYST REMOVAL      HX CYST REMOVAL      right wrist    HX GI      colonoscopy    HX LIPECTOMY  2001    HX ORTHOPAEDIC  2016    back surgery    HX OTHER SURGICAL  1991    surgery to remove plantar wart    HX TONSILLECTOMY      HX TONSILLECTOMY  1967    MULTIPLE DELIVERY          Outpatient Encounter Prescriptions as of 2017   Medication Sig Dispense Refill    metFORMIN ER (GLUCOPHAGE XR) 500 mg tablet Take 1 Tab by mouth two (2) times a day. 180 Tab 12    B.infantis-B.ani-B.long-B.bifi (PROBIOTIC 4X) 10-15 mg TbEC Take  by mouth daily.  ONETOUCH DELICA LANCETS 30 gauge misc       glucose blood VI test strips (ASCENSIA AUTODISC VI, ONE TOUCH ULTRA TEST VI) strip Use to check blood sugar once daily. 100 Strip 1    Blood-Glucose Meter monitoring kit Use as directed. Dx: E11.9 1 Kit 0    Lancets misc Use as directed. Dx: E11.9 100 Each 1    mometasone (NASONEX) 50 mcg/actuation nasal spray 2 Sprays by Both Nostrils route daily. 1 Container 3    magnesium 250 mg Tab Take  by mouth.  omega-3 fatty acids-vitamin e (FISH OIL) 1,000 mg Cap Take 2 Caps by mouth.  turmeric root extract 500 mg Cap Take  by mouth.  melatonin 3 mg tablet Take  by mouth.  [DISCONTINUED] metFORMIN (GLUCOPHAGE) 500 mg tablet Take 1 Tab by mouth two (2) times daily (with meals). 180 Tab 3    Calcium-Cholecalciferol, D3, 600 mg(1,500mg) -400 unit cap Take  by mouth. No facility-administered encounter medications on file as of 2017.          Allergies   Allergen Reactions    Ceclor [Cefaclor] Rash     [employer]    Sulfur Anaphylaxis    Invokana [Canagliflozin] Other (comments)     Alopecia and yeast     Azithromycin Other (comments)     Stomach issues    Azithromycin Nausea Only     Pt states she is allergic to all mycin drugs.  Ceclor [Cefaclor] Rash    Ciprofloxacin Other (comments)     Headache    Ciprofloxacin (Bulk) Other (comments)     headache    Lexapro [Escitalopram] Other (comments)     Pt states she had arm pains and vision changes.  Losartan Cough    Sulfa (Sulfonamide Antibiotics) Shortness of Breath and Rash    Tetracycline Other (comments)     Stomach issues      Tetracycline Nausea Only    Zyrtec [Cetirizine] Other (comments)     Pt states that it makes her heart race        Social History     Social History    Marital status: SINGLE     Spouse name: Single    Number of children: 2    Years of education: BA     Occupational History    NP      [employer]     Social History Main Topics    Smoking status: Never Smoker    Smokeless tobacco: Never Used    Alcohol use No      Comment: occassionally--every other week    Drug use: No    Sexual activity: Not Currently     Other Topics Concern    Not on file     Social History Narrative    ** Merged History Encounter **             family history includes Cancer in her father and paternal aunt; Coronary Artery Disease in her brother; Hypertension in her brother, mother, and sister. Review of Systems   Constitutional: Negative for weight loss. Eyes: Negative for blurred vision. Respiratory: Negative for shortness of breath. Cardiovascular: Negative for chest pain. Gastrointestinal: Negative for abdominal pain. Genitourinary: Negative for dysuria and frequency. Skin: Negative for rash. Neurological: Negative for dizziness, focal weakness, weakness and headaches. Endo/Heme/Allergies: Negative for environmental allergies. Does not bruise/bleed easily. Physical Exam   Constitutional: She appears well-developed and well-nourished. She is active. Non-toxic appearance. She does not have a sickly appearance. She does not appear ill. No distress.    Eyes: Conjunctivae are normal. Right eye exhibits no discharge. Cardiovascular: Normal rate, regular rhythm, S1 normal, S2 normal, normal heart sounds and normal pulses. Exam reveals no gallop and no friction rub. Pulmonary/Chest: Effort normal and breath sounds normal. No respiratory distress. Abdominal: Soft. Bowel sounds are normal.   Musculoskeletal: She exhibits no edema or deformity. Neurological: She is alert. Skin: Skin is warm and dry. No rash noted. No pallor. Psychiatric: She has a normal mood and affect. Her behavior is normal.   Vitals reviewed. Ortho Exam      Orders Placed This Encounter    US ABD LTD     Standing Status:   Future     Standing Expiration Date:   8/21/2018     Order Specific Question:   Specific Body Part     Answer:   liver    AMB POC HEMOGLOBIN A1C    metFORMIN ER (GLUCOPHAGE XR) 500 mg tablet     Sig: Take 1 Tab by mouth two (2) times a day. Dispense:  180 Tab     Refill:  12        I have reviewed the patient's medical history in detail and updated the computerized patient record. We had a prolonged discussion about these complex clinical issues and went over the various important aspects to consider. All questions were answered. Advised her to call back or return to office if symptoms do not improve, change in nature, or persist.    She was given an after visit summary or informed of [employer] which includes patient instructions, diagnoses, current medications, & vitals. She expressed understanding with the diagnosis and plan.

## 2017-11-06 ENCOUNTER — HOSPITAL ENCOUNTER (OUTPATIENT)
Dept: PREADMISSION TESTING | Age: 57
Discharge: HOME | End: 2017-11-06
Payer: COMMERCIAL

## 2017-11-06 VITALS
DIASTOLIC BLOOD PRESSURE: 66 MMHG | WEIGHT: 185.25 LBS | RESPIRATION RATE: 18 BRPM | BODY MASS INDEX: 31.63 KG/M2 | SYSTOLIC BLOOD PRESSURE: 149 MMHG | HEIGHT: 64 IN | TEMPERATURE: 98.2 F | HEART RATE: 83 BPM | OXYGEN SATURATION: 96 %

## 2017-11-06 LAB
ALBUMIN SERPL-MCNC: 4 G/DL (ref 3.5–5)
ALBUMIN/GLOB SERPL: 1.1 {RATIO} (ref 1.1–2.2)
ALP SERPL-CCNC: 80 U/L (ref 45–117)
ALT SERPL-CCNC: 77 U/L (ref 12–78)
ANION GAP SERPL CALC-SCNC: 9 MMOL/L (ref 5–15)
AST SERPL-CCNC: 60 U/L (ref 15–37)
ATRIAL RATE: 76 BPM
BILIRUB SERPL-MCNC: 0.4 MG/DL (ref 0.2–1)
BUN SERPL-MCNC: 16 MG/DL (ref 6–20)
BUN/CREAT SERPL: 20 (ref 12–20)
CALCIUM SERPL-MCNC: 10 MG/DL (ref 8.5–10.1)
CALCULATED P AXIS, ECG09: 46 DEGREES
CALCULATED R AXIS, ECG10: 7 DEGREES
CALCULATED T AXIS, ECG11: 21 DEGREES
CHLORIDE SERPL-SCNC: 104 MMOL/L (ref 97–108)
CO2 SERPL-SCNC: 30 MMOL/L (ref 21–32)
CREAT SERPL-MCNC: 0.8 MG/DL (ref 0.55–1.02)
DIAGNOSIS, 93000: NORMAL
GLOBULIN SER CALC-MCNC: 3.5 G/DL (ref 2–4)
GLUCOSE SERPL-MCNC: 141 MG/DL (ref 65–100)
P-R INTERVAL, ECG05: 162 MS
POTASSIUM SERPL-SCNC: 4 MMOL/L (ref 3.5–5.1)
PROT SERPL-MCNC: 7.5 G/DL (ref 6.4–8.2)
Q-T INTERVAL, ECG07: 378 MS
QRS DURATION, ECG06: 76 MS
QTC CALCULATION (BEZET), ECG08: 425 MS
SODIUM SERPL-SCNC: 143 MMOL/L (ref 136–145)
VENTRICULAR RATE, ECG03: 76 BPM

## 2017-11-06 PROCEDURE — 80053 COMPREHEN METABOLIC PANEL: CPT | Performed by: ANESTHESIOLOGY

## 2017-11-06 PROCEDURE — 93005 ELECTROCARDIOGRAM TRACING: CPT

## 2017-11-06 PROCEDURE — 36415 COLL VENOUS BLD VENIPUNCTURE: CPT | Performed by: ANESTHESIOLOGY

## 2017-11-06 RX ORDER — TRIAMCINOLONE ACETONIDE 55 UG/1
2 SPRAY, METERED NASAL
COMMUNITY
End: 2019-07-19 | Stop reason: ALTCHOICE

## 2017-11-06 RX ORDER — ASPIRIN 81 MG/1
81 TABLET ORAL DAILY
COMMUNITY
End: 2017-11-21

## 2017-11-06 RX ORDER — LYSINE HCL 500 MG
1500 TABLET ORAL DAILY
COMMUNITY
End: 2017-11-21

## 2017-11-06 RX ORDER — ASCORBIC ACID 500 MG
1000 TABLET ORAL DAILY
COMMUNITY
End: 2018-10-03 | Stop reason: DRUGHIGH

## 2017-11-06 RX ORDER — LORATADINE 10 MG/1
10 TABLET ORAL DAILY
COMMUNITY

## 2017-11-06 RX ORDER — AA/PROT/LYSINE/METHIO/VIT C/B6 50-12.5 MG
100 TABLET ORAL DAILY
COMMUNITY

## 2017-11-06 NOTE — H&P
PAT Pre-Op History & Physical    Patient: Jessy Wilkes                  MRN: [id number]          SSN: [id number]  YOB: 1960          Age: 59 y.o. Sex: female                Subjective:   Patient is a 59 y.o.  female who presents with history of discomfort related to her large breast size. Had lower back surgery 2016 but still has back pain. Also c/o her bra straps hurting her shoulders. The patient was evaluated in the surgeon's office and it was determined that the most appropriate plan of care is to proceed with surgical intervention. Patient's PCP Kimi Connell MD            Past Medical History:   Diagnosis Date    Anemia     resolved with menopause    Arthritis     degenerative     Chronic pain     right hip    Diabetes mellitus type 2, controlled ([address] 2015    Elevated liver enzymes     H/O bone density study 10/12    normal    H/O seasonal allergies     Hx of mammogram 12    Hypercalcemia 2017    Hypertension     resolved last in May    Nausea & vomiting     Other and unspecified hyperlipidemia 2015    Pap smear for cervical cancer screening     Psychiatric disorder     anxiety no meds      Past Surgical History:   Procedure Laterality Date    HX ABDOMINOPLASTY      HX  SECTION  3412,8073    HX CYST REMOVAL      HX CYST REMOVAL      right wrist    HX GI      colonoscopy    HX HEENT Right     Lasik    HX LIPECTOMY  2001    HX ORTHOPAEDIC  2016    back surgery L3- L5    HX OTHER SURGICAL      surgery to remove plantar wart    HX TONSILLECTOMY      HX TONSILLECTOMY  1967    MULTIPLE DELIVERY         Prior to Admission medications    Medication Sig Start Date End Date Taking? Authorizing Provider   triamcinolone (NASACORT) 55 mcg nasal inhaler 2 Sprays nightly. Yes Historical Provider   loratadine (CLARITIN) 10 mg tablet Take 10 mg by mouth daily.    Yes Historical Provider   coenzyme q10 (CO Q-10) 10 mg cap Take 100 mg by mouth daily. Yes Historical Provider   ascorbic acid, vitamin C, (VITAMIN C) 500 mg tablet Take 1,000 mg by mouth daily. Yes Historical Provider   aspirin delayed-release 81 mg tablet Take 81 mg by mouth daily. Yes Historical Provider   garlic 5,595 mg cap Take 1,500 mg by mouth daily. Yes Historical Provider   zinc 50 mg tab tablet Take 50 mg by mouth daily. Yes Historical Provider   potassium 99 mg tablet Take 99 mg by mouth daily. Yes Historical Provider   metFORMIN ER (GLUCOPHAGE XR) 500 mg tablet Take 1 Tab by mouth two (2) times a day. 7/21/17  Yes Kimi Connell MD   B.infantis-B.ani-B.long-B.bifi (PROBIOTIC 4X) 10-15 mg TbEC Take  by mouth daily. Yes Historical Provider   Jaja Stone LANCETS 30 gauge misc  2/6/16  Yes Historical Provider   glucose blood VI test strips (ASCENSIA AUTODISC VI, ONE TOUCH ULTRA TEST VI) strip Use to check blood sugar once daily. 2/5/16  Yes Lakisha Maguire MD   Blood-Glucose Meter monitoring kit Use as directed. Dx: E11.9 2/5/16  Yes Lakisha Maguire MD   Lancets misc Use as directed. Dx: E11.9 2/5/16  Yes Lakisha Maguire MD   magnesium 250 mg Tab Take  by mouth. Yes Historical Provider   omega-3 fatty acids-vitamin e (FISH OIL) 1,000 mg Cap Take 2 Caps by mouth. Yes Historical Provider   turmeric root extract 500 mg Cap Take  by mouth. Yes Historical Provider   melatonin 3 mg tablet Take 3 mg by mouth nightly. Yes Historical Provider     Current Outpatient Prescriptions   Medication Sig    triamcinolone (NASACORT) 55 mcg nasal inhaler 2 Sprays nightly.  loratadine (CLARITIN) 10 mg tablet Take 10 mg by mouth daily.  coenzyme q10 (CO Q-10) 10 mg cap Take 100 mg by mouth daily.  ascorbic acid, vitamin C, (VITAMIN C) 500 mg tablet Take 1,000 mg by mouth daily.  aspirin delayed-release 81 mg tablet Take 81 mg by mouth daily.  garlic 2,650 mg cap Take 1,500 mg by mouth daily.  zinc 50 mg tab tablet Take 50 mg by mouth daily.  potassium 99 mg tablet Take 99 mg by mouth daily.  metFORMIN ER (GLUCOPHAGE XR) 500 mg tablet Take 1 Tab by mouth two (2) times a day.  B.infantis-B.ani-B.long-B.bifi (PROBIOTIC 4X) 10-15 mg TbEC Take  by mouth daily.  ONETOUCH DELICA LANCETS 30 gauge misc     glucose blood VI test strips (ASCENSIA AUTODISC VI, ONE TOUCH ULTRA TEST VI) strip Use to check blood sugar once daily.  Blood-Glucose Meter monitoring kit Use as directed. Dx: E11.9    Lancets misc Use as directed. Dx: E11.9    magnesium 250 mg Tab Take  by mouth.  omega-3 fatty acids-vitamin e (FISH OIL) 1,000 mg Cap Take 2 Caps by mouth.  turmeric root extract 500 mg Cap Take  by mouth.  melatonin 3 mg tablet Take 3 mg by mouth nightly. No current facility-administered medications for this encounter. Allergies   Allergen Reactions    Ceclor [Cefaclor] Rash     Amilcar Trinidad    Sulfa (Sulfonamide Antibiotics) Anaphylaxis, Shortness of Breath and Rash    Invokana [Canagliflozin] Other (comments)     Alopecia and yeast     Azithromycin Other (comments)     Stomach issues    Azithromycin Nausea Only     Pt states she is allergic to all mycin drugs.  Ciprofloxacin Other (comments)     Headache    Lexapro [Escitalopram] Other (comments)     Pt states she had arm pains and vision changes.     Losartan Cough    Tetracycline Other (comments)     Stomach issues      Tetracycline Nausea Only    Zyrtec [Cetirizine] Other (comments)     Pt states that it makes her heart race      Social History   Substance Use Topics    Smoking status: Never Smoker    Smokeless tobacco: Never Used    Alcohol use 0.0 oz/week     0 Standard drinks or equivalent per week      Comment: very rare      History   Drug Use No     Family History   Problem Relation Age of Onset    Hypertension Mother     Cancer Father      lung, ?brain    Cancer Paternal Aunt     Hypertension Sister      x 2    Arthritis-osteo Sister     Coronary Artery Disease Brother      stent    Arthritis-osteo Brother     No Known Problems Brother     Lung Disease Brother          Review of Systems    Patient denies difficulty swallowing, mouth sores, or loose teeth. Patient denies any recent dental procedures or any planned prior to surgery. Patient denies chest pain, tightness, pain radiating down left arm, palpitations. Denies dizziness, visual disturbances, or lightheadedness. Patient denies shortness of breath, wheezing, cough, fever, or chills. Patient denies diarrhea, constipation, or abdominal pain. Patient denies urinary problems including dysuria, hesitancy, urgency, or incontinence. Denies skin breakdown, rashes, insect bites or open area. Objective:   Patient Vitals for the past 24 hrs:   Temp Pulse Resp BP SpO2   17 0803 98.2 °F (36.8 °C) 83 18 149/66 96 %     Temp (24hrs), Av.2 °F (36.8 °C), Min:98.2 °F (36.8 °C), Max:98.2 °F (36.8 °C)    Body mass index is 32.3 kg/(m^2). Wt Readings from Last 1 Encounters:   17 84 kg (185 lb 4 oz)        Physical Exam:     General: Pleasant,  cooperative, no apparent distress, appears stated age. Eyes: Conjunctivae/corneas clear. EOMs intact. Nose: Nares normal.   Mouth/Throat: Lips, mucosa, and tongue normal. Teeth and gums normal.   Neck: Supple, symmetrical, trachea midline. Back: Symmetric   Lungs: Clear to auscultation bilaterally. Heart: Regular rate and rhythm, S1, S2 normal. No murmur, click, rub or gallop. Abdomen: Soft, non-tender. Bowel sounds normal. No distention. Musculoskeletal:  Unremarkable. Extremities:  Extremities normal, atraumatic, no cyanosis or edema. Calves                                 supple, non tender to palpation. Pulses: 2+ and symmetric bilateral upper extremities. Cap. refill <2 seconds   Skin: Skin color, texture, turgor normal.  No rashes or lesions.    Neurologic: CN II-XII grossly intact. Alert and oriented x3. Labs: No results found for this or any previous visit (from the past 72 hour(s)). Assessment:     Macromastia    Plan:     Scheduled for bilateral breast reduction. CMP and EKG done per anesthesia protocol. Lab results and EKG reviewed- unremarkable.         Rigoberto Briggs NP

## 2017-11-06 NOTE — PERIOP NOTES
[facility]  PREOPERATIVE INSTRUCTIONS    Surgery Date:   11/21/2017    Surgery arrival time given by surgeon: NO  (If [facility] staff will call you between 3pm - 7pm the day before surgery with your arrival time. If your surgery is on a Monday, we will call you the preceding Friday. Please call [phone] after 7pm if you did not receive your arrival time.)  1. Report  to the 2nd Floor Admitting Desk on the day of your surgery. Bring your insurance card, photo identification, and any copayment (if applicable). 2. You must have a responsible adult to drive you home and stay with you the first 24 hours after surgery if you are going home the same day of your surgery. 3. Nothing to eat or drink after midnight the night before surgery. This means NO water, gum, mints, coffee, juice, etc.    4. MEDICATIONS TO TAKE THE MORNING OF SURGERY WITH A SIP OF WATER:none  5. No alcoholic beverages 24 hours before and after your surgery. 6. If you are being admitted to the hospital,please leave personal belongings/luggage in your car until you have an assigned hospital room number. ( The hospital discharge time is 12 PM NOON. Your adult  should be at the hospital prior to the noon discharge time unless otherwise instructed.)   7. STOP Aspirin and/or any non-steroidal anti-inflammatory drugs (i.e. Ibuprofen, Naproxen, Advil, Aleve) as directed by your surgeon. You may take Tylenol. Stop herbal supplements 1 week prior to  surgery. 8. If you are currently taking Plavix, Coumadin,or any other blood-thinning/ anticoagulant medication contact your surgeon for instructions. 9. Wear comfortable clothes. Wear your glasses instead of contacts. Please leave all money, jewelry and valuables at home. No make up, particularly mascara, the day of surgery. 10.  REMOVE ALL body piercings, rings,and jewelry and leave at home. Wear your hair loose or down, no pony-tails, buns, or any metal hair clips.    11. If you shower the morning of surgery, please do not apply any lotions, powders, or deodorants afterwards. Do not shave any body area within 24 hours of your surgery. 12. Please follow all instructions to avoid any potential surgical cancellation. 13. Should your physical condition change, (i.e. fever, cold, flu, etc.) please notify your surgeon as soon as possible. 14. It is important to be on time. If a situation occurs where you may be delayed, please call:  [phone] /  on the day of surgery. 15. The Preadmission Testing staff can be reached at 21 [phone]. 16. Special instructions: Free  Parking  17. The patient was contacted  in person. She  verbalize  understanding of all instructions does not  need reinforcement.

## 2017-11-20 ENCOUNTER — ANESTHESIA EVENT (OUTPATIENT)
Dept: SURGERY | Age: 57
End: 2017-11-20
Payer: COMMERCIAL

## 2017-11-20 NOTE — PERIOP NOTES
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 Left a VM for Sigrid Rodriguez at Dr. Radha Ventura office requesting an alternative preoperative antibiotic order be faxed to PAT if before 1500 or to Main preop if after 1500 today.   DOS: 11/21/2017

## 2017-11-21 ENCOUNTER — HOSPITAL ENCOUNTER (OUTPATIENT)
Age: 57
Setting detail: OUTPATIENT SURGERY
Discharge: HOME | End: 2017-11-21
Attending: PLASTIC SURGERY | Admitting: PLASTIC SURGERY
Payer: COMMERCIAL

## 2017-11-21 ENCOUNTER — ANESTHESIA (OUTPATIENT)
Dept: SURGERY | Age: 57
End: 2017-11-21
Payer: COMMERCIAL

## 2017-11-21 VITALS
DIASTOLIC BLOOD PRESSURE: 66 MMHG | HEART RATE: 82 BPM | OXYGEN SATURATION: 98 % | RESPIRATION RATE: 20 BRPM | TEMPERATURE: 97.7 F | SYSTOLIC BLOOD PRESSURE: 159 MMHG

## 2017-11-21 LAB
GLUCOSE BLD STRIP.AUTO-MCNC: 124 MG/DL (ref 65–100)
GLUCOSE BLD STRIP.AUTO-MCNC: 134 MG/DL (ref 65–100)
SERVICE CMNT-IMP: ABNORMAL
SERVICE CMNT-IMP: ABNORMAL

## 2017-11-21 PROCEDURE — 77030013079 HC BLNKT BAIR HGGR 3M -A: Performed by: NURSE ANESTHETIST, CERTIFIED REGISTERED

## 2017-11-21 PROCEDURE — 76210000020 HC REC RM PH II FIRST 0.5 HR: Performed by: PLASTIC SURGERY

## 2017-11-21 PROCEDURE — 77030003666 HC NDL SPINAL BD -A: Performed by: PLASTIC SURGERY

## 2017-11-21 PROCEDURE — 77030010512 HC APPL CLP LIG J&J -C: Performed by: PLASTIC SURGERY

## 2017-11-21 PROCEDURE — 74011000250 HC RX REV CODE- 250

## 2017-11-21 PROCEDURE — 77030020061 HC IV BLD WRMR ADMIN SET 3M -B: Performed by: NURSE ANESTHETIST, CERTIFIED REGISTERED

## 2017-11-21 PROCEDURE — 82962 GLUCOSE BLOOD TEST: CPT

## 2017-11-21 PROCEDURE — 77030020782 HC GWN BAIR PAWS FLX 3M -B

## 2017-11-21 PROCEDURE — 77030002991 HC SUT QUILL SSPC -B: Performed by: PLASTIC SURGERY

## 2017-11-21 PROCEDURE — 76210000006 HC OR PH I REC 0.5 TO 1 HR: Performed by: PLASTIC SURGERY

## 2017-11-21 PROCEDURE — 74011250636 HC RX REV CODE- 250/636

## 2017-11-21 PROCEDURE — 77030032490 HC SLV COMPR SCD KNE COVD -B: Performed by: PLASTIC SURGERY

## 2017-11-21 PROCEDURE — 77030031139 HC SUT VCRL2 J&J -A: Performed by: PLASTIC SURGERY

## 2017-11-21 PROCEDURE — 77030008771 HC TU NG SALEM SUMP -A: Performed by: NURSE ANESTHETIST, CERTIFIED REGISTERED

## 2017-11-21 PROCEDURE — 88305 TISSUE EXAM BY PATHOLOGIST: CPT | Performed by: PLASTIC SURGERY

## 2017-11-21 PROCEDURE — 74011000250 HC RX REV CODE- 250: Performed by: PLASTIC SURGERY

## 2017-11-21 PROCEDURE — 76060000036 HC ANESTHESIA 2.5 TO 3 HR: Performed by: PLASTIC SURGERY

## 2017-11-21 PROCEDURE — 77030019908 HC STETH ESOPH SIMS -A: Performed by: NURSE ANESTHETIST, CERTIFIED REGISTERED

## 2017-11-21 PROCEDURE — 77030027413 HC ADH SKN AESC -B: Performed by: PLASTIC SURGERY

## 2017-11-21 PROCEDURE — 77030008684 HC TU ET CUF COVD -B: Performed by: NURSE ANESTHETIST, CERTIFIED REGISTERED

## 2017-11-21 PROCEDURE — 74011250636 HC RX REV CODE- 250/636: Performed by: ANESTHESIOLOGY

## 2017-11-21 PROCEDURE — 76010000132 HC OR TIME 2.5 TO 3 HR: Performed by: PLASTIC SURGERY

## 2017-11-21 PROCEDURE — 77030008463 HC STPLR SKN PROX J&J -B: Performed by: PLASTIC SURGERY

## 2017-11-21 PROCEDURE — 77030016570 HC BLNKT BAIR HGGR 3M -B: Performed by: PLASTIC SURGERY

## 2017-11-21 PROCEDURE — 77030002933 HC SUT MCRYL J&J -A: Performed by: PLASTIC SURGERY

## 2017-11-21 PROCEDURE — 74011250637 HC RX REV CODE- 250/637: Performed by: ANESTHESIOLOGY

## 2017-11-21 RX ORDER — SUCCINYLCHOLINE CHLORIDE 20 MG/ML
INJECTION INTRAMUSCULAR; INTRAVENOUS AS NEEDED
Status: DISCONTINUED | OUTPATIENT
Start: 2017-11-21 | End: 2017-11-21 | Stop reason: HOSPADM

## 2017-11-21 RX ORDER — CEFAZOLIN SODIUM IN 0.9 % NACL 2 G/50 ML
2 INTRAVENOUS SOLUTION, PIGGYBACK (ML) INTRAVENOUS ONCE
Status: DISCONTINUED | OUTPATIENT
Start: 2017-11-21 | End: 2017-11-21 | Stop reason: HOSPADM

## 2017-11-21 RX ORDER — CEFAZOLIN SODIUM 1 G/3ML
INJECTION, POWDER, FOR SOLUTION INTRAMUSCULAR; INTRAVENOUS AS NEEDED
Status: DISCONTINUED | OUTPATIENT
Start: 2017-11-21 | End: 2017-11-21 | Stop reason: HOSPADM

## 2017-11-21 RX ORDER — NEOSTIGMINE METHYLSULFATE 1 MG/ML
INJECTION INTRAVENOUS AS NEEDED
Status: DISCONTINUED | OUTPATIENT
Start: 2017-11-21 | End: 2017-11-21 | Stop reason: HOSPADM

## 2017-11-21 RX ORDER — SODIUM CHLORIDE 0.9 % (FLUSH) 0.9 %
5-10 SYRINGE (ML) INJECTION EVERY 8 HOURS
Status: DISCONTINUED | OUTPATIENT
Start: 2017-11-21 | End: 2017-11-21 | Stop reason: HOSPADM

## 2017-11-21 RX ORDER — SODIUM CHLORIDE, SODIUM LACTATE, POTASSIUM CHLORIDE, CALCIUM CHLORIDE 600; 310; 30; 20 MG/100ML; MG/100ML; MG/100ML; MG/100ML
100 INJECTION, SOLUTION INTRAVENOUS CONTINUOUS
Status: DISCONTINUED | OUTPATIENT
Start: 2017-11-21 | End: 2017-11-21 | Stop reason: HOSPADM

## 2017-11-21 RX ORDER — PROPOFOL 10 MG/ML
INJECTION, EMULSION INTRAVENOUS AS NEEDED
Status: DISCONTINUED | OUTPATIENT
Start: 2017-11-21 | End: 2017-11-21 | Stop reason: HOSPADM

## 2017-11-21 RX ORDER — ONDANSETRON 2 MG/ML
INJECTION INTRAMUSCULAR; INTRAVENOUS AS NEEDED
Status: DISCONTINUED | OUTPATIENT
Start: 2017-11-21 | End: 2017-11-21 | Stop reason: HOSPADM

## 2017-11-21 RX ORDER — LIDOCAINE HYDROCHLORIDE 10 MG/ML
0.1 INJECTION, SOLUTION EPIDURAL; INFILTRATION; INTRACAUDAL; PERINEURAL AS NEEDED
Status: DISCONTINUED | OUTPATIENT
Start: 2017-11-21 | End: 2017-11-21 | Stop reason: HOSPADM

## 2017-11-21 RX ORDER — DEXAMETHASONE SODIUM PHOSPHATE 4 MG/ML
INJECTION, SOLUTION INTRA-ARTICULAR; INTRALESIONAL; INTRAMUSCULAR; INTRAVENOUS; SOFT TISSUE AS NEEDED
Status: DISCONTINUED | OUTPATIENT
Start: 2017-11-21 | End: 2017-11-21 | Stop reason: HOSPADM

## 2017-11-21 RX ORDER — SODIUM CHLORIDE 0.9 % (FLUSH) 0.9 %
5-10 SYRINGE (ML) INJECTION AS NEEDED
Status: DISCONTINUED | OUTPATIENT
Start: 2017-11-21 | End: 2017-11-21 | Stop reason: HOSPADM

## 2017-11-21 RX ORDER — ROCURONIUM BROMIDE 10 MG/ML
INJECTION, SOLUTION INTRAVENOUS AS NEEDED
Status: DISCONTINUED | OUTPATIENT
Start: 2017-11-21 | End: 2017-11-21 | Stop reason: HOSPADM

## 2017-11-21 RX ORDER — GLYCOPYRROLATE 0.2 MG/ML
INJECTION INTRAMUSCULAR; INTRAVENOUS AS NEEDED
Status: DISCONTINUED | OUTPATIENT
Start: 2017-11-21 | End: 2017-11-21 | Stop reason: HOSPADM

## 2017-11-21 RX ORDER — LIDOCAINE HYDROCHLORIDE 20 MG/ML
INJECTION, SOLUTION EPIDURAL; INFILTRATION; INTRACAUDAL; PERINEURAL AS NEEDED
Status: DISCONTINUED | OUTPATIENT
Start: 2017-11-21 | End: 2017-11-21 | Stop reason: HOSPADM

## 2017-11-21 RX ORDER — FENTANYL CITRATE 50 UG/ML
INJECTION, SOLUTION INTRAMUSCULAR; INTRAVENOUS AS NEEDED
Status: DISCONTINUED | OUTPATIENT
Start: 2017-11-21 | End: 2017-11-21 | Stop reason: HOSPADM

## 2017-11-21 RX ORDER — HYDROMORPHONE HYDROCHLORIDE 1 MG/ML
.25-1 INJECTION, SOLUTION INTRAMUSCULAR; INTRAVENOUS; SUBCUTANEOUS
Status: DISCONTINUED | OUTPATIENT
Start: 2017-11-21 | End: 2017-11-21 | Stop reason: HOSPADM

## 2017-11-21 RX ORDER — MIDAZOLAM HYDROCHLORIDE 1 MG/ML
INJECTION, SOLUTION INTRAMUSCULAR; INTRAVENOUS AS NEEDED
Status: DISCONTINUED | OUTPATIENT
Start: 2017-11-21 | End: 2017-11-21 | Stop reason: HOSPADM

## 2017-11-21 RX ORDER — SCOLOPAMINE TRANSDERMAL SYSTEM 1 MG/1
1.5 PATCH, EXTENDED RELEASE TRANSDERMAL ONCE
Status: DISCONTINUED | OUTPATIENT
Start: 2017-11-21 | End: 2017-11-21 | Stop reason: HOSPADM

## 2017-11-21 RX ADMIN — PROPOFOL 200 MG: 10 INJECTION, EMULSION INTRAVENOUS at 08:14

## 2017-11-21 RX ADMIN — HYDROMORPHONE HYDROCHLORIDE 1 MG: 1 INJECTION, SOLUTION INTRAMUSCULAR; INTRAVENOUS; SUBCUTANEOUS at 11:28

## 2017-11-21 RX ADMIN — HYDROMORPHONE HYDROCHLORIDE 0.5 MG: 1 INJECTION, SOLUTION INTRAMUSCULAR; INTRAVENOUS; SUBCUTANEOUS at 11:14

## 2017-11-21 RX ADMIN — SODIUM CHLORIDE, SODIUM LACTATE, POTASSIUM CHLORIDE, AND CALCIUM CHLORIDE 100 ML/HR: 600; 310; 30; 20 INJECTION, SOLUTION INTRAVENOUS at 06:48

## 2017-11-21 RX ADMIN — MIDAZOLAM HYDROCHLORIDE 2.5 MG: 1 INJECTION, SOLUTION INTRAMUSCULAR; INTRAVENOUS at 08:09

## 2017-11-21 RX ADMIN — FENTANYL CITRATE 100 MCG: 50 INJECTION, SOLUTION INTRAMUSCULAR; INTRAVENOUS at 08:30

## 2017-11-21 RX ADMIN — ROCURONIUM BROMIDE 15 MG: 10 INJECTION, SOLUTION INTRAVENOUS at 08:45

## 2017-11-21 RX ADMIN — ROCURONIUM BROMIDE 5 MG: 10 INJECTION, SOLUTION INTRAVENOUS at 08:14

## 2017-11-21 RX ADMIN — FENTANYL CITRATE 100 MCG: 50 INJECTION, SOLUTION INTRAMUSCULAR; INTRAVENOUS at 08:18

## 2017-11-21 RX ADMIN — LIDOCAINE HYDROCHLORIDE 40 MG: 20 INJECTION, SOLUTION EPIDURAL; INFILTRATION; INTRACAUDAL; PERINEURAL at 08:14

## 2017-11-21 RX ADMIN — NEOSTIGMINE METHYLSULFATE 1 MG: 1 INJECTION INTRAVENOUS at 10:42

## 2017-11-21 RX ADMIN — ROCURONIUM BROMIDE 30 MG: 10 INJECTION, SOLUTION INTRAVENOUS at 08:15

## 2017-11-21 RX ADMIN — GLYCOPYRROLATE 0.4 MG: 0.2 INJECTION INTRAMUSCULAR; INTRAVENOUS at 10:39

## 2017-11-21 RX ADMIN — FENTANYL CITRATE 100 MCG: 50 INJECTION, SOLUTION INTRAMUSCULAR; INTRAVENOUS at 09:21

## 2017-11-21 RX ADMIN — CEFAZOLIN SODIUM 2 G: 1 INJECTION, POWDER, FOR SOLUTION INTRAMUSCULAR; INTRAVENOUS at 08:17

## 2017-11-21 RX ADMIN — SUCCINYLCHOLINE CHLORIDE 100 MG: 20 INJECTION INTRAMUSCULAR; INTRAVENOUS at 08:14

## 2017-11-21 RX ADMIN — ONDANSETRON 4 MG: 2 INJECTION INTRAMUSCULAR; INTRAVENOUS at 10:26

## 2017-11-21 RX ADMIN — FENTANYL CITRATE 50 MCG: 50 INJECTION, SOLUTION INTRAMUSCULAR; INTRAVENOUS at 08:09

## 2017-11-21 RX ADMIN — DEXAMETHASONE SODIUM PHOSPHATE 8 MG: 4 INJECTION, SOLUTION INTRA-ARTICULAR; INTRALESIONAL; INTRAMUSCULAR; INTRAVENOUS; SOFT TISSUE at 08:15

## 2017-11-21 NOTE — PERIOP NOTES
300 ml Tumescent injected by MD to each breast  Excised Tissue Left: 560 grams  Excised Tissue Right: 508 grams

## 2017-11-21 NOTE — OP NOTES
[facility]   [address] [facility], [address]   OP NOTE       Name:  Liliane Aleman   MR#:  [id number]   :  1960   Account #:  [de-identified]    Surgery Date:  2017   Date of Adm:  2017       PREOPERATIVE DIAGNOSIS: Macromastia, symptomatic. POSTOPERATIVE DIAGNOSIS: Macromastia, symptomatic. PROCEDURE PERFORMED: Bilateral breast reduction. SURGEON: Oral Jeter MD    ASSISTANT: None. ANESTHESIA: General anesthesia. FLUIDS: 1000 mL crystalloid. ESTIMATED BLOOD LOSS: 50 mL. URINE OUTPUT: Not recorded. COMPLICATIONS: None. FINDINGS: As above. SPECIMENS REMOVED: Right and left breast tissue to Pathology. INDICATIONS FOR SURGERY: The patient is a very pleasant 59  year-old female who came to me with concerns of the size of her   breasts and the symptoms and around it. Please see my clinic note for   full details of her history and physical, as well as her risks and   complications associated with the surgery, as well as a discussion of   surgery in layman's terms. She understood the surgery in layman's   terms, as well as the scars associated with it. She also understood the   risks and complications associated with the surgery including, but not   limited to bleeding, infection, scarring, pain, hematoma, seroma,   contour irregularities, numbness, tingling, damage to surrounding   structures, intraabdominal and intrathoracic wound healing problems,   loss of nipple partial or full, unable to breast feed, loss of sensation to   the nipple, breast asymmetry, scarring, keloid scarring, risk of   anesthesia, cardiovascular and respiratory risks, as well as risk of   DVTs and many others. She understood all these and many others   and wished to proceed with surgery. DESCRIPTION OF PROCEDURE: The patient was seen in the   preoperative evaluation area, where surgical consent was signed.  All   the major risks and complications again were discussed with the   patient. She understood everything fully. She was marked in the   standing position. She was taken back to the operating room, laid in   supine position. General anesthesia was induced in uncomplicated   fashion. A full time-out was performed with everyone present and   everyone agreed. I infused my tumescent solution of 300 mL into   bilateral breast incisions. I then turned my attention to the right breast.    I marked out the nipple areola with a 42 mm cookie cutter. I marked   out an inferior pedicle Wise pattern reduction. I made the incision with   a #10 blade. I then de-epithelialized the inferior pedicle. I used   monopolar cautery to get down to the medial triangle. Care was taken   to leave a wide base of the pedicle. I then did the same thing with the   lateral triangle. I made thick mastectomy flaps, and got up to the   pectoralis muscle and raised it slightly off the pectoralis muscle in a   uniform fashion. I took the monopolar cautery and was able to take out   the distal portion of this pedicle as my central part. All these were   weighed on the back table. The total weight on the right was 528 g. This gave her good shape and size. I irrigated this out with a L of   normal saline. Hemostasis was meticulously achieved with monopolar   cautery. I then closed this triple point with a 2-0 Vicryl stitch and   temporarily closed it with staples. I turned my attention to the left side. The same exact thing was done on the left side. I marked out an   inferior pedicle Wise pattern reduction with a 42 mm cookie cutter. I   de-epithelialized the inferior pedicle. I made the rest of my incisions. I   excised the medial triangle, and then I excised the lateral triangle. I   made sure to leave a wide base of my pedicle base. I made thick   mastectomy flaps. The distal portion of this pedicle was excised as the   central pedicle.  This side was weighed and sent to Pathology as left   breast tissue. Total weight on this side was 560 g. This was irrigated   out and hemostasis was meticulously achieved. Temporarily closed   this side. There was good symmetry between the 2 sides. I marked the   new nipple-areolar complex with 42 mm cookie cutters bilaterally. I   then put this together with 3-0 Monocryl deep dermis in the vertical and   horizontal aspect, as well as around the areola. I then ran a 4-0   subcuticular stitch around the areola bilaterally. I then closed the   inframammary fold area with a 2-0 Monoderm Stratafix. Skin glue was   applied to the incisions. The pedicles were nice, pink and warm at the   end of the procedure. There were no complications. The patient was   then allowed to awaken from anesthesia in an uncomplicated fashion. ABDs and a surgical bra were placed.         Prema Moise MD      MS / [facility]   D:  11/21/2017   11:04   T:  11/21/2017   13:33   Job #:  [id number]

## 2017-11-21 NOTE — H&P
Pre-op History and Physical    CC: MACROMASTIA   HPI: 59y.o. year old female with MACROMASTIA for Procedure(s):  BILATERAL BREAST REDUCTION.   Past medical history:   Past Medical History:   Diagnosis Date    Anemia     resolved with menopause    Arthritis     degenerative     Chronic pain     right hip    Diabetes mellitus type 2, controlled ([address] 2015    Elevated liver enzymes     H/O bone density study 10/12    normal    H/O seasonal allergies     Hx of mammogram 12    Hypercalcemia 2017    Hypertension     resolved last in May    Nausea & vomiting     Other and unspecified hyperlipidemia 2015    Pap smear for cervical cancer screening     Psychiatric disorder     anxiety no meds      Past surgical history:   Past Surgical History:   Procedure Laterality Date    HX ABDOMINOPLASTY      HX  SECTION  8954,5446    HX CYST REMOVAL      HX CYST REMOVAL      right wrist    HX GI      colonoscopy    HX HEENT Right     Lasik    HX LIPECTOMY  2001    HX ORTHOPAEDIC  2016    back surgery L3- L5    HX OTHER SURGICAL      surgery to remove plantar wart    HX TONSILLECTOMY      HX TONSILLECTOMY  1967    MULTIPLE DELIVERY         Family history:   Family History   Problem Relation Age of Onset    Hypertension Mother     Cancer Father      lung, ?brain    Cancer Paternal Aunt     Hypertension Sister      x 2    Arthritis-osteo Sister     Coronary Artery Disease Brother      stent    Arthritis-osteo Brother     No Known Problems Brother     Lung Disease Brother       Social history:   Social History     Social History    Marital status: SINGLE     Spouse name: Single    Number of children: 2    Years of education: BA     Occupational History    NP      [address] [employer]     Social History Main Topics    Smoking status: Never Smoker    Smokeless tobacco: Never Used    Alcohol use 0.0 oz/week     0 Standard drinks or equivalent per week      Comment: very rare    Drug use: No    Sexual activity: Not Currently     Other Topics Concern    Not on file     Social History Narrative    ** Merged History Encounter **           Home Medications:   Prior to Admission medications    Medication Sig Start Date End Date Taking? Authorizing Provider   ascorbic acid, vitamin C, (VITAMIN C) 500 mg tablet Take 1,000 mg by mouth daily. Yes Historical Provider   Blessing Justin LANCETS 30 gauge misc  2/6/16  Yes Historical Provider   glucose blood VI test strips (ASCENSIA AUTODISC VI, ONE TOUCH ULTRA TEST VI) strip Use to check blood sugar once daily. 2/5/16  Yes Asif Montez MD   Blood-Glucose Meter monitoring kit Use as directed. Dx: E11.9 2/5/16  Yes Asif Montez MD   Lancets misc Use as directed. Dx: E11.9 2/5/16  Yes Asif Montez MD   triamcinolone (NASACORT) 55 mcg nasal inhaler 2 Sprays nightly. Historical Provider   loratadine (CLARITIN) 10 mg tablet Take 10 mg by mouth daily. Historical Provider   coenzyme q10 (CO Q-10) 10 mg cap Take 100 mg by mouth daily. Historical Provider   aspirin delayed-release 81 mg tablet Take 81 mg by mouth daily. Historical Provider   garlic 4,421 mg cap Take 1,500 mg by mouth daily. Historical Provider   zinc 50 mg tab tablet Take 50 mg by mouth daily. Historical Provider   potassium 99 mg tablet Take 99 mg by mouth daily. Historical Provider   metFORMIN ER (GLUCOPHAGE XR) 500 mg tablet Take 1 Tab by mouth two (2) times a day. 7/21/17   Naz Lim MD   B.infantis-B.ani-B.long-B.bifi (PROBIOTIC 4X) 10-15 mg TbEC Take  by mouth daily. Historical Provider   magnesium 250 mg Tab Take  by mouth. Historical Provider   omega-3 fatty acids-vitamin e (FISH OIL) 1,000 mg Cap Take 2 Caps by mouth. Historical Provider   turmeric root extract 500 mg Cap Take  by mouth. Historical Provider   melatonin 3 mg tablet Take 3 mg by mouth nightly.     Historical Provider Allergies: Allergies   Allergen Reactions    Ceclor [Cefaclor] Rash     RobertMarlys Amos    Sulfa (Sulfonamide Antibiotics) Anaphylaxis, Shortness of Breath and Rash    Invokana [Canagliflozin] Other (comments)     Alopecia and yeast     Azithromycin Other (comments)     Stomach issues    Azithromycin Nausea Only     Pt states she is allergic to all mycin drugs.  Ciprofloxacin Other (comments)     Headache    Lexapro [Escitalopram] Other (comments)     Pt states she had arm pains and vision changes.  Losartan Cough    Tetracycline Other (comments)     Stomach issues      Tetracycline Nausea Only    Zyrtec [Cetirizine] Other (comments)     Pt states that it makes her heart race      Review of systems:  Denies headache, fever, chills, weight change, congestion, sore throat, chest pain, shortness of breath, nausea, vomiting, diarrhea, constipation, abdominal pain, generalized weakness, muscle or joint pain, and rash. Physical Exam:  Vitals: Blood pressure 143/64, pulse 79, temperature 97.7 °F (36.5 °C), resp. rate 16, SpO2 98 %.    General: awake and alert, NAD  Neck: supple  Cor: RR  Lungs: clear  Abdomen: soft, non-tender, non-distended, overweight  Extremities: no edema  Skin: under breast intertrigo    Impression: MACROMASTIA    Plan:  Procedure(s):  BILATERAL BREAST REDUCTION

## 2017-11-21 NOTE — BRIEF OP NOTE
BRIEF OPERATIVE NOTE    Date of Procedure: 11/21/2017   Preoperative Diagnosis: MACROMASTIA  Postoperative Diagnosis: MACROMASTIA    Procedure(s):  BILATERAL BREAST REDUCTION  Surgeon(s) and Role:     * Casey Freeman MD - Primary         Assistant Staff:       Surgical Staff:  Circ-1: Kenya Dykes RN  Circ-Relief: Luther Galeazzi, RN  Scrub Tech-1: Tong Musa  Scrub RN-Relief: Avery Zelaya RN  Surg Asst-1: Ephriam   Float Staff: Avery Zelaya RN  Event Time In   Incision Start 6436   Incision Close 1032     Anesthesia: General   Estimated Blood Loss: 50cc  Specimens:   ID Type Source Tests Collected by Time Destination   1 : Excised Right Breast Tissue Preservative Breast  Casey Freeman MD 11/21/2017 0845 Pathology   2 : Excised Left Breast Tissue Preservative Breast  Casey Freeman MD 11/21/2017 4424 Pathology      Findings: healthy pedicle   Complications: none  Implants: * No implants in log *

## 2017-11-21 NOTE — IP AVS SNAPSHOT
[address] 
[phone] Patient: Nate Saini MRN: [id number] PTI:6/3/1350 About your hospitalization You were admitted on:  November 21, 2017 You last received care in the:  [facility] PACU You were discharged on:  November 21, 2017 Why you were hospitalized Your primary diagnosis was:  Not on File Things You Need To Do (next 8 weeks) Follow up with Rayvon Oppenheim, MD  
  
Phone:  [phone] Where:  [address] [place] [address] , [facility], [address] [id number] Wednesday Nov 29, 2017 COMPLETE PHYSICAL with Rayvon Oppenheim, MD at 10:00 AM  
Where: [address] [facility] ([facility]) Discharge Orders None A check connor indicates which time of day the medication should be taken. My Medications STOP taking these medications   
 aspirin delayed-release 81 mg tablet FISH OIL 1,000 mg Cap Generic drug:  omega-3 fatty acids-vitamin e  
   
  
 garlic 5,036 mg Cap  
   
  
 turmeric root extract 500 mg Cap TAKE these medications as instructed Instructions Each Dose to Equal  
 Morning Noon Evening Bedtime  
 ascorbic acid (vitamin C) 500 mg tablet Commonly known as:  VITAMIN C Your last dose was: Your next dose is: Take 1,000 mg by mouth daily. 1000 mg Blood-Glucose Meter monitoring kit Your last dose was: Your next dose is:    
   
   
 Use as directed. Dx: E11.9 CLARITIN 10 mg tablet Generic drug:  loratadine Your last dose was: Your next dose is: Take 10 mg by mouth daily. 10 mg  
    
   
   
   
  
 CO Q-10 10 mg Cap Generic drug:  coenzyme q10 Your last dose was: Your next dose is: Take 100 mg by mouth daily.   
 100 mg  
    
   
   
   
  
 glucose blood VI test strips strip Commonly known as:  ASCENSIA AUTODISC VI, ONE TOUCH ULTRA TEST VI Your last dose was: Your next dose is:    
   
   
 Use to check blood sugar once daily. * Lancets Misc Your last dose was: Your next dose is:    
   
   
 Use as directed. Dx: E11.9  
     
   
   
   
  
 * ROMANTOUCH DELICA LANCETS 30 gauge Misc Generic drug:  lancets Your last dose was: Your next dose is:    
   
   
      
   
   
   
  
 magnesium 250 mg Tab Your last dose was: Your next dose is: Take  by mouth.  
     
   
   
   
  
 melatonin 3 mg tablet Your last dose was: Your next dose is: Take 3 mg by mouth nightly. 3 mg  
    
   
   
   
  
 metFORMIN  mg tablet Commonly known as:  GLUCOPHAGE XR Your last dose was: Your next dose is: Take 1 Tab by mouth two (2) times a day. 500 mg  
    
   
   
   
  
 NASACORT 55 mcg nasal inhaler Generic drug:  triamcinolone Your last dose was: Your next dose is: 2 Sprays nightly. 2 Spray  
    
   
   
   
  
 potassium 99 mg tablet Your last dose was: Your next dose is: Take 99 mg by mouth daily. 99 mg PROBIOTIC 4X 10-15 mg Tbec Generic drug:  B.infantis-B.ani-B.long-B.bifi Your last dose was: Your next dose is: Take  by mouth daily. zinc 50 mg Tab tablet Your last dose was: Your next dose is: Take 50 mg by mouth daily. 50 mg  
    
   
   
   
  
 * Notice: This list has 2 medication(s) that are the same as other medications prescribed for you. Read the directions carefully, and ask your doctor or other care provider to review them with you. Discharge Instructions Juan Jose Mckeon [facility]  Bandar Moreira M.D., F.A.C.S. 
[phone] 
 
breast reduction post-operative instructions 1. Surgical Bra:  You will be in a surgical bra following the procedure, with bandages covering the skin glue on the incisions. You may remove the bandages 24 hours after your surgery. You can shower at that point. The fitted post-operative bra should be worn at all times except when showering for the first two weeks. There may be a small amount of oozing from the incisions for the first several days. This is normal.  The bra should be washed when it gets soiled. 2. Support bra:  After the first two weeks, you may wear a sports-type bra that fastens in the front and has NO UNDERWIRE. This should be worn day and night, except when showering, for one month after your date of surgery. 3. Activity:  Take it easy for the first several days. No cleaning, housework, or strenuous activity. Do not lift anything over 10 pounds, including children. You may resume non-vigorous activities at two weeks, then normal activities at four weeks. 4. Positioning:  Do not lie on your belly for two weeks. It is alright to lie on your side while sleeping. 5. Bathing: You may shower one day after the surgery. No tub baths or swimming for one week. Remove any gauze before showering. Skin glue will cover your incision. This can get wet in the shower, just letting the water run over it. Then pat everything dry. The skin glue usually falls off on its own, but you may remove it gently if it is still present after one week. 6. Medication:  You will receive prescriptions for a pain medication. Take the pain medication as needed according to the directions. Avoid aspirin and non-steroidal anti-inflammatories (e.g. ibuprofen) for two weeks after surgery. 7. Swelling: If you experience excessive swelling on one side, out of proportion to the other side, this could be due to bleeding under the skin (hematoma). Please call the doctor immediately should this occur.  
 
8. Numbness:  Numbness or unusual sensations of the breasts are to be expected. It can take several weeks to months for this to resolve. Occasionally there may be persistent numbness. If your breasts become increasingly painful or tender, please call the office, [phone]. Follow-up appointment: please call the office at [phone] during regular business hours to schedule an appointment in 10-14 days Introducing [facility] SERVICES! Dear Shelley Vann: Thank you for requesting a [employer] account. Our records indicate that you already have an active [employer] account. You can access your account anytime at [email or web address] Did you know that you can access your hospital and ER discharge instructions at any time in [employer]? You can also review all of your test results from your hospital stay or ER visit. Additional Information If you have questions, please visit the Frequently Asked Questions section of the [employer] website at [email or web address] Remember, [employer] is NOT to be used for urgent needs. For medical emergencies, dial 911. Now available from your iPhone and Android! Providers Seen During Your Hospitalization Provider Specialty Primary office phone Darly Hernandez MD Plastic Surgery [phone] Your Primary Care Physician (PCP) Primary Care Physician Office Phone Office Fax Darian Hess [phone] You are allergic to the following Allergen Reactions Ceclor (Cefaclor) Rash Dontae Chain Sulfa (Sulfonamide Antibiotics) Anaphylaxis Shortness of Breath Rash Invokana (Canagliflozin) Other (comments) Alopecia and yeast   
    
 Azithromycin Other (comments) Stomach issues Azithromycin Nausea Only Pt states she is allergic to all mycin drugs. Ciprofloxacin Other (comments) Headache Lexapro (Escitalopram) Other (comments)  Pt states she had arm pains and vision changes. Losartan Cough Tetracycline Other (comments) Stomach issues Tetracycline Nausea Only Zyrtec (Cetirizine) Other (comments) Pt states that it makes her heart race Recent Documentation OB Status Smoking Status Postmenopausal Never Smoker Emergency Contacts Name Discharge Info Relation Home Work Mobile JOSE M LOPEZ [facility] DISCHARGE CAREGIVER [3] Child [2] [phone] Jie Aguilar DISCHARGE CAREGIVER [3] Child [2] [phone] Patient Belongings The following personal items are in your possession at time of discharge: 
  Dental Appliances: None  Visual Aid: None      Home Medications: None   Jewelry: None  Clothing:  (street clothes to preop)    Other Valuables:  (ipod and ear plugs in patient's belongings) Please provide this summary of care documentation to your next provider. Signatures-by signing, you are acknowledging that this After Visit Summary has been reviewed with you and you have received a copy. Patient Signature:  ____________________________________________________________ Date:  ____________________________________________________________  
  
[facility] Provider Signature:  ____________________________________________________________ Date:  ____________________________________________________________

## 2017-11-21 NOTE — DISCHARGE INSTRUCTIONS
[employer]  Mukesh Ehrich. Jasmine Sherwood M.D., F.A.C.S.  [phone]    breast reduction post-operative instructions      1. Surgical Bra:  You will be in a surgical bra following the procedure, with bandages covering the skin glue on the incisions. You may remove the bandages 24 hours after your surgery. You can shower at that point. The fitted post-operative bra should be worn at all times except when showering for the first two weeks. There may be a small amount of oozing from the incisions for the first several days. This is normal.  The bra should be washed when it gets soiled. 2. Support bra:  After the first two weeks, you may wear a sports-type bra that fastens in the front and has NO UNDERWIRE. This should be worn day and night, except when showering, for one month after your date of surgery. 3. Activity:  Take it easy for the first several days. No cleaning, housework, or strenuous activity. Do not lift anything over 10 pounds, including children. You may resume non-vigorous activities at two weeks, then normal activities at four weeks. 4. Positioning:  Do not lie on your belly for two weeks. It is alright to lie on your side while sleeping. 5. Bathing: You may shower one day after the surgery. No tub baths or swimming for one week. Remove any gauze before showering. Skin glue will cover your incision. This can get wet in the shower, just letting the water run over it. Then pat everything dry. The skin glue usually falls off on its own, but you may remove it gently if it is still present after one week. 6. Medication:  You will receive prescriptions for a pain medication. Take the pain medication as needed according to the directions. Avoid aspirin and non-steroidal anti-inflammatories (e.g. ibuprofen) for two weeks after surgery. 7. Swelling:  If you experience excessive swelling on one side, out of proportion to the other side, this could be due to bleeding under the skin (hematoma). Please call the doctor immediately should this occur. 8. Numbness:  Numbness or unusual sensations of the breasts are to be expected. It can take several weeks to months for this to resolve. Occasionally there may be persistent numbness. If your breasts become increasingly painful or tender, please call the office, [phone].     Follow-up appointment: please call the office at [phone] during regular business hours to schedule an appointment in 10-14 days

## 2017-11-21 NOTE — ANESTHESIA PREPROCEDURE EVALUATION
Anesthetic History               Review of Systems / Medical History  Patient summary reviewed and nursing notes reviewed    Pulmonary                   Neuro/Psych         Psychiatric history    Comments: Chronic insomnia  Anxiety/depression Cardiovascular    Hypertension: well controlled          Hyperlipidemia    Exercise tolerance: >4 METS     GI/Hepatic/Renal                Endo/Other    Diabetes: well controlled    Obesity and arthritis     Other Findings              Physical Exam    Airway  Mallampati: II      Mouth opening: Normal     Cardiovascular    Rhythm: regular  Rate: normal         Dental  No notable dental hx       Pulmonary  Breath sounds clear to auscultation               Abdominal         Other Findings            Anesthetic Plan    ASA: 3  Anesthesia type: general          Induction: Intravenous  Anesthetic plan and risks discussed with: Patient      Informed consent obtained.

## 2017-11-29 ENCOUNTER — OFFICE VISIT (OUTPATIENT)
Dept: INTERNAL MEDICINE CLINIC | Age: 57
End: 2017-11-29

## 2017-11-29 VITALS
DIASTOLIC BLOOD PRESSURE: 84 MMHG | BODY MASS INDEX: 30.95 KG/M2 | WEIGHT: 181.3 LBS | HEIGHT: 64 IN | RESPIRATION RATE: 14 BRPM | TEMPERATURE: 98 F | HEART RATE: 72 BPM | OXYGEN SATURATION: 97 % | SYSTOLIC BLOOD PRESSURE: 124 MMHG

## 2017-11-29 DIAGNOSIS — E66.9 OBESITY WITH SERIOUS COMORBIDITY, UNSPECIFIED CLASSIFICATION, UNSPECIFIED OBESITY TYPE: ICD-10-CM

## 2017-11-29 DIAGNOSIS — R79.89 ELEVATED LIVER FUNCTION TESTS: ICD-10-CM

## 2017-11-29 DIAGNOSIS — E11.9 CONTROLLED TYPE 2 DIABETES MELLITUS WITHOUT COMPLICATION, WITHOUT LONG-TERM CURRENT USE OF INSULIN (HCC): ICD-10-CM

## 2017-11-29 DIAGNOSIS — I10 BENIGN ESSENTIAL HYPERTENSION: ICD-10-CM

## 2017-11-29 DIAGNOSIS — Z00.00 ROUTINE GENERAL MEDICAL EXAMINATION AT A HEALTH CARE FACILITY: Primary | ICD-10-CM

## 2017-11-29 LAB — HBA1C MFR BLD HPLC: 7 %

## 2017-11-29 RX ORDER — PHENTERMINE HYDROCHLORIDE 37.5 MG/1
37.5 TABLET ORAL
Qty: 30 TAB | Refills: 0 | Status: SHIPPED | OUTPATIENT
Start: 2017-11-29 | End: 2018-01-25 | Stop reason: SDUPTHER

## 2017-11-29 NOTE — PROGRESS NOTES
HPI:  Mauricio Kan is a 59y.o. year old female who is here for an annual physical:  She is a NP    Wt Readings from Last 3 Encounters:   11/29/17 181 lb 4.8 oz (82.2 kg)   11/06/17 185 lb 4 oz (84 kg)   07/21/17 188 lb (85.3 kg)     Temp Readings from Last 3 Encounters:   11/29/17 98 °F (36.7 °C) (Oral)   11/21/17 97.7 °F (36.5 °C)   11/06/17 98.2 °F (36.8 °C)     BP Readings from Last 3 Encounters:   11/29/17 124/84   11/21/17 159/66   11/06/17 149/66     Pulse Readings from Last 3 Encounters:   11/29/17 72   11/21/17 82   11/06/17 83        She reports the following history and medical concerns:      Dr. Dario Morales- post op 8 days 4 lbs off. Breast reduction. Overweight   Patient with diagnosis of obesity and height of Height: 5' 3.5\" (161.3 cm), weight of Weight: 181 lb 4.8 oz (82.2 kg) with BMI at Body mass index is 31.61 kg/(m^2). Wt Readings from Last 3 Encounters:   11/29/17 181 lb 4.8 oz (82.2 kg)   11/06/17 185 lb 4 oz (84 kg)   07/21/17 188 lb (85.3 kg)        Discussion of past diet and exercise efforts - she used to be phenteramine and wants to go on it. She can't exercise because my joints. Eat pretty well. Don't do enough exercise. Right hip finished PT. When I exercise and lose weight. Fingerstick today Hga1c (discussed its value with patient):  7.0    Had colonoscopy when she was 50    Pap smear this May 2017        Assessment and Plan        1. Routine general medical examination at a health care facility  Pt is working on diet but wants to take phenteramine. 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 She should not take it until after a month after her wounds healed. 2. Elevated liver function tests  Repeat LFT a few weeks ago show still some elevation. Check ultrasound. Hepatitis negative. -  ABD LTD; Future    3.  Controlled type 2 diabetes mellitus without complication, without long-term current use of insulin (HCC)  a1c some improvement. Stay on current dose. Key Antihyperglycemic Medications             metFORMIN ER (GLUCOPHAGE XR) 500 mg tablet  (Taking) Take 1 Tab by mouth two (2) times a day. - AMB POC HEMOGLOBIN A1C    4. Benign essential hypertension  Patient not on current medication. Aware phenteramine can cause her BP to go up. May prevent wound healing with vasoconstriction    5. Obesity with serious comorbidity, unspecified classification, unspecified obesity type  The risks and benefits of the new medication were discussed as well as possible side effects. Patient is instructed to call if any new symptoms arise that are listed in the AVS printed or available on [email or web address] or discussed:  Stroke, heart attack, dizziness  - phentermine (ADIPEX-P) 37.5 mg tablet; Take 1 Tab by mouth every morning. Max Daily Amount: 37.5 mg. Indications: WEIGHT LOSS MANAGEMENT FOR OBESE PATIENT (BMI >= 30)  Dispense: 30 Tab;  Refill: 0        Visit Vitals    /84 (BP 1 Location: Left arm, BP Patient Position: Sitting)    Pulse 72    Temp 98 °F (36.7 °C) (Oral)    Resp 14    Ht 5' 3.5\" (1.613 m)    Wt 181 lb 4.8 oz (82.2 kg)    SpO2 97%    BMI 31.61 kg/m2           Historical Data    Past Medical History:   Diagnosis Date    Anemia     resolved with menopause    Arthritis     degenerative     Chronic pain     right hip    Diabetes mellitus type 2, controlled ([address] 8/5/2015    Elevated liver enzymes     H/O bone density study 10/12    normal    H/O seasonal allergies     Hx of mammogram 9/20/12    Hypercalcemia 5/8/2017    Hypertension     resolved last in May    Nausea & vomiting     Other and unspecified hyperlipidemia 8/5/2015    Pap smear for cervical cancer screening 2012    Psychiatric disorder     anxiety no meds       Past Surgical History:   Procedure Laterality Date    HX ABDOMINOPLASTY      HX BREAST REDUCTION Bilateral 11/21/2017    BILATERAL BREAST REDUCTION performed by Omega Rito, MD at [facility] MA[facility]    HX BREAST REDUCTION  2017    HX  SECTION  1512,1304    HX CYST REMOVAL      HX CYST REMOVAL      right wrist    HX GI      colonoscopy    HX HEENT Right     Lasik    HX LIPECTOMY  2001    HX ORTHOPAEDIC  2016    back surgery L3- L5    HX OTHER SURGICAL      surgery to remove plantar wart    HX TONSILLECTOMY      HX TONSILLECTOMY  1967    MULTIPLE DELIVERY          Outpatient Encounter Prescriptions as of 2017   Medication Sig Dispense Refill    triamcinolone (NASACORT) 55 mcg nasal inhaler 2 Sprays nightly.  loratadine (CLARITIN) 10 mg tablet Take 10 mg by mouth daily.  coenzyme q10 (CO Q-10) 10 mg cap Take 100 mg by mouth daily.  ascorbic acid, vitamin C, (VITAMIN C) 500 mg tablet Take 1,000 mg by mouth daily.  zinc 50 mg tab tablet Take 50 mg by mouth daily.  potassium 99 mg tablet Take 99 mg by mouth daily.  metFORMIN ER (GLUCOPHAGE XR) 500 mg tablet Take 1 Tab by mouth two (2) times a day. 180 Tab 12    B.infantis-B.ani-B.long-B.bifi (PROBIOTIC 4X) 10-15 mg TbEC Take  by mouth daily.  ONETOUCH DELICA LANCETS 30 gauge misc       glucose blood VI test strips (ASCENSIA AUTODISC VI, ONE TOUCH ULTRA TEST VI) strip Use to check blood sugar once daily. 100 Strip 1    Blood-Glucose Meter monitoring kit Use as directed. Dx: E11.9 1 Kit 0    Lancets misc Use as directed. Dx: E11.9 100 Each 1    magnesium 250 mg Tab Take  by mouth.  melatonin 3 mg tablet Take 3 mg by mouth nightly. No facility-administered encounter medications on file as of 2017.          Allergies   Allergen Reactions    Ceclor [Cefaclor] Rash     Amilcar Trinidad    Sulfa (Sulfonamide Antibiotics) Anaphylaxis, Shortness of Breath and Rash    Invokana [Canagliflozin] Other (comments)     Alopecia and yeast     Azithromycin Other (comments)     Stomach issues    Azithromycin Nausea Only     Pt states she is allergic to all mycin drugs.  Ciprofloxacin Other (comments)     Headache    Lexapro [Escitalopram] Other (comments)     Pt states she had arm pains and vision changes. 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[address] [employer]     Social History Main Topics    Smoking status: Never Smoker    Smokeless tobacco: Never Used    Alcohol use 0.0 oz/week     0 Standard drinks or equivalent per week      Comment: very rare    Drug use: No    Sexual activity: Not Currently     Other Topics Concern    Not on file     Social History Narrative    ** Merged History Encounter **             family history includes Arthritis-osteo in her brother and sister; Cancer in her father and paternal aunt; Coronary Artery Disease in her brother; Hypertension in her mother and sister; Lung Disease in her brother; No Known Problems in her brother. Review of Systems   Constitutional: Negative for weight loss. Eyes: Negative for blurred vision. Respiratory: Negative for shortness of breath. Cardiovascular: Negative for chest pain. Gastrointestinal: Negative for abdominal pain. Genitourinary: Negative for dysuria and frequency. Skin: Negative for rash. Neurological: Negative for dizziness, focal weakness, weakness and headaches. Endo/Heme/Allergies: Negative for environmental allergies. Does not bruise/bleed easily.          Visit Vitals    /84 (BP 1 Location: Left arm, BP Patient Position: Sitting)    Pulse 72    Temp 98 °F (36.7 °C) (Oral)    Resp 14    Ht 5' 3.5\" (1.613 m)    Wt 181 lb 4.8 oz (82.2 kg)    SpO2 97%    BMI 31.61 kg/m2         Physical Exam   Constitutional: She is oriented to person, place, and time and well-developed, well-nourished, and in no distress. No distress. HENT:   Nose: Nose normal.   Mouth/Throat: Oropharynx is clear and moist.   Eyes: Conjunctivae and EOM are normal.   Neck: Normal range of motion. Neck supple. No thyromegaly present. Cardiovascular: Normal rate, regular rhythm and normal heart sounds. Exam reveals no gallop and no friction rub. Pulmonary/Chest: Effort normal and breath sounds normal. No respiratory distress. She has no wheezes. She has no rales. Abdominal: Soft. Bowel sounds are normal. She exhibits no distension. There is no tenderness. Musculoskeletal: Normal range of motion. She exhibits no edema, tenderness or deformity. Lymphadenopathy:     She has no cervical adenopathy. Neurological: She is alert and oriented to person, place, and time. Gait normal.   Skin: Skin is warm and dry. No rash noted. Area on breast wound show some swelling and mild redness. No drainage seen. Psychiatric: Affect and judgment normal.     Ortho Exam     Assessment:  See Above for discussion/plan. Annual Physical completed. I have reviewed the patient's medical history in detail and updated the computerized patient record. We had a prolonged discussion about these complex clinical issues and went over the various important aspects to consider. All questions were answered. Advised her to call back or return to office if symptoms do not improve, change in nature, or persist.    She was given an after visit summary or informed of [employer] which includes patient instructions, diagnoses, current medications, & vitals. She expressed understanding with the diagnosis and plan.

## 2017-12-01 ENCOUNTER — HOSPITAL ENCOUNTER (OUTPATIENT)
Dept: ULTRASOUND IMAGING | Age: 57
Discharge: HOME | End: 2017-12-01
Attending: INTERNAL MEDICINE
Payer: COMMERCIAL

## 2017-12-01 DIAGNOSIS — R79.89 ELEVATED LIVER FUNCTION TESTS: ICD-10-CM

## 2017-12-01 PROCEDURE — 76705 ECHO EXAM OF ABDOMEN: CPT

## 2017-12-02 NOTE — PROGRESS NOTES
Your ultrasound showed fatty liver like lesions. Losing 10% of your weight is the best treatment. It sometimes can lead to liver failure although not common. You should refrain from eating foods that have a lot of preservatives. There was also a small gall bladder polyp. Because if was less than 5 mm, I suggest follow up ultrasound in a year. You can get pain from a polyp but not usually with this size. Let me know if you have any questions by sending me a message in [employer] or call our office for more urgent matters.   Message sent to patient via [employer]:  December 1, 2017

## 2017-12-13 ENCOUNTER — APPOINTMENT (OUTPATIENT)
Dept: MAMMOGRAPHY | Age: 57
End: 2017-12-13
Attending: PLASTIC SURGERY
Payer: COMMERCIAL

## 2017-12-13 ENCOUNTER — HOSPITAL ENCOUNTER (OUTPATIENT)
Dept: INTERVENTIONAL RADIOLOGY/VASCULAR | Age: 57
Discharge: HOME | End: 2017-12-13
Attending: PLASTIC SURGERY | Admitting: RADIOLOGY
Payer: COMMERCIAL

## 2017-12-13 VITALS
OXYGEN SATURATION: 99 % | HEIGHT: 64 IN | DIASTOLIC BLOOD PRESSURE: 87 MMHG | WEIGHT: 183 LBS | RESPIRATION RATE: 20 BRPM | SYSTOLIC BLOOD PRESSURE: 143 MMHG | TEMPERATURE: 97.9 F | HEART RATE: 99 BPM | BODY MASS INDEX: 31.24 KG/M2

## 2017-12-13 DIAGNOSIS — N64.89 SEROMA OF BREAST: ICD-10-CM

## 2017-12-13 DIAGNOSIS — Z98.890 STATUS POST BREAST REDUCTION: ICD-10-CM

## 2017-12-13 PROCEDURE — 74011000250 HC RX REV CODE- 250: Performed by: PLASTIC SURGERY

## 2017-12-13 PROCEDURE — 76642 ULTRASOUND BREAST LIMITED: CPT

## 2017-12-13 PROCEDURE — 76942 ECHO GUIDE FOR BIOPSY: CPT

## 2017-12-13 RX ORDER — CEPHALEXIN 500 MG/1
500 CAPSULE ORAL 4 TIMES DAILY
COMMUNITY
End: 2018-06-27 | Stop reason: ALTCHOICE

## 2017-12-13 RX ORDER — LIDOCAINE HYDROCHLORIDE 10 MG/ML
10 INJECTION INFILTRATION; PERINEURAL
Status: COMPLETED | OUTPATIENT
Start: 2017-12-13 | End: 2017-12-13

## 2017-12-13 RX ADMIN — LIDOCAINE HYDROCHLORIDE 10 ML: 10 INJECTION, SOLUTION INFILTRATION; PERINEURAL at 15:49

## 2017-12-13 NOTE — H&P
Radiology History and Physical    Patient: Deborah Dupree 59 y.o. female     Chief Complaint: No chief complaint on file. History of Present Illness: S/P breast reduction with fluid. History:    Past Medical History:   Diagnosis Date    Anemia     resolved with menopause    Arthritis     degenerative     Chronic pain     right hip    Diabetes mellitus type 2, controlled ([address] 8/5/2015    Elevated liver enzymes     H/O bone density study 10/12    normal    H/O seasonal allergies     Hx of mammogram 9/20/12    Hypercalcemia 5/8/2017    Hypertension     resolved last in May    Nausea & vomiting     Other and unspecified hyperlipidemia 8/5/2015    Pap smear for cervical cancer screening 2012    Psychiatric disorder     anxiety no meds     Family History   Problem Relation Age of Onset    Hypertension Mother     Cancer Father      lung, ?brain    Cancer Paternal Aunt     Hypertension Sister      x 2    Arthritis-osteo Sister     Coronary Artery Disease Brother      stent    Arthritis-osteo Brother     No Known Problems Brother     Lung Disease Brother      Social History     Social History    Marital status: SINGLE     Spouse name: Single    Number of children: 2    Years of education: BA     Occupational History    NP      [employer]     Social History Main Topics    Smoking status: Never Smoker    Smokeless tobacco: Never Used    Alcohol use 0.0 oz/week     0 Standard drinks or equivalent per week      Comment: very rare    Drug use: No    Sexual activity: Not Currently     Other Topics Concern    Not on file     Social History Narrative    ** Merged History Encounter **            Allergies:    Allergies   Allergen Reactions    Ceclor [Cefaclor] Rash     Amilcar Trinidad    Sulfa (Sulfonamide Antibiotics) Anaphylaxis, Shortness of Breath and Rash    Invokana [Canagliflozin] Other (comments)     Alopecia and yeast     Azithromycin Other (comments)     Stomach issues    Azithromycin Nausea Only     Pt states she is allergic to all mycin drugs.  Ciprofloxacin Other (comments)     Headache    Lexapro [Escitalopram] Other (comments)     Pt states she had arm pains and vision changes.  Losartan Cough    Tetracycline Other (comments)     Stomach issues      Tetracycline Nausea Only    Zyrtec [Cetirizine] Other (comments)     Pt states that it makes her heart race       Current Medications:  No current facility-administered medications for this encounter. Physical Exam:  Blood pressure 143/87, pulse 99, temperature 97.9 °F (36.6 °C), resp. rate 20, height 5' 3.5\" (1.613 m), weight 83 kg (183 lb), SpO2 99 %. GENERAL: alert, cooperative, mild distress, appears stated age      Alerts:    Hospital Problems  Date Reviewed: 11/29/2017    None          Laboratory:    No results for input(s): HGB, HCT, WBC, PLT, INR, BUN, CREA, K, CRCLT, HGBEXT, HCTEXT, PLTEXT in the last 72 hours. No lab exists for component: PTT, PT, INREXT      Plan of Care/Planned Procedure:  Risks, benefits, and alternatives reviewed with patient and she agrees to proceed with the procedure.

## 2017-12-13 NOTE — PROGRESS NOTES
Patient tolerated procedure well with minimal bleeding and no bruising. 30 cc of fluid removed. Bandaid placed. She will follow up with her physician.

## 2017-12-13 NOTE — ROUTINE PROCESS
Pt. Taken to [facility] via w/c for further evaluation of right breast seroma with possible drain placement. Will give assistance for pt. teaching if drain is placed.

## 2018-01-25 ENCOUNTER — OFFICE VISIT (OUTPATIENT)
Dept: INTERNAL MEDICINE CLINIC | Age: 58
End: 2018-01-25

## 2018-01-25 VITALS
BODY MASS INDEX: 30.11 KG/M2 | TEMPERATURE: 98.1 F | HEART RATE: 95 BPM | WEIGHT: 176.4 LBS | RESPIRATION RATE: 15 BRPM | OXYGEN SATURATION: 98 % | DIASTOLIC BLOOD PRESSURE: 86 MMHG | SYSTOLIC BLOOD PRESSURE: 140 MMHG | HEIGHT: 64 IN

## 2018-01-25 DIAGNOSIS — E66.9 OBESITY WITH SERIOUS COMORBIDITY, UNSPECIFIED CLASSIFICATION, UNSPECIFIED OBESITY TYPE: Primary | ICD-10-CM

## 2018-01-25 DIAGNOSIS — E11.9 CONTROLLED TYPE 2 DIABETES MELLITUS WITHOUT COMPLICATION, WITHOUT LONG-TERM CURRENT USE OF INSULIN (HCC): ICD-10-CM

## 2018-01-25 DIAGNOSIS — I10 BENIGN ESSENTIAL HYPERTENSION: ICD-10-CM

## 2018-01-25 RX ORDER — TELMISARTAN 40 MG/1
40 TABLET ORAL DAILY
Qty: 30 TAB | Refills: 12 | Status: SHIPPED | OUTPATIENT
Start: 2018-01-25 | End: 2018-04-19 | Stop reason: SDUPTHER

## 2018-01-25 RX ORDER — PHENTERMINE HYDROCHLORIDE 37.5 MG/1
37.5 TABLET ORAL
Qty: 30 TAB | Refills: 0 | Status: SHIPPED | OUTPATIENT
Start: 2018-01-25 | End: 2018-04-19 | Stop reason: SDUPTHER

## 2018-01-25 NOTE — PROGRESS NOTES
Medication Evaluation (8 week follow up)       HPI:  Luis Mckenzie is a 59y.o. year old female who is here for a follow up visit. She was last seen by me on 11/29/2017. She reports the following:    BP still high. Phenteramine is working. Weight went down. Curbs her appetite. 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 Exercises some. Says BP is borderline and not on medication. Her last a1c is 7.0      Had seroma of breast.  Biopsy negative. Assessment and Plan        1. Obesity with serious comorbidity, unspecified classification, unspecified obesity type  Ok to refill on condition we treat her BP. But she has to do her part by getting on regular exercise program.  I told her this medicine is not a long term solution. - phentermine (ADIPEX-P) 37.5 mg tablet; Take 1 Tab by mouth every morning. Max Daily Amount: 37.5 mg. Indications: WEIGHT LOSS MANAGEMENT FOR OBESE PATIENT (BMI >= 30)  Dispense: 30 Tab; Refill: 0    2. Controlled type 2 diabetes mellitus without complication, without long-term current use of insulin (HCC)  Low glycemic foods. Get weight down more by building more lean body mass not just reducing appetite.   - HEMOGLOBIN A1C W/O EAG; Future  - LIPID PANEL; Future  - METABOLIC PANEL, COMPREHENSIVE; Future  - CBC WITH AUTOMATED DIFF; Future  - MICROALBUMIN, UR, RAND W/ MICROALBUMIN/CREA RATIO; Future  - UA/M W/RFLX CULTURE, ROUTINE; Future    3. Benign essential hypertension  The risks and benefits of the new medication were discussed as well as possible side effects. Patient is instructed to call if any new symptoms arise that are listed in the AVS printed or available on [employer] or discussed:  Dizziness, hyperkalemia. Recheck cmp in 4 weeks. - telmisartan (MICARDIS) 40 mg tablet; Take 1 Tab by mouth daily. Dispense: 30 Tab;  Refill: 12              Visit Vitals    /86 (BP 1 Location: Left arm, BP Patient Position: Sitting)    Pulse 95    Temp 98.1 °F (36.7 °C) (Oral)    Resp 15    Ht 5' 3.5\" (1.613 m)    Wt 176 lb 6.4 oz (80 kg)    SpO2 98%    BMI 30.76 kg/m2       Historical Data    Past Medical History:   Diagnosis Date    Anemia     resolved with menopause    Arthritis     degenerative     Chronic pain     right hip    Diabetes mellitus type 2, controlled ([address] 2015    Elevated liver enzymes     H/O bone density study 10/12    normal    H/O seasonal allergies     Hx of mammogram 12    Hypercalcemia 2017    Hypertension     resolved last in May    Nausea & vomiting     Other and unspecified hyperlipidemia 2015    Pap smear for cervical cancer screening     Psychiatric disorder     anxiety no meds       Past Surgical History:   Procedure Laterality Date    HX ABDOMINOPLASTY      HX BREAST REDUCTION Bilateral 2017    BILATERAL BREAST REDUCTION performed by Maira Ware MD at [address] HX BREAST REDUCTION  2017    HX  SECTION  [id number]    HX CYST REMOVAL      HX CYST REMOVAL      right wrist    HX GI      colonoscopy    HX HEENT Right     Lasik    HX LIPECTOMY  2001    HX ORTHOPAEDIC  2016    back surgery L3- L5    HX OTHER SURGICAL      surgery to remove plantar wart    HX TONSILLECTOMY      HX TONSILLECTOMY  1967    MULTIPLE DELIVERY          Outpatient Encounter Prescriptions as of 2018   Medication Sig Dispense Refill    phentermine (ADIPEX-P) 37.5 mg tablet Take 1 Tab by mouth every morning. Max Daily Amount: 37.5 mg. Indications: WEIGHT LOSS MANAGEMENT FOR OBESE PATIENT (BMI >= 30) 30 Tab 0    triamcinolone (NASACORT) 55 mcg nasal inhaler 2 Sprays nightly.  coenzyme q10 (CO Q-10) 10 mg cap Take 100 mg by mouth daily.  ascorbic acid, vitamin C, (VITAMIN C) 500 mg tablet Take 1,000 mg by mouth daily.  zinc 50 mg tab tablet Take 50 mg by mouth daily.       potassium 99 mg tablet Take 99 mg by mouth daily.  metFORMIN ER (GLUCOPHAGE XR) 500 mg tablet Take 1 Tab by mouth two (2) times a day. 180 Tab 12    B.infantis-B.ani-B.long-B.bifi (PROBIOTIC 4X) 10-15 mg TbEC Take  by mouth daily.  ONETOUCH DELICA LANCETS 30 gauge misc       glucose blood VI test strips (ASCENSIA AUTODISC VI, ONE TOUCH ULTRA TEST VI) strip Use to check blood sugar once daily. 100 Strip 1    Blood-Glucose Meter monitoring kit Use as directed. Dx: E11.9 1 Kit 0    Lancets misc Use as directed. Dx: E11.9 100 Each 1    magnesium 250 mg Tab Take  by mouth.  melatonin 3 mg tablet Take 3 mg by mouth nightly.  cephALEXin (KEFLEX) 500 mg capsule Take 500 mg by mouth four (4) times daily.  loratadine (CLARITIN) 10 mg tablet Take 10 mg by mouth daily. No facility-administered encounter medications on file as of 1/25/2018. Allergies   Allergen Reactions    Ceclor [Cefaclor] Rash     Amilcar Trinidad    Sulfa (Sulfonamide Antibiotics) Anaphylaxis, Shortness of Breath and Rash    Invokana [Canagliflozin] Other (comments)     Alopecia and yeast     Azithromycin Other (comments)     Stomach issues    Azithromycin Nausea Only     Pt states she is allergic to all mycin drugs.  Ciprofloxacin Other (comments)     Headache    Lexapro [Escitalopram] Other (comments)     Pt states she had arm pains and vision changes.    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[employer] [employer]     Social History Main Topics    Smoking status: Never Smoker    Smokeless tobacco: Never Used    Alcohol use 0.0 oz/week     0 Standard drinks or equivalent per week      Comment: very rare    Drug use: No    Sexual activity: Not Currently     Other Topics Concern    Not on file     Social History Narrative    ** Merged History Encounter **             family history includes Arthritis-osteo in her brother and sister; Cancer in her father and paternal aunt; Coronary Artery Disease in her brother; Hypertension in her mother and sister; Lung Disease in her brother; No Known Problems in her brother. Review of Systems   Constitutional: Negative for weight loss. Eyes: Negative for blurred vision. Respiratory: Negative for shortness of breath. Cardiovascular: Negative for chest pain. Gastrointestinal: Negative for abdominal pain. Genitourinary: Negative for dysuria and frequency. Skin: Negative for rash. Neurological: Negative for dizziness, focal weakness, weakness and headaches. Endo/Heme/Allergies: Negative for environmental allergies. Does not bruise/bleed easily. Physical Exam   Constitutional: She appears well-developed and well-nourished. She is active. Non-toxic appearance. She does not have a sickly appearance. She does not appear ill. No distress. Eyes: Conjunctivae are normal. Right eye exhibits no discharge. Cardiovascular: Normal rate, regular rhythm, S1 normal, S2 normal, normal heart sounds and normal pulses. Exam reveals no gallop and no friction rub. Pulmonary/Chest: Effort normal and breath sounds normal. No respiratory distress. Abdominal: Soft. Bowel sounds are normal.   Musculoskeletal: She exhibits no edema or deformity. Neurological: She is alert. Skin: Skin is warm and dry. No rash noted. No pallor. Psychiatric: She has a normal mood and affect. Her behavior is normal.   Vitals reviewed. Ortho Exam      No orders of the defined types were placed in this encounter. I have reviewed the patient's medical history in detail and updated the computerized patient record.      We had a prolonged discussion about these complex clinical issues and went over the various important aspects to consider. All questions were answered. Advised her to call back or return to office if symptoms do not improve, change in nature, or persist.    She was given an after visit summary or informed of locr Access which includes patient instructions, diagnoses, current medications, & vitals. She expressed understanding with the diagnosis and plan.

## 2018-01-25 NOTE — PROGRESS NOTES
Chief Complaint   Patient presents with    Medication Evaluation     8 week follow up     1. Have you been to the ER, urgent care clinic since your last visit? Hospitalized since your last visit? No    2. Have you seen or consulted any other health care providers outside of the [address] [place] [address] since your last visit? Include any pap smears or colon screening.  No

## 2018-01-25 NOTE — PATIENT INSTRUCTIONS
Telmisartan (By mouth)   Telmisartan ([id number])  Treats high blood pressure. This medicine is an angiotensin receptor blocker (ARB). Brand Name(s): Micardis   There may be other brand names for this medicine. When This Medicine Should Not Be Used: This medicine is not right for everyone. Do not use it if you had an allergic reaction to telmisartan, or if you are pregnant. How to Use This Medicine:   Tablet  · Take your medicine as directed. Your dose may need to be changed several times to find what works best for you. · This medicine works best when you take it at the same time every day and do not miss any doses. · Read and follow the patient instructions that come with this medicine. Talk to your doctor or pharmacist if you have any questions. · Missed dose: Take a dose as soon as you remember. If it is almost time for your next dose, wait until then and take a regular dose. Do not take extra medicine to make up for a missed dose. · Store the medicine in a closed container at room temperature, away from heat, moisture, and direct light. Do not remove a tablet from the blister pack until you are ready take your medicine. Drugs and Foods to Avoid:   Ask your doctor or pharmacist before using any other medicine, including over-the-counter medicines, vitamins, and herbal products. · Do not use this medicine together with aliskiren if you have diabetes. · Some foods and medicines can affect how telmisartan works. Tell your doctor if you are using any of the following:  ¨ Aliskiren, digoxin, lithium  ¨ Other blood pressure medicine, including ACE inhibitors (such as ramipril)  ¨ Diuretic (water pill)  ¨ NSAID pain or arthritis medicine (such as aspirin, diclofenac, ibuprofen, naproxen)  · Ask your doctor before you use any medicine, supplement, or salt substitute that contains potassium. Warnings While Using This Medicine:   · It is not safe to take this medicine during pregnancy.  It could harm an unborn baby. Tell your doctor right away if you become pregnant. · Tell your doctor if you are breastfeeding, or if you have kidney problems, liver disease, or congestive heart failure. · This medicine may cause the following problems:  ¨ Kidney problems  ¨ High levels of potassium in the blood  · This medicine could lower your blood pressure too much, especially when you first use it or if you are dehydrated. Stand or sit up slowly if you feel lightheaded or dizzy. Do not drive or do anything that could be dangerous until you know how this medicine affects you. · Do not stop using the medicine without asking your doctor, even if you feel well. This medicine will not cure your high blood pressure, but it will help keep it in the normal range. You may have to take blood pressure medicine for the rest of your life. · Your doctor will do lab tests at regular visits to check on the effects of this medicine. Keep all appointments. · Keep all medicine out of the reach of children. Never share your medicine with anyone. Possible Side Effects While Using This Medicine:   Call your doctor right away if you notice any of these side effects:  · Allergic reaction: Itching or hives, swelling in your face or hands, swelling or tingling in your mouth or throat, chest tightness, trouble breathing  · Change in how much or how often you urinate, painful urination  · Confusion, weakness, uneven heartbeat, trouble breathing, numbness in your hands, feet, or lips  · Lightheadedness, dizziness, or fainting  · Rapid weight gain, swelling in your hands, ankles, or feet  If you notice other side effects that you think are caused by this medicine, tell your doctor. Call your doctor for medical advice about side effects. You may report side effects to FDA at 4-309-FDA-8677  © 2017 [facility] Information is for End User's use only and may not be sold, redistributed or otherwise used for commercial purposes.   The above information is an  only. It is not intended as medical advice for individual conditions or treatments. Talk to your doctor, nurse or pharmacist before following any medical regimen to see if it is safe and effective for you.

## 2018-02-27 LAB
ALBUMIN SERPL-MCNC: 4.4 G/DL (ref 3.5–5.5)
ALBUMIN/CREAT UR: 7.7 MG/G CREAT (ref 0–30)
ALBUMIN/GLOB SERPL: 1.7 {RATIO} (ref 1.2–2.2)
ALP SERPL-CCNC: 71 IU/L (ref 39–117)
ALT SERPL-CCNC: 38 IU/L (ref 0–32)
APPEARANCE UR: CLEAR
AST SERPL-CCNC: 25 IU/L (ref 0–40)
BACTERIA #/AREA URNS HPF: NORMAL /[HPF]
BASOPHILS # BLD AUTO: 0 X10E3/UL (ref 0–0.2)
BASOPHILS NFR BLD AUTO: 0 %
BILIRUB SERPL-MCNC: 0.4 MG/DL (ref 0–1.2)
BILIRUB UR QL STRIP: NEGATIVE
BUN SERPL-MCNC: 15 MG/DL (ref 6–24)
BUN/CREAT SERPL: 23 (ref 9–23)
CALCIUM SERPL-MCNC: 10.1 MG/DL (ref 8.7–10.2)
CASTS URNS QL MICRO: NORMAL /LPF
CHLORIDE SERPL-SCNC: 101 MMOL/L (ref 96–106)
CHOLEST SERPL-MCNC: 221 MG/DL (ref 100–199)
CO2 SERPL-SCNC: 27 MMOL/L (ref 18–29)
COLOR UR: YELLOW
CREAT SERPL-MCNC: 0.66 MG/DL (ref 0.57–1)
CREAT UR-MCNC: 126 MG/DL
EOSINOPHIL # BLD AUTO: 0.3 X10E3/UL (ref 0–0.4)
EOSINOPHIL NFR BLD AUTO: 4 %
EPI CELLS #/AREA URNS HPF: NORMAL /HPF
ERYTHROCYTE [DISTWIDTH] IN BLOOD BY AUTOMATED COUNT: 16.3 % (ref 12.3–15.4)
GFR SERPLBLD CREATININE-BSD FMLA CKD-EPI: 112 ML/MIN/1.73
GFR SERPLBLD CREATININE-BSD FMLA CKD-EPI: 97 ML/MIN/1.73
GLOBULIN SER CALC-MCNC: 2.6 G/DL (ref 1.5–4.5)
GLUCOSE SERPL-MCNC: 122 MG/DL (ref 65–99)
GLUCOSE UR QL: NEGATIVE
HBA1C MFR BLD: 6.8 % (ref 4.8–5.6)
HCT VFR BLD AUTO: 41.9 % (ref 34–46.6)
HDLC SERPL-MCNC: 51 MG/DL
HGB BLD-MCNC: 13.3 G/DL (ref 11.1–15.9)
HGB UR QL STRIP: NEGATIVE
IMM GRANULOCYTES # BLD: 0 X10E3/UL (ref 0–0.1)
IMM GRANULOCYTES NFR BLD: 0 %
INTERPRETATION, 910389: NORMAL
KETONES UR QL STRIP: NEGATIVE
LDLC SERPL CALC-MCNC: 157 MG/DL (ref 0–99)
LEUKOCYTE ESTERASE UR QL STRIP: NEGATIVE
LYMPHOCYTES # BLD AUTO: 2.8 X10E3/UL (ref 0.7–3.1)
LYMPHOCYTES NFR BLD AUTO: 47 %
Lab: NORMAL
MCH RBC QN AUTO: 24.7 PG (ref 26.6–33)
MCHC RBC AUTO-ENTMCNC: 31.7 G/DL (ref 31.5–35.7)
MCV RBC AUTO: 78 FL (ref 79–97)
MICRO URNS: NORMAL
MICRO URNS: NORMAL
MICROALBUMIN UR-MCNC: 9.7 UG/ML
MONOCYTES # BLD AUTO: 0.4 X10E3/UL (ref 0.1–0.9)
MONOCYTES NFR BLD AUTO: 7 %
MUCOUS THREADS URNS QL MICRO: PRESENT
NEUTROPHILS # BLD AUTO: 2.6 X10E3/UL (ref 1.4–7)
NEUTROPHILS NFR BLD AUTO: 42 %
NITRITE UR QL STRIP: NEGATIVE
PH UR STRIP: 6.5 [PH] (ref 5–7.5)
PLATELET # BLD AUTO: 301 X10E3/UL (ref 150–379)
POTASSIUM SERPL-SCNC: 4.6 MMOL/L (ref 3.5–5.2)
PROT SERPL-MCNC: 7 G/DL (ref 6–8.5)
PROT UR QL STRIP: NEGATIVE
RBC # BLD AUTO: 5.38 X10E6/UL (ref 3.77–5.28)
RBC #/AREA URNS HPF: NORMAL /HPF
SODIUM SERPL-SCNC: 142 MMOL/L (ref 134–144)
SP GR UR: 1.02 (ref 1–1.03)
TRIGL SERPL-MCNC: 64 MG/DL (ref 0–149)
URINALYSIS REFLEX, 377202: NORMAL
UROBILINOGEN UR STRIP-MCNC: 0.2 MG/DL (ref 0.2–1)
VLDLC SERPL CALC-MCNC: 13 MG/DL (ref 5–40)
WBC # BLD AUTO: 6.1 X10E3/UL (ref 3.4–10.8)
WBC #/AREA URNS HPF: NORMAL /HPF

## 2018-04-19 ENCOUNTER — OFFICE VISIT (OUTPATIENT)
Dept: INTERNAL MEDICINE CLINIC | Age: 58
End: 2018-04-19

## 2018-04-19 VITALS
SYSTOLIC BLOOD PRESSURE: 142 MMHG | RESPIRATION RATE: 15 BRPM | HEART RATE: 92 BPM | DIASTOLIC BLOOD PRESSURE: 86 MMHG | OXYGEN SATURATION: 98 % | TEMPERATURE: 97.9 F | BODY MASS INDEX: 29.82 KG/M2 | HEIGHT: 64 IN | WEIGHT: 174.7 LBS

## 2018-04-19 DIAGNOSIS — I10 BENIGN ESSENTIAL HYPERTENSION: ICD-10-CM

## 2018-04-19 DIAGNOSIS — E66.9 OBESITY WITH SERIOUS COMORBIDITY, UNSPECIFIED CLASSIFICATION, UNSPECIFIED OBESITY TYPE: ICD-10-CM

## 2018-04-19 DIAGNOSIS — E55.9 VITAMIN D DEFICIENCY: ICD-10-CM

## 2018-04-19 DIAGNOSIS — E78.5 HYPERLIPIDEMIA WITH TARGET LDL LESS THAN 100: ICD-10-CM

## 2018-04-19 DIAGNOSIS — E11.9 CONTROLLED TYPE 2 DIABETES MELLITUS WITHOUT COMPLICATION, WITHOUT LONG-TERM CURRENT USE OF INSULIN (HCC): Primary | ICD-10-CM

## 2018-04-19 DIAGNOSIS — K59.00 CONSTIPATION, UNSPECIFIED CONSTIPATION TYPE: ICD-10-CM

## 2018-04-19 RX ORDER — TELMISARTAN 20 MG/1
20 TABLET ORAL DAILY
Qty: 90 TAB | Refills: 3 | Status: SHIPPED | OUTPATIENT
Start: 2018-04-19 | End: 2018-06-27 | Stop reason: SINTOL

## 2018-04-19 RX ORDER — PHENTERMINE HYDROCHLORIDE 37.5 MG/1
37.5 TABLET ORAL
Qty: 30 TAB | Refills: 0 | Status: SHIPPED | OUTPATIENT
Start: 2018-04-19 | End: 2018-07-17 | Stop reason: SDUPTHER

## 2018-04-19 NOTE — PROGRESS NOTES
Chief Complaint   Patient presents with    Medication Evaluation     1. Have you been to the ER, urgent care clinic since your last visit? Hospitalized since your last visit? No    2. Have you seen or consulted any other health care providers outside of the [address] [place] [address] since your last visit? Include any pap smears or colon screening.  No

## 2018-04-19 NOTE — PROGRESS NOTES
Medication Evaluation       HPI:  Linwood Garcia is a 59y.o. year old female who is here for a follow up visit. She was last seen by me on 2/26/2018. She reports the following:    Lab Results   Component Value Date/Time    Hemoglobin A1c 6.8 (H) 02/26/2018 09:00 AM    Hemoglobin A1c (POC) 7.0 11/29/2017 11:14 AM       Wt Readings from Last 3 Encounters:   04/19/18 174 lb 11.2 oz (79.2 kg)   01/25/18 176 lb 6.4 oz (80 kg)   12/13/17 183 lb (83 kg)      No palpitations, appetite is down. Tolerating phenteramine. Has not been checking blood sugar. Hasn't been taking 40 mg micardis daily.  and that is too high. She doesn't want to go on statin. Refused zetia. Takes baby aspirin. Will try RYR        Assessment and Plan        1. Benign essential hypertension  Restart at 20 mg of micardis. 40 mg made her feel tired. Should be on ARB because of DM  - telmisartan (MICARDIS) 20 mg tablet; Take 1 Tab by mouth daily. Dispense: 90 Tab; Refill: 3    2. Controlled type 2 diabetes mellitus without complication, without long-term current use of insulin ([address]  Recheck a1c next visit  - HEMOGLOBIN A1C WITH EAG; Future    3. Hyperlipidemia with target LDL less than 100  Refused statin. RYR- she is aware it is a statin or works like one.  - LIPID PANEL; Future  - METABOLIC PANEL, COMPREHENSIVE; Future  - CBC WITH AUTOMATED DIFF; Future  - TSH 3RD GENERATION; Future    4. Vitamin D deficiency  Patient compliant with Vit D. Emphasized importance of taking with food and not too much because it can be toxic to our body. Therefore, it should be checked regularly. Levels ordered. - VITAMIN D, 25 HYDROXY; Future    5.  Constipation, unspecified constipation type  Increase magnesium -2 pills once a day              Visit Vitals    /86 (BP 1 Location: Left arm, BP Patient Position: Sitting)    Pulse 92    Temp 97.9 °F (36.6 °C) (Oral)    Resp 15    Ht 5' 3.5\" (1.613 m)    Wt 174 lb 11.2 oz (79.2 kg)    SpO2 98%    BMI 30.46 kg/m2       Historical Data    Past Medical History:   Diagnosis Date    Anemia     resolved with menopause    Arthritis     degenerative     Chronic pain     right hip    Diabetes mellitus type 2, controlled ([address] 2015    Elevated liver enzymes     H/O bone density study 10/12    normal    H/O seasonal allergies     Hx of mammogram 12    Hypercalcemia 2017    Hypertension     resolved last in May    Nausea & vomiting     Other and unspecified hyperlipidemia 2015    Pap smear for cervical cancer screening     Psychiatric disorder     anxiety no meds       Past Surgical History:   Procedure Laterality Date    HX ABDOMINOPLASTY      HX BREAST REDUCTION Bilateral 2017    BILATERAL BREAST REDUCTION performed by Deneen White MD at [address] HX BREAST REDUCTION  2017    HX  SECTION  [id number]    HX CYST REMOVAL      HX CYST REMOVAL      right wrist    HX GI      colonoscopy    HX HEENT Right     Lasik    HX LIPECTOMY  2001    HX ORTHOPAEDIC  2016    back surgery L3- L5    HX OTHER SURGICAL      surgery to remove plantar wart    HX TONSILLECTOMY      HX TONSILLECTOMY  1967    MULTIPLE DELIVERY          Outpatient Encounter Prescriptions as of 2018   Medication Sig Dispense Refill    telmisartan (MICARDIS) 20 mg tablet Take 1 Tab by mouth daily. 90 Tab 3    phentermine (ADIPEX-P) 37.5 mg tablet Take 1 Tab by mouth every morning. Max Daily Amount: 37.5 mg. Indications: WEIGHT LOSS MANAGEMENT FOR OBESE PATIENT (BMI >= 30) 30 Tab 0    triamcinolone (NASACORT) 55 mcg nasal inhaler 2 Sprays nightly.  loratadine (CLARITIN) 10 mg tablet Take 10 mg by mouth daily.  coenzyme q10 (CO Q-10) 10 mg cap Take 100 mg by mouth daily.  ascorbic acid, vitamin C, (VITAMIN C) 500 mg tablet Take 1,000 mg by mouth daily.  zinc 50 mg tab tablet Take 50 mg by mouth daily.       potassium 99 mg tablet Take 99 mg by mouth daily.  metFORMIN ER (GLUCOPHAGE XR) 500 mg tablet Take 1 Tab by mouth two (2) times a day. 180 Tab 12    B.infantis-B.ani-B.long-B.bifi (PROBIOTIC 4X) 10-15 mg TbEC Take  by mouth daily.  ONETOUCH DELICA LANCETS 30 gauge misc       glucose blood VI test strips (ASCENSIA AUTODISC VI, ONE TOUCH ULTRA TEST VI) strip Use to check blood sugar once daily. 100 Strip 1    Blood-Glucose Meter monitoring kit Use as directed. Dx: E11.9 1 Kit 0    Lancets misc Use as directed. Dx: E11.9 100 Each 1    magnesium 250 mg Tab Take  by mouth.  melatonin 3 mg tablet Take 3 mg by mouth nightly.  [DISCONTINUED] telmisartan (MICARDIS) 40 mg tablet Take 1 Tab by mouth daily. 30 Tab 12    cephALEXin (KEFLEX) 500 mg capsule Take 500 mg by mouth four (4) times daily. No facility-administered encounter medications on file as of 4/19/2018. Allergies   Allergen Reactions    Ceclor [Cefaclor] Rash     Amilcar Trinidad    Sulfa (Sulfonamide Antibiotics) Anaphylaxis, Shortness of Breath and Rash    Invokana [Canagliflozin] Other (comments)     Alopecia and yeast     Azithromycin Other (comments)     Stomach issues    Azithromycin Nausea Only     Pt states she is allergic to all mycin drugs.  Ciprofloxacin Other (comments)     Headache    Lexapro [Escitalopram] Other (comments)     Pt states she had arm pains and vision changes.     Losartan Cough    Tetracycline Other (comments)     Stomach issues      Tetracycline Nausea Only    Zyrtec [Cetirizine] Other (comments)     Pt states that it makes her heart race        Social History     Social History    Marital status:      Spouse name: Single    Number of children: 2    Years of education: BA     Occupational History    NP      [address]     Social History Main Topics    Smoking status: Never Smoker    Smokeless tobacco: Never Used    Alcohol use 0.0 oz/week     0 Standard drinks or equivalent per week      Comment: very rare    Drug use: No    Sexual activity: Not Currently     Other Topics Concern    Not on file     Social History Narrative    ** Merged History Encounter **             family history includes Arthritis-osteo in her brother and sister; Cancer in her father and paternal aunt; Coronary Artery Disease in her brother; Hypertension in her mother and sister; Lung Disease in her brother; No Known Problems in her brother. Review of Systems   Constitutional: Negative for weight loss. Eyes: Negative for blurred vision. Respiratory: Negative for shortness of breath. Cardiovascular: Negative for chest pain. Gastrointestinal: Negative for abdominal pain. Genitourinary: Negative for dysuria and frequency. Skin: Negative for rash. Neurological: Negative for dizziness, focal weakness, weakness and headaches. Endo/Heme/Allergies: Negative for environmental allergies. Does not bruise/bleed easily. Physical Exam   Constitutional: She is oriented to person, place, and time. She appears well-developed and well-nourished. She is active. Non-toxic appearance. She does not have a sickly appearance. She does not appear ill. No distress. Eyes: Conjunctivae are normal. Right eye exhibits no discharge. Cardiovascular: Normal rate, regular rhythm, S1 normal, S2 normal, normal heart sounds and normal pulses. Exam reveals no gallop and no friction rub. Pulmonary/Chest: Effort normal and breath sounds normal. No respiratory distress. Abdominal: Soft. Bowel sounds are normal.   Musculoskeletal: She exhibits no edema or deformity. Neurological: She is alert and oriented to person, place, and time. Skin: Skin is warm and dry. No rash noted. No pallor. Psychiatric: She has a normal mood and affect. Her behavior is normal.   Vitals reviewed.      Ortho Exam      Orders Placed This Encounter    LIPID PANEL     Standing Status:   Future     Standing Expiration Date:   70/89/3752    METABOLIC PANEL, COMPREHENSIVE     Standing Status:   Future     Standing Expiration Date:   10/16/2018    CBC WITH AUTOMATED DIFF     Standing Status:   Future     Standing Expiration Date:   10/16/2018    HEMOGLOBIN A1C WITH EAG     Standing Status:   Future     Standing Expiration Date:   10/16/2018    VITAMIN D, 25 HYDROXY     Standing Status:   Future     Standing Expiration Date:   10/16/2018    TSH 3RD GENERATION     Standing Status:   Future     Standing Expiration Date:   10/16/2018    telmisartan (MICARDIS) 20 mg tablet     Sig: Take 1 Tab by mouth daily. Dispense:  90 Tab     Refill:  3     Tabs or caps        I have reviewed the patient's medical history in detail and updated the computerized patient record. We had a prolonged discussion about these complex clinical issues and went over the various important aspects to consider. All questions were answered. Advised her to call back or return to office if symptoms do not improve, change in nature, or persist.    She was given an after visit summary or informed of [employer] which includes patient instructions, diagnoses, current medications, & vitals. She expressed understanding with the diagnosis and plan.

## 2018-06-27 ENCOUNTER — OFFICE VISIT (OUTPATIENT)
Dept: INTERNAL MEDICINE CLINIC | Age: 58
End: 2018-06-27

## 2018-06-27 VITALS
RESPIRATION RATE: 16 BRPM | DIASTOLIC BLOOD PRESSURE: 76 MMHG | TEMPERATURE: 97.2 F | HEIGHT: 64 IN | SYSTOLIC BLOOD PRESSURE: 114 MMHG | HEART RATE: 76 BPM | BODY MASS INDEX: 29.91 KG/M2 | WEIGHT: 175.2 LBS | OXYGEN SATURATION: 99 %

## 2018-06-27 DIAGNOSIS — H92.01 EAR PAIN, RIGHT: ICD-10-CM

## 2018-06-27 DIAGNOSIS — I10 BENIGN ESSENTIAL HYPERTENSION: ICD-10-CM

## 2018-06-27 DIAGNOSIS — M79.2 NEURALGIA INVOLVING SCALP: Primary | ICD-10-CM

## 2018-06-27 RX ORDER — METHYLPREDNISOLONE 4 MG/1
TABLET ORAL
Qty: 1 DOSE PACK | Refills: 0 | Status: SHIPPED | OUTPATIENT
Start: 2018-06-27 | End: 2018-09-28

## 2018-06-27 RX ORDER — HYDROCHLOROTHIAZIDE 25 MG/1
25 TABLET ORAL DAILY
Qty: 90 TAB | Refills: 0 | Status: SHIPPED | OUTPATIENT
Start: 2018-06-27 | End: 2018-08-06 | Stop reason: SDUPTHER

## 2018-06-27 NOTE — MR AVS SNAPSHOT
[address] [facility], [address] [place] 
[phone] Patient: Jay Beckford MRN: [id number] Visit Information Date & Time Provider Department Dept. Phone Encounter #  
 6/27/2018  8:40 AM [facility]  Your Appointments 7/24/2018  8:45 AM  
ROUTINE CARE with MD Sa[facility] Appt Note: 3m f/u  
 [address] , [address] [place]  
[address], [address] [facility] [id number] Upcoming Health Maintenance Date Due Pneumococcal 19-64 Medium Risk (1 of 1 - PPSV23) [birth year]2/1979 ZOSTER VACCINE AGE 60> 3/2/2018 FOOT EXAM Q1 3/3/2018 Influenza Age 5 to Adult 8/1/2018 HEMOGLOBIN A1C Q6M 8/26/2018 EYE EXAM RETINAL OR DILATED Q1 9/7/2018 DTaP/Tdap/Td series (2 - Td) 1/1/2019 MICROALBUMIN Q1 2/26/2019 LIPID PANEL Q1 2/26/2019 BREAST CANCER SCRN MAMMOGRAM 5/9/2019 PAP AKA CERVICAL CYTOLOGY 5/16/2020 COLONOSCOPY 11/1/2021 Allergies as of 6/27/2018  Review Complete On: 6/27/2018 By: Alyssa Miller NP Severity Noted Reaction Type Reactions Ceclor [Cefaclor] High 07/06/2012    Rash Wisam Casey Sulfa (Sulfonamide Antibiotics) High 07/06/2012   Systemic Anaphylaxis, Shortness of Breath, Rash Invokana [Canagliflozin] Medium 07/21/2017   Topical Other (comments) Alopecia and yeast   
 Micardis [Telmisartan] Medium 06/27/2018   Side Effect Myalgia Leg muscle pain - subsided with discontinuation Azithromycin  07/06/2012    Nausea Only Pt states she is allergic to all mycin drugs; GI upset Ciprofloxacin  07/06/2012    Other (comments) Headache Lexapro [Escitalopram]  07/06/2012    Other (comments) Pt states she had arm pains and vision changes. Losartan  07/13/2015    Cough Tetracycline  06/21/2012    Other (comments) Stomach issues Tetracycline  07/06/2012    Nausea Only Zyrtec [Cetirizine]  07/14/2014   Systemic Other (comments) Pt states that it makes her heart race Current Immunizations  Reviewed on 3/3/2017 Name Date Influenza Vaccine 11/1/2016, 10/8/2014, 10/1/2013 TB Skin Test (PPD) Intradermal 9/25/2013 TDAP Vaccine 1/1/2009 Not reviewed this visit You Were Diagnosed With   
  
 Codes Comments Neuralgia involving scalp    -  Primary ICD-10-CM: M79.2 ICD-9-CM: 729.2 Ear pain, right     ICD-10-CM: H92.01 
ICD-9-CM: 388.70 Benign essential hypertension     ICD-10-CM: I10 
ICD-9-CM: 401.1 Vitals BP Pulse Temp Resp Height(growth percentile) Weight(growth percentile) 114/76 76 97.2 °F (36.2 °C) (Oral) 16 5' 3.5\" (1.613 m) 175 lb 3.2 oz (79.5 kg) SpO2 BMI OB Status Smoking Status 99% 30.55 kg/m2 Postmenopausal Never Smoker Vitals History BMI and BSA Data Body Mass Index Body Surface Area 30.55 kg/m 2 1.89 m 2 Preferred Pharmacy Pharmacy Name Phone [facility] [address], [address] [place] [address] [phone] Your Updated Medication List  
  
   
This list is accurate as of 6/27/18  9:45 AM.  Always use your most recent med list.  
  
  
  
  
 ascorbic acid (vitamin C) 500 mg tablet Commonly known as:  VITAMIN C Take 1,000 mg by mouth daily. Blood-Glucose Meter monitoring kit Use as directed. Dx: E11.9 CLARITIN 10 mg tablet Generic drug:  loratadine Take 10 mg by mouth daily. CO Q-10 10 mg Cap Generic drug:  coenzyme q10 Take 100 mg by mouth daily. glucose blood VI test strips strip Commonly known as:  ASCENSIA AUTODISC VI, ONE TOUCH ULTRA TEST VI  
Use to check blood sugar once daily. hydroCHLOROthiazide 25 mg tablet Commonly known as:  HYDRODIURIL Take 1 Tab by mouth daily. * Lancets Misc Use as directed. Dx: E11.9  
  
 * ONETOUCH DELICA LANCETS 30 gauge Misc Generic drug:  lancets  
  
 magnesium 250 mg Tab Take  by mouth.  
  
 melatonin 3 mg tablet Take 3 mg by mouth nightly. metFORMIN  mg tablet Commonly known as:  GLUCOPHAGE XR Take 1 Tab by mouth two (2) times a day. methylPREDNISolone 4 mg tablet Commonly known as:  [address] As directed NASACORT 55 mcg nasal inhaler Generic drug:  triamcinolone 2 Sprays nightly. phentermine 37.5 mg tablet Commonly known as:  ADIPEX-P Take 1 Tab by mouth every morning. Max Daily Amount: 37.5 mg. Indications: WEIGHT LOSS MANAGEMENT FOR OBESE PATIENT (BMI >= 30) PROBIOTIC 4X 10-15 mg Tbec Generic drug:  B.infantis-B.ani-B.long-B.bifi Take  by mouth daily. zinc 50 mg Tab tablet Take 50 mg by mouth daily. * Notice: This list has 2 medication(s) that are the same as other medications prescribed for you. Read the directions carefully, and ask your doctor or other care provider to review them with you. Prescriptions Sent to Pharmacy Refills  
 hydroCHLOROthiazide (HYDRODIURIL) 25 mg tablet 0 Sig: Take 1 Tab by mouth daily. Class: Normal  
 Pharmacy: [facility] [address], [address] Ph #: [phone] Route: Oral  
 methylPREDNISolone (MEDROL DOSEPACK) 4 mg tablet 0 Sig: As directed Class: Normal  
 Pharmacy: [facility] [address], [address] [place] [address] Ph #: [phone] Patient Instructions While on the Medrol dospak, do not take Ibuprofen, but Tylenol is OK Introducing [facility]! Dear Farida Kumar: Thank you for requesting a [employer] account. Our records indicate that you already have an active [employer] account. You can access your account anytime at [email or web address] Did you know that you can access your hospital and ER discharge instructions at any time in [employer]?   You can also review all of your test results from your hospital stay or ER visit. Additional Information If you have questions, please visit the Frequently Asked Questions section of the [employer] website at [email or web address] Remember, [employer] is NOT to be used for urgent needs. For medical emergencies, dial 911. Now available from your iPhone and Android! Please provide this summary of care documentation to your next provider. Your primary care clinician is listed as Елена Perez. If you have any questions after today's visit, please call [phone].

## 2018-06-27 NOTE — PROGRESS NOTES
60 yo female with R side head, ear and jaw pain for a week. Pain comes in waves. She presented to [address] [place] [address]  on 6/22, was dx w/ otitis media and placed on Augmentin for 10 days, but no improvement in discomfort has occurred. Over the last 5 days, she has also been taking Ibuprofen 600 mg every 6 hrs and Tylenol prn. She saw her Ortho back surgeon, thinking the head discomfort may be related to her back, but he didn't think this head pain is related. Next she looked up the Telemisartan/Micardis which she has been on since Feb, and many of her sxs are listed as possible side effects of that medication. She stopped the Micardis in early June and switched back to HCTZ 25 mg daily which she had been on previously, has been checking her BPs at home with satisfactory results, and the muscle discomfort in LEs has improved. Her last dental visit was for cleaning around May 15th. She has noted a feeling that her bite is changing with chewing since April. PE: Overweight BF   T - 97.2   Repeat BP - 114/76   Facial asymmetry is preseved   NORA, Fundoscopic - benign   Neck - full ROM; no palpable nodes   Ears - no tenderness, canals - clear, TMs - slightly dull and R w/ minimal injection   Phar/Oral mucosa/gums - no redness or abnormality noted   TMJs - palpable shift bilat, but no tenderness   Scalp - slightly tender to pressure over LEFT occipital tendon   Upper back - no palpable spasm of upper trazezius muscles    Imp: R scalp pain   R ear and face pain   HTN - med change   Intolerance of Micardis    Plan: Medrol dospak   Stop NSAID   Refill HCTZ   See Dr. Fredia Libman as scheduled next month  ___________________________  Expected course of current diagnosed problem(s) as well as expected progression and possible complications, and desired follow up with provider are discussed with patient. Patient is encouraged to be back in touch with any questions or concerns.     Patient expresses understanding of plan of care.    Patient is given AVS which includes diagnoses, current medications, vitals.

## 2018-06-27 NOTE — PROGRESS NOTES
Chief Complaint   Patient presents with    Ear Pain     right    Headache     x1 week     Jaw Pain     x1 week     1. Have you been to the ER, urgent care clinic since your last visit? Hospitalized since your last visit? Yes When: 6-22-18 Otitis Media, Amoxacillin 875, no better     2. Have you seen or consulted any other health care providers outside of the [facility] since your last visit? Include any pap smears or colon screening.  No

## 2018-07-17 DIAGNOSIS — E66.9 OBESITY WITH SERIOUS COMORBIDITY, UNSPECIFIED CLASSIFICATION, UNSPECIFIED OBESITY TYPE: ICD-10-CM

## 2018-07-17 RX ORDER — PHENTERMINE HYDROCHLORIDE 37.5 MG/1
37.5 TABLET ORAL
Qty: 30 TAB | Refills: 0 | Status: SHIPPED | OUTPATIENT
Start: 2018-07-17 | End: 2018-09-28

## 2018-07-19 LAB
25(OH)D3+25(OH)D2 SERPL-MCNC: 65.6 NG/ML (ref 30–100)
ALBUMIN SERPL-MCNC: 4.5 G/DL (ref 3.6–4.8)
ALBUMIN/GLOB SERPL: 1.8 {RATIO} (ref 1.2–2.2)
ALP SERPL-CCNC: 69 IU/L (ref 39–117)
ALT SERPL-CCNC: 37 IU/L (ref 0–32)
AST SERPL-CCNC: 32 IU/L (ref 0–40)
BASOPHILS # BLD AUTO: 0 X10E3/UL (ref 0–0.2)
BASOPHILS NFR BLD AUTO: 0 %
BILIRUB SERPL-MCNC: 0.4 MG/DL (ref 0–1.2)
BUN SERPL-MCNC: 14 MG/DL (ref 8–27)
BUN/CREAT SERPL: 19 (ref 12–28)
CALCIUM SERPL-MCNC: 10.1 MG/DL (ref 8.7–10.3)
CHLORIDE SERPL-SCNC: 98 MMOL/L (ref 96–106)
CHOLEST SERPL-MCNC: 198 MG/DL (ref 100–199)
CO2 SERPL-SCNC: 25 MMOL/L (ref 20–29)
CREAT SERPL-MCNC: 0.75 MG/DL (ref 0.57–1)
EOSINOPHIL # BLD AUTO: 0.3 X10E3/UL (ref 0–0.4)
EOSINOPHIL NFR BLD AUTO: 6 %
ERYTHROCYTE [DISTWIDTH] IN BLOOD BY AUTOMATED COUNT: 15.1 % (ref 12.3–15.4)
EST. AVERAGE GLUCOSE BLD GHB EST-MCNC: 151 MG/DL
GLOBULIN SER CALC-MCNC: 2.5 G/DL (ref 1.5–4.5)
GLUCOSE SERPL-MCNC: 128 MG/DL (ref 65–99)
HBA1C MFR BLD: 6.9 % (ref 4.8–5.6)
HCT VFR BLD AUTO: 39 % (ref 34–46.6)
HDLC SERPL-MCNC: 50 MG/DL
HGB BLD-MCNC: 13.1 G/DL (ref 11.1–15.9)
IMM GRANULOCYTES # BLD: 0 X10E3/UL (ref 0–0.1)
IMM GRANULOCYTES NFR BLD: 0 %
INTERPRETATION, 910389: NORMAL
LDLC SERPL CALC-MCNC: 129 MG/DL (ref 0–99)
LYMPHOCYTES # BLD AUTO: 2.2 X10E3/UL (ref 0.7–3.1)
LYMPHOCYTES NFR BLD AUTO: 45 %
Lab: NORMAL
MCH RBC QN AUTO: 25.5 PG (ref 26.6–33)
MCHC RBC AUTO-ENTMCNC: 33.6 G/DL (ref 31.5–35.7)
MCV RBC AUTO: 76 FL (ref 79–97)
MONOCYTES # BLD AUTO: 0.4 X10E3/UL (ref 0.1–0.9)
MONOCYTES NFR BLD AUTO: 7 %
NEUTROPHILS # BLD AUTO: 2.1 X10E3/UL (ref 1.4–7)
NEUTROPHILS NFR BLD AUTO: 42 %
PLATELET # BLD AUTO: 262 X10E3/UL (ref 150–379)
POTASSIUM SERPL-SCNC: 4 MMOL/L (ref 3.5–5.2)
PROT SERPL-MCNC: 7 G/DL (ref 6–8.5)
RBC # BLD AUTO: 5.13 X10E6/UL (ref 3.77–5.28)
SODIUM SERPL-SCNC: 142 MMOL/L (ref 134–144)
TRIGL SERPL-MCNC: 95 MG/DL (ref 0–149)
TSH SERPL DL<=0.005 MIU/L-ACNC: 1.7 UIU/ML (ref 0.45–4.5)
VLDLC SERPL CALC-MCNC: 19 MG/DL (ref 5–40)
WBC # BLD AUTO: 5.1 X10E3/UL (ref 3.4–10.8)

## 2018-08-06 ENCOUNTER — OFFICE VISIT (OUTPATIENT)
Dept: INTERNAL MEDICINE CLINIC | Age: 58
End: 2018-08-06

## 2018-08-06 VITALS
HEIGHT: 64 IN | SYSTOLIC BLOOD PRESSURE: 132 MMHG | DIASTOLIC BLOOD PRESSURE: 80 MMHG | RESPIRATION RATE: 18 BRPM | BODY MASS INDEX: 30 KG/M2 | OXYGEN SATURATION: 97 % | TEMPERATURE: 97.9 F | WEIGHT: 175.7 LBS | HEART RATE: 91 BPM

## 2018-08-06 DIAGNOSIS — E11.9 CONTROLLED TYPE 2 DIABETES MELLITUS WITHOUT COMPLICATION, WITHOUT LONG-TERM CURRENT USE OF INSULIN (HCC): Primary | ICD-10-CM

## 2018-08-06 DIAGNOSIS — I10 BENIGN ESSENTIAL HYPERTENSION: ICD-10-CM

## 2018-08-06 DIAGNOSIS — E83.52 HYPERCALCEMIA: ICD-10-CM

## 2018-08-06 RX ORDER — HYDROCHLOROTHIAZIDE 25 MG/1
25 TABLET ORAL DAILY
Qty: 90 TAB | Refills: 3 | Status: SHIPPED | OUTPATIENT
Start: 2018-08-06 | End: 2019-07-19 | Stop reason: ALTCHOICE

## 2018-08-06 NOTE — PROGRESS NOTES
Reviewed record in preparation for visit and have obtained necessary documentation. Identified pt with two pt identifiers(name and ).     Chief Complaint   Patient presents with   [address] Maintenance Due   Topic Date Due    Pneumococcal Vaccine (1 of  - PPSV23) 1977    Shingles Vaccine  2018    Diabetic Foot Care  2018    Flu Vaccine  2018       Coordination of Care Questionnaire:  :     1) Have you been to an emergency room, urgent care clinic since your last visit? no   Hospitalized since your last visit? no             2) Have you seen or consulted any other health care providers outside of [address] [place] [address] since your last visit? no  (Include any pap smears or colon screenings in this section.)

## 2018-08-06 NOTE — PROGRESS NOTES
Follow-up (Labs)       HPI:  Gia Espinoza is a 59y.o. year old female who is here for a follow up visit. She was last seen by me on 4/19/2018. She reports the following:    Did a detox for a month. [employer]. Blood sugars  119 avg. Highest 130's    Started on hctz on her own and stopped on own Micardis  - she felt it caused myalgia and raised her cholesterol  Past BP meds  Amlodipine caused her to swell  Losartan caused her to cough. BP (wrist) at home 120's    Vit D is a little high. Assessment and Plan        1. Benign essential hypertension  Continue present management. No changes made to regimen. Tolerating medication. Denies dizziness that is positional, SOB, or chest pain. Understands the importance of compliance to reduce risk of future heart failure. Agreed to call if any of above symptoms develop and  stay on current regimen of    - fish,bora,flax oils-om3,6,9no1 (OMEGA 3-6-9) 1,200 mg cap; Take  by mouth. - hydroCHLOROthiazide (HYDRODIURIL) 25 mg tablet; Take 1 Tab by mouth daily. Dispense: 90 Tab; Refill: 3    2. Controlled type 2 diabetes mellitus without complication, without long-term current use of insulin ([facility])  a1c is 6.9 (improved from last visit where she was over 7 as per patient). Stress at work affecting her life. Looking for a new job. Increase exercise  Continue present management. No changes made to regimen. Reduce vit D to once a week during the summer    Repeat /80 by me left arm    Bring wrist cuff in    ARB should not raise cholesterol and it likely went down with her diet. It is still not at goal but she refuses statin. This note will not be viewable in [address]           Visit Vitals    /90 (BP 1 Location: Left arm, BP Patient Position: Sitting)    Pulse 91    Temp 97.9 °F (36.6 °C) (Oral)    Resp 18    Ht 5' 3.5\" (1.613 m)    Wt 175 lb 11.2 oz (79.7 kg)    SpO2 97%    BMI 30.64 kg/m2       Historical Data    Past Medical History:   Diagnosis Date    Anemia     resolved with menopause    Arthritis     degenerative     Chronic pain     right hip    Diabetes mellitus type 2, controlled ([facility] [address] 2015    Elevated liver enzymes     H/O bone density study 10/12    normal    H/O seasonal allergies     Hx of mammogram 12    Hypercalcemia 2017    Hypertension     resolved last in May    Nausea & vomiting     Other and unspecified hyperlipidemia 2015    Pap smear for cervical cancer screening     Psychiatric disorder     anxiety no meds       Past Surgical History:   Procedure Laterality Date    HX ABDOMINOPLASTY      HX BREAST REDUCTION Bilateral 2017    BILATERAL BREAST REDUCTION performed by Ivon Mcmahan MD at [address] HX BREAST REDUCTION  2017    HX  SECTION  [id number]    HX CYST REMOVAL      HX CYST REMOVAL      right wrist    HX GI      colonoscopy    HX HEENT Right     Lasik    HX LIPECTOMY  2001    HX ORTHOPAEDIC  2016    back surgery L3- L5    HX OTHER SURGICAL      surgery to remove plantar wart    HX TONSILLECTOMY      HX TONSILLECTOMY  1967    MULTIPLE DELIVERY          Outpatient Encounter Prescriptions as of 2018   Medication Sig Dispense Refill    fish,bora,flax oils-om3,6,9no1 (OMEGA 3-6-9) 1,200 mg cap Take  by mouth.  phentermine (ADIPEX-P) 37.5 mg tablet Take 1 Tab by mouth every morning. Max Daily Amount: 37.5 mg. Indications: WEIGHT LOSS MANAGEMENT FOR OBESE PATIENT (BMI >= 30) 30 Tab 0    hydroCHLOROthiazide (HYDRODIURIL) 25 mg tablet Take 1 Tab by mouth daily. 90 Tab 0    triamcinolone (NASACORT) 55 mcg nasal inhaler 2 Sprays nightly.  loratadine (CLARITIN) 10 mg tablet Take 10 mg by mouth daily.  coenzyme q10 (CO Q-10) 10 mg cap Take 100 mg by mouth daily.  ascorbic acid, vitamin C, (VITAMIN C) 500 mg tablet Take 1,000 mg by mouth daily.       zinc 50 mg tab tablet Take 50 mg by mouth daily.  metFORMIN ER (GLUCOPHAGE XR) 500 mg tablet Take 1 Tab by mouth two (2) times a day. 180 Tab 12    B.infantis-B.ani-B.long-B.bifi (PROBIOTIC 4X) 10-15 mg TbEC Take  by mouth daily.  ONETOUCH DELICA LANCETS 30 gauge misc       glucose blood VI test strips (ASCENSIA AUTODISC VI, ONE TOUCH ULTRA TEST VI) strip Use to check blood sugar once daily. 100 Strip 1    Blood-Glucose Meter monitoring kit Use as directed. Dx: E11.9 1 Kit 0    Lancets misc Use as directed. Dx: E11.9 100 Each 1    magnesium 250 mg Tab Take  by mouth.  melatonin 3 mg tablet Take 3 mg by mouth nightly.  methylPREDNISolone (MEDROL DOSEPACK) 4 mg tablet As directed 1 Dose Pack 0     No facility-administered encounter medications on file as of 8/6/2018. Allergies   Allergen Reactions    Ceclor [Cefaclor] Rash     Amilcar Trinidad    Sulfa (Sulfonamide Antibiotics) Anaphylaxis, Shortness of Breath and Rash    Invokana [Canagliflozin] Other (comments)     Alopecia and yeast     Micardis [Telmisartan] Myalgia     Leg muscle pain - subsided with discontinuation    Azithromycin Nausea Only     Pt states she is allergic to all mycin drugs; GI upset    Ciprofloxacin Other (comments)     Headache    Lexapro [Escitalopram] Other (comments)     Pt states she had arm pains and vision changes.     Losartan Cough    Tetracycline Other (comments)     Stomach issues      Tetracycline Nausea Only    Zyrtec [Cetirizine] Other (comments)     Pt states that it makes her heart race        Social History     Social History    Marital status:      Spouse name: Single    Number of children: 2    Years of education: BA     Occupational History    NP      [id number]     Social History Main Topics    Smoking status: Never Smoker    Smokeless tobacco: Never Used    Alcohol use 0.0 oz/week     0 Standard drinks or equivalent per week      Comment: very rare    Drug use: No    Sexual activity: Not Currently     Other Topics Concern    Not on file     Social History Narrative    ** Merged History Encounter **             family history includes Arthritis-osteo in her brother and sister; Cancer in her father and paternal aunt; Coronary Artery Disease in her brother; Hypertension in her mother and sister; Lung Disease in her brother; No Known Problems in her brother. Review of Systems   Constitutional: Negative for weight loss. Eyes: Negative for blurred vision. Respiratory: Negative for shortness of breath. Cardiovascular: Negative for chest pain. Gastrointestinal: Negative for abdominal pain. Genitourinary: Negative for dysuria and frequency. Musculoskeletal: Positive for myalgias. Skin: Negative for rash. Neurological: Negative for dizziness, focal weakness, weakness and headaches. Endo/Heme/Allergies: Negative for environmental allergies. Does not bruise/bleed easily. Physical Exam   Constitutional: She appears well-developed and well-nourished. She is active. Non-toxic appearance. She does not have a sickly appearance. She does not appear ill. No distress. Eyes: Conjunctivae are normal. Right eye exhibits no discharge. Cardiovascular: Normal rate, regular rhythm, S1 normal, S2 normal, normal heart sounds and normal pulses. Exam reveals no gallop and no friction rub. Pulmonary/Chest: Effort normal and breath sounds normal. No respiratory distress. Abdominal: Soft. Bowel sounds are normal.   Musculoskeletal: She exhibits no edema or deformity. Neurological: She is alert. Skin: Skin is warm and dry. No rash noted. No pallor. Psychiatric: She has a normal mood and affect. Her behavior is normal.   Vitals reviewed. Ortho Exam      Orders Placed This Encounter    fish,bora,flax oils-om3,6,9no1 (OMEGA 3-6-9) 1,200 mg cap     Sig: Take  by mouth. I have reviewed the patient's medical history in detail and updated the computerized patient record. We had a prolonged discussion about these complex clinical issues and went over the various important aspects to consider. All questions were answered. Advised her to call back or return to office if symptoms do not improve, change in nature, or persist.    She was given an after visit summary or informed of [employer] Access which includes patient instructions, diagnoses, current medications, & vitals. She expressed understanding with the diagnosis and plan.

## 2018-08-22 RX ORDER — METFORMIN HYDROCHLORIDE 500 MG/1
TABLET, EXTENDED RELEASE ORAL
Qty: 180 TAB | Refills: 11 | Status: SHIPPED | OUTPATIENT
Start: 2018-08-22 | End: 2019-01-04 | Stop reason: DRUGHIGH

## 2018-09-28 ENCOUNTER — HOSPITAL ENCOUNTER (OUTPATIENT)
Dept: PREADMISSION TESTING | Age: 58
Discharge: HOME | End: 2018-09-28
Payer: COMMERCIAL

## 2018-09-28 VITALS
DIASTOLIC BLOOD PRESSURE: 81 MMHG | WEIGHT: 176 LBS | HEIGHT: 63 IN | SYSTOLIC BLOOD PRESSURE: 122 MMHG | HEART RATE: 82 BPM | BODY MASS INDEX: 31.18 KG/M2 | TEMPERATURE: 98.1 F

## 2018-09-28 LAB
ABO + RH BLD: NORMAL
ANION GAP SERPL CALC-SCNC: 7 MMOL/L (ref 5–15)
APPEARANCE UR: CLEAR
ATRIAL RATE: 75 BPM
BACTERIA URNS QL MICRO: NEGATIVE /HPF
BILIRUB UR QL: NEGATIVE
BLOOD GROUP ANTIBODIES SERPL: NORMAL
BUN SERPL-MCNC: 14 MG/DL (ref 6–20)
BUN/CREAT SERPL: 21 (ref 12–20)
CALCIUM SERPL-MCNC: 9.6 MG/DL (ref 8.5–10.1)
CALCULATED P AXIS, ECG09: 41 DEGREES
CALCULATED T AXIS, ECG11: 29 DEGREES
CHLORIDE SERPL-SCNC: 105 MMOL/L (ref 97–108)
CO2 SERPL-SCNC: 30 MMOL/L (ref 21–32)
COLOR UR: ABNORMAL
CREAT SERPL-MCNC: 0.66 MG/DL (ref 0.55–1.02)
DIAGNOSIS, 93000: NORMAL
EPITH CASTS URNS QL MICRO: ABNORMAL /LPF
ERYTHROCYTE [DISTWIDTH] IN BLOOD BY AUTOMATED COUNT: 14.5 % (ref 11.5–14.5)
EST. AVERAGE GLUCOSE BLD GHB EST-MCNC: 148 MG/DL
GLUCOSE SERPL-MCNC: 94 MG/DL (ref 65–100)
GLUCOSE UR STRIP.AUTO-MCNC: NEGATIVE MG/DL
HBA1C MFR BLD: 6.8 % (ref 4.2–6.3)
HCG SERPL QL: NEGATIVE
HCT VFR BLD AUTO: 42.7 % (ref 35–47)
HGB BLD-MCNC: 13.2 G/DL (ref 11.5–16)
HGB UR QL STRIP: NEGATIVE
HYALINE CASTS URNS QL MICRO: ABNORMAL /LPF (ref 0–5)
INR PPP: 1 (ref 0.9–1.1)
KETONES UR QL STRIP.AUTO: NEGATIVE MG/DL
LEUKOCYTE ESTERASE UR QL STRIP.AUTO: ABNORMAL
MCH RBC QN AUTO: 25.3 PG (ref 26–34)
MCHC RBC AUTO-ENTMCNC: 30.9 G/DL (ref 30–36.5)
MCV RBC AUTO: 81.8 FL (ref 80–99)
NITRITE UR QL STRIP.AUTO: NEGATIVE
NRBC # BLD: 0 K/UL (ref 0–0.01)
NRBC BLD-RTO: 0 PER 100 WBC
P-R INTERVAL, ECG05: 164 MS
PH UR STRIP: 7 [PH] (ref 5–8)
PLATELET # BLD AUTO: 314 K/UL (ref 150–400)
PMV BLD AUTO: 10.7 FL (ref 8.9–12.9)
POTASSIUM SERPL-SCNC: 3.9 MMOL/L (ref 3.5–5.1)
PROT UR STRIP-MCNC: NEGATIVE MG/DL
PROTHROMBIN TIME: 10.3 SEC (ref 9–11.1)
Q-T INTERVAL, ECG07: 382 MS
QRS DURATION, ECG06: 72 MS
QTC CALCULATION (BEZET), ECG08: 426 MS
RBC # BLD AUTO: 5.22 M/UL (ref 3.8–5.2)
RBC #/AREA URNS HPF: ABNORMAL /HPF (ref 0–5)
SODIUM SERPL-SCNC: 142 MMOL/L (ref 136–145)
SP GR UR REFRACTOMETRY: <1.005 (ref 1–1.03)
SPECIMEN EXP DATE BLD: NORMAL
UA: UC IF INDICATED,UAUC: ABNORMAL
UROBILINOGEN UR QL STRIP.AUTO: 0.2 EU/DL (ref 0.2–1)
VENTRICULAR RATE, ECG03: 75 BPM
WBC # BLD AUTO: 6.4 K/UL (ref 3.6–11)
WBC URNS QL MICRO: ABNORMAL /HPF (ref 0–4)

## 2018-09-28 PROCEDURE — 93005 ELECTROCARDIOGRAM TRACING: CPT

## 2018-09-28 PROCEDURE — 85027 COMPLETE CBC AUTOMATED: CPT | Performed by: ORTHOPAEDIC SURGERY

## 2018-09-28 PROCEDURE — 36415 COLL VENOUS BLD VENIPUNCTURE: CPT | Performed by: ORTHOPAEDIC SURGERY

## 2018-09-28 PROCEDURE — 86900 BLOOD TYPING SEROLOGIC ABO: CPT | Performed by: ORTHOPAEDIC SURGERY

## 2018-09-28 PROCEDURE — 84703 CHORIONIC GONADOTROPIN ASSAY: CPT | Performed by: ORTHOPAEDIC SURGERY

## 2018-09-28 PROCEDURE — 81001 URINALYSIS AUTO W/SCOPE: CPT | Performed by: ORTHOPAEDIC SURGERY

## 2018-09-28 PROCEDURE — 80048 BASIC METABOLIC PNL TOTAL CA: CPT | Performed by: ORTHOPAEDIC SURGERY

## 2018-09-28 PROCEDURE — 85610 PROTHROMBIN TIME: CPT | Performed by: ORTHOPAEDIC SURGERY

## 2018-09-28 PROCEDURE — 83036 HEMOGLOBIN GLYCOSYLATED A1C: CPT | Performed by: ORTHOPAEDIC SURGERY

## 2018-09-29 LAB
BACTERIA SPEC CULT: NORMAL
BACTERIA SPEC CULT: NORMAL
SERVICE CMNT-IMP: NORMAL

## 2018-10-03 ENCOUNTER — OFFICE VISIT (OUTPATIENT)
Dept: INTERNAL MEDICINE CLINIC | Age: 58
End: 2018-10-03

## 2018-10-03 VITALS
SYSTOLIC BLOOD PRESSURE: 128 MMHG | HEART RATE: 88 BPM | HEIGHT: 64 IN | OXYGEN SATURATION: 98 % | WEIGHT: 177.8 LBS | RESPIRATION RATE: 16 BRPM | BODY MASS INDEX: 30.35 KG/M2 | DIASTOLIC BLOOD PRESSURE: 84 MMHG | TEMPERATURE: 98.2 F

## 2018-10-03 DIAGNOSIS — I10 BENIGN ESSENTIAL HYPERTENSION: ICD-10-CM

## 2018-10-03 DIAGNOSIS — E11.9 CONTROLLED TYPE 2 DIABETES MELLITUS WITHOUT COMPLICATION, WITHOUT LONG-TERM CURRENT USE OF INSULIN (HCC): ICD-10-CM

## 2018-10-03 DIAGNOSIS — Z01.818 PRE-OP EVALUATION: Primary | ICD-10-CM

## 2018-10-03 DIAGNOSIS — Z98.890 HX OF BILATERAL BREAST REDUCTION SURGERY: ICD-10-CM

## 2018-10-03 DIAGNOSIS — E66.9 OBESITY (BMI 30-39.9): ICD-10-CM

## 2018-10-03 DIAGNOSIS — M47.817 LUMBAR AND SACRAL OSTEOARTHRITIS: ICD-10-CM

## 2018-10-03 DIAGNOSIS — M16.11 OSTEOARTHRITIS OF RIGHT HIP, UNSPECIFIED OSTEOARTHRITIS TYPE: ICD-10-CM

## 2018-10-03 RX ORDER — ASPIRIN 81 MG/1
TABLET ORAL DAILY
COMMUNITY

## 2018-10-03 NOTE — PROGRESS NOTES
60 yo female in for pre op eval for THR on 10/8/2018 at [facility] by Dr. Valdemar Merrill. See scanned in form.     Recent A1c stable at 6.8    Plan: See Dr. Valentino Lefevre in 3 mos for f/u of HTN and DM

## 2018-10-03 NOTE — MR AVS SNAPSHOT
[address] [facility], [address] [place] [address] 
[phone] Patient: Conine Mays MRN: [id number] GFN:[birth year] Visit Information Date & Time Provider Department Dept. Phone Encounter #  
 10/3/2018  8:20 AM [facility] [id number] Follow-up Instructions Return in about 3 months (around 1/3/2019) for Dr. Ludy Zhao for HTN/DM f/u. Upcoming Health Maintenance Date Due Pneumococcal 19-64 Medium Risk (1 of 1 - PPSV23) 5/2/1977 Shingrix Vaccine Age 50> (1 of 2) 5/2/2008 FOOT EXAM Q1 3/3/2018 EYE EXAM RETINAL OR DILATED Q1 9/7/2018 DTaP/Tdap/Td series (2 - Td) 1/1/2019 MICROALBUMIN Q1 2/26/2019 HEMOGLOBIN A1C Q6M 3/28/2019 BREAST CANCER SCRN MAMMOGRAM 5/9/2019 LIPID PANEL Q1 7/17/2019 PAP AKA CERVICAL CYTOLOGY 5/16/2020 COLONOSCOPY 11/1/2021 Allergies as of 10/3/2018  Review Complete On: 10/3/2018 By: Juan Mckinney NP Severity Noted Reaction Type Reactions Ceclor [Cefaclor] High 07/06/2012    Rash Jonh Cadet Sulfa (Sulfonamide Antibiotics) High 07/06/2012   Systemic Anaphylaxis, Shortness of Breath, Rash Invokana [Canagliflozin] Medium 07/21/2017   Topical Other (comments) Alopecia and yeast   
 Micardis [Telmisartan] Medium 06/27/2018   Side Effect Myalgia Leg muscle pain - subsided with discontinuation Azithromycin  07/06/2012    Nausea Only Pt states she is allergic to all mycin drugs; GI upset Ciprofloxacin  07/06/2012    Other (comments) Headache Lexapro [Escitalopram]  07/06/2012    Other (comments) Pt states she had arm pains and vision changes. Losartan  07/13/2015    Cough Other Medication  09/28/2018    Other (comments) DERMABOND GLUE, HAD BREAST REDUCTION AND WOUND ALMOST DEHISCED. Tetracycline  06/21/2012    Other (comments) Stomach issues  Zyrtec [Cetirizine]  07/14/2014   Systemic Other (comments) Pt states that it makes her heart race Current Immunizations  Reviewed on 10/3/2018 Name Date Influenza Vaccine 8/28/2018, 10/15/2017, 11/1/2016, 10/8/2014, 10/1/2013 TB Skin Test (PPD) Intradermal 9/25/2013 TDAP Vaccine 1/1/2009 Reviewed by Nilam Nguyen NP on 10/3/2018 at  8:58 AM  
You Were Diagnosed With   
  
 Codes Comments Pre-op evaluation    -  Primary ICD-10-CM: L18.090 ICD-9-CM: V72.84 Osteoarthritis of right hip, unspecified osteoarthritis type     ICD-10-CM: M16.11 
ICD-9-CM: 715.95 Controlled type 2 diabetes mellitus without complication, without long-term current use of insulin ([facility]     ICD-10-CM: E11.9 ICD-9-CM: 250.00 Benign essential hypertension     ICD-10-CM: I10 
ICD-9-CM: 401.1 Obesity (BMI 30-39. 9)     ICD-10-CM: E66.9 ICD-9-CM: 278.00 Lumbar and sacral osteoarthritis     ICD-10-CM: G77.521 ICD-9-CM: 721.3 Vitals BP Pulse Temp Resp Height(growth percentile) Weight(growth percentile) 128/84 (BP 1 Location: Left arm, BP Patient Position: Sitting) 88 98.2 °F (36.8 °C) (Oral) 16 5' 4\" (1.626 m) 177 lb 12.8 oz (80.6 kg) SpO2 BMI OB Status Smoking Status 98% 30.52 kg/m2 Postmenopausal Never Smoker Vitals History BMI and BSA Data Body Mass Index Body Surface Area 30.52 kg/m 2 1.91 m 2 Preferred Pharmacy Pharmacy Name Phone [facility] [address], [address] [place] [address] Avenue [phone] Your Updated Medication List  
  
   
This list is accurate as of 10/3/18  9:26 AM.  Always use your most recent med list.  
  
  
  
  
 aspirin delayed-release 81 mg tablet Take  by mouth daily. Blood-Glucose Meter monitoring kit Use as directed. Dx: E11.9 CLARITIN 10 mg tablet Generic drug:  loratadine Take 10 mg by mouth daily. CO Q-10 10 mg Cap Generic drug:  coenzyme q10 Take 100 mg by mouth daily. EMERGEN-C PO Take 1,000 mg by mouth daily. glucose blood VI test strips strip Commonly known as:  ASCENSIA AUTODISC VI, ONE TOUCH ULTRA TEST VI  
Use to check blood sugar once daily. hydroCHLOROthiazide 25 mg tablet Commonly known as:  HYDRODIURIL Take 1 Tab by mouth daily. * Lancets Misc Use as directed. Dx: E11.9  
  
 * ONETOUCH DELICA LANCETS 30 gauge Misc Generic drug:  lancets LUTEIN PO Take 25 mcg by mouth daily. magnesium 250 mg Tab Take 400 mg by mouth daily. melatonin 3 mg tablet Take 3 mg by mouth nightly. metFORMIN  mg tablet Commonly known as:  GLUCOPHAGE XR  
TAKE ONE TABLET BY MOUTH TWICE A DAY  
  
 NASACORT 55 mcg nasal inhaler Generic drug:  triamcinolone 2 Sprays nightly. OMEGA 3-6-9 1,200 mg Cap Generic drug:  fish,bora,flax oils-om3,6,9no1 Take  by mouth. OTHER Black seed oil 1 tsp daily OTHER  
bromium 500 mg every morning OTHER Berta daily (500 mg) potassium 99 mg tablet Take 99 mg by mouth every other day. PROBIOTIC 4X 10-15 mg Tbec Generic drug:  B.infantis-B.ani-B.long-B.bifi Take  by mouth daily. TURMERIC PO Take 500 mg by mouth daily. zinc 50 mg Tab tablet Take 50 mg by mouth daily. * Notice: This list has 2 medication(s) that are the same as other medications prescribed for you. Read the directions carefully, and ask your doctor or other care provider to review them with you. Follow-up Instructions Return in about 3 months (around 1/3/2019) for Dr. Benoit Hernandez for HTN/DM f/u. Patient Instructions STOP Metformin 24 hrs before surgery Introducing [facility]! Dear Sumit Morris: Thank you for requesting a [employer] account. Our records indicate that you already have an active [employer] account. You can access your account anytime at [email or web address] Did you know that you can access your hospital and ER discharge instructions at any time in [address] You can also review all of your test results from your hospital stay or ER visit. Additional Information If you have questions, please visit the Frequently Asked Questions section of the [employer] website at [email or web address] Remember, Keep[employer] is NOT to be used for urgent needs. For medical emergencies, dial 911. Now available from your iPhone and Android! Please provide this summary of care documentation to your next provider. Your primary care clinician is listed as Rhea Batres. If you have any questions after today's visit, please call [phone].

## 2018-10-05 ENCOUNTER — ANESTHESIA EVENT (OUTPATIENT)
Dept: SURGERY | Age: 58
DRG: 470 | End: 2018-10-05
Payer: COMMERCIAL

## 2018-10-05 NOTE — H&P
Ashlie Circle  Location: [place] [address] [facility]  Patient #: [id number]  : 1960  Single / Language: [place] / Race: Black or   Female      History of Present Illness   The patient is a 59year old female who presents with a complaint of Hip Pain. The onset of the hip pain has been gradual and has been occurring in a persistent pattern for 3 months. The course has been gradually worsening. The hip pain is described as mild to moderate. The hip pain is described as being located in the hip (right). The pain is aggravated by walking, sitting, prolonged standing, bending and squatting. Relieving factors include rest. Note for \"Hip Pain\": Patient presents today for evaluation of her right hip. She has been seen in the past for bilateral hip osteoarthritis. Her right hip pain has become progressively worse over the last 6 months though she has had bilateral hip pain (right worse than left) for over 2 years. She is no longer able to walk more than 1 block due to pain. She is unable to put her shoes and socks on without assistance. Patient's pain has become so severe that she requires tramadol and gabapentin for symptom relief. She also has a TENS unit. Past medical history is complicated by diabetes. She has never had a blood clot. Patient has had a previous lumbar spine fusion. She is here today in hopes of scheduling hip replacement surgery.       Problem List/Past Medical   DIETARY COUNSELING (V65.3)    ELBOW PAIN (719.42)    LATERAL EPICONDYLITIS (726.32  M77.10)    REVIEW OF SYSTEMS: Systems were reviewed by the provider.    Primary osteoarthritis of both hips (715.15  M16.0)    Bilateral hip pain (719.45  M25.551, M25.552)    Weight above 97th percentile (V49.89  Z78.9)      Allergies  Erythromycins    Cipro *FLUOROQUINOLONES*    Tetracycline *CHEMICALS*    Sulfa Drugs    Ceclor *CEPHALOSPORINS*    Lexapro *ANTIDEPRESSANTS*    Allergies Reconciled      Family History   Cancer    Diabetes Mellitus    Heart Disease    Hypertension      Social History   Illicit drug use   47/75/7711: none  Seat Belt Use   08/26/2014: always  Tobacco use   Never smoker. Alcohol use   08/26/2014: Drinks 1 time per week having 1-2 drinks per occasion, never having more than 5 drinks per occasion. Caffeine use   08/26/2014: Drinks coffee and tea 1-2 times a day. Exercise   08/26/2014: Bicycles, walks and does other exercise 1-2 times a week. Medication History   HydroCHLOROthiazide  (12.5MG Capsule, Oral) Active. MetFORMIN HCl  (500MG Tablet, Oral) Active. Medications Reconciled     Past Surgical History   Tubal Ligation    Other Eye Surgery   right  Other Joint Surgery    Tonsillectomy      Diagnostic Studies History  Hip X-ray   Date: 2/18/2016, Results: . Patient's hips are x-rayed today. 3 views in total. She has near end-stage osteoarthritis of both hips bone to bone. An underlying diagnosis is hip impingement. Other Problems   Unspecified Diagnosis    Arthritis    Anemia    Anxiety Disorder          Review of Systems   General Not Present- Appetite Loss, Chills, Fatigue, Fever, Night Sweats, Weight Gain and Weight Loss. Skin Not Present- Itching, Nail Changes, Poor Wound Healing, Rash, Skin Color Changes, Suspicious Lesions and Yellowish Skin Color. HEENT Not Present- Decreased Hearing, Earache, Hoarseness, Loose Teeth, Nose Bleed, Ringing in the Ears and Sore Throat. Respiratory Not Present- Bloody sputum, Chronic Cough, Difficulty Breathing, Snoring, Wakes up from Sleep Wheezing or Short of Breath and Wheezing. Cardiovascular Not Present- Bluish Discoloration Of Lips Or Nails, Chest Pain, Difficulty Breathing Lying Down, Difficulty Breathing On Exertion, Leg Cramps With Exertion, Palpitations and Swelling of Extremities. Gastrointestinal Present- Constipation.  Not Present- Abdominal Pain, Black, Tarry Stool, Change in Bowel Habits, Cirrhosis, Diarrhea, Difficulty Swallowing, Nausea and Vomiting. Female Genitourinary Not Present- Blood in Urine, Frequency, Painful Urination, Pelvic Pain, Trouble Starting Urinary Stream and Urgency. Musculoskeletal Present- Back Pain and Joint Stiffness. Not Present- Joint Pain and Joint Swelling. Neurological Not Present- Fainting, Headaches, Memory Loss, Numbness, Seizures, Tingling, Tremor, Unsteadiness and Weakness. Psychiatric Not Present- Anxiety, Bipolar and Depression. Endocrine Present- Heat Intolerance. Not Present- Cold Intolerance, Excessive Hunger, Excessive Thirst and Excessive Urination. Hematology Not Present- Abnormal Bruising , Enlarged Lymph Nodes, Excessive bleeding and Skin Discoloration. Vitals   Weight: 175 lb   Height: 63 in   Weight was reported by patient. Height was reported by patient. Body Surface Area: 1.83 m²   Body Mass Index: 31 kg/m²                Physical Exam   Musculoskeletal  Global Assessment  Examination of related systems reveals - well-developed, well-nourished, in no acute distress, alert and oriented x 3, no rashes or ulcers of bilateral upper and lower extremities, head or trunk, no generalized swelling or edema of extremities and normal coordination. Gait and Station: Note: patient walks with a limp favoring the right leg. s/p spinal fusion. Right Lower Extremity - Note: Patient's right hip was examined. Extremely limited internal and external rotation with pain. Positive log roll test. Positive apprehension sign. Hip flexes to 90 degrees. No trochanteric tenderness. Palpable dorsalis pedis pulse. Skin is intact. Foot is well-perfused and sensate. 5/5 strength throughout right lower extremity. Left Lower Extremity - Note: Patient's left hip was examined. Limited internal and external rotation with pain. Positive log roll test. Positive apprehension sign. Hip flexes to 100 degrees. No trochanteric tenderness. Palpable dorsalis pedis pulse. Skin is intact. Foot is well-perfused and sensate.  5/5 strength throughout left lower extremity. Assessment & Plan   Primary osteoarthritis of both hips (715.15  M16.0)  Impression: Patient presents today with known DJD of bilateral hips. Right hip pain has become severe and she is no longer able to tolaterate it. Patient has completed PT in the past which did not help. Xrays show bone-on-bone arthritis of the right hip. Exam is consistent with xray findings. Patient would like to proceed with a right total hip replacement. Right hip surgery scheduled. Continue to maintain good blood sugar control. Continue pain management with PCP. All questions were answered. She may follow up as needed prior to surgery. Current Plans  Pt Education - How to access health information online: discussed with patient and provided information. Pt Education - Educational materials were provided.: discussed with patient and provided information. X-RAY EXAM OF HIP COMPLETE min 2 VIEWS (16125) (2 views of the right hip reveal severe, bone-on-bone osteoarthritis of bilateral hip joints.  no acute fractures seen.)  REVIEW OF SYSTEMS: Systems were reviewed by the provider.(V49.9)  Weight above 97th percentile (V49.89  Z78.9)  Current Plans  LIFESTYLE EDUCATION REGARDING DIET (51014)    Uzair Anderson MD

## 2018-10-08 ENCOUNTER — APPOINTMENT (OUTPATIENT)
Dept: GENERAL RADIOLOGY | Age: 58
DRG: 470 | End: 2018-10-08
Attending: ORTHOPAEDIC SURGERY
Payer: COMMERCIAL

## 2018-10-08 ENCOUNTER — APPOINTMENT (OUTPATIENT)
Dept: GENERAL RADIOLOGY | Age: 58
DRG: 470 | End: 2018-10-08
Attending: PHYSICIAN ASSISTANT
Payer: COMMERCIAL

## 2018-10-08 ENCOUNTER — HOSPITAL ENCOUNTER (INPATIENT)
Age: 58
LOS: 2 days | Discharge: HOME HEALTH | DRG: 470 | End: 2018-10-10
Attending: ORTHOPAEDIC SURGERY | Admitting: ORTHOPAEDIC SURGERY
Payer: COMMERCIAL

## 2018-10-08 ENCOUNTER — ANESTHESIA (OUTPATIENT)
Dept: SURGERY | Age: 58
DRG: 470 | End: 2018-10-08
Payer: COMMERCIAL

## 2018-10-08 DIAGNOSIS — M16.11 PRIMARY OSTEOARTHRITIS OF RIGHT HIP: Primary | ICD-10-CM

## 2018-10-08 LAB
GLUCOSE BLD STRIP.AUTO-MCNC: 115 MG/DL (ref 65–100)
GLUCOSE BLD STRIP.AUTO-MCNC: 118 MG/DL (ref 65–100)
GLUCOSE BLD STRIP.AUTO-MCNC: 143 MG/DL (ref 65–100)
GLUCOSE BLD STRIP.AUTO-MCNC: 275 MG/DL (ref 65–100)
SERVICE CMNT-IMP: ABNORMAL

## 2018-10-08 PROCEDURE — 77030019908 HC STETH ESOPH SIMS -A: Performed by: ANESTHESIOLOGY

## 2018-10-08 PROCEDURE — P9045 ALBUMIN (HUMAN), 5%, 250 ML: HCPCS

## 2018-10-08 PROCEDURE — 77030026438 HC STYL ET INTUB CARD -A: Performed by: ANESTHESIOLOGY

## 2018-10-08 PROCEDURE — 77030038020 HC MANFLD NEPTUNE STRY -B: Performed by: ORTHOPAEDIC SURGERY

## 2018-10-08 PROCEDURE — 77030031139 HC SUT VCRL2 J&J -A: Performed by: ORTHOPAEDIC SURGERY

## 2018-10-08 PROCEDURE — 77030037715 HC DRSG ZIP STRY -B: Performed by: ORTHOPAEDIC SURGERY

## 2018-10-08 PROCEDURE — 77030011640 HC PAD GRND REM COVD -A: Performed by: ORTHOPAEDIC SURGERY

## 2018-10-08 PROCEDURE — 77030037713 HC CLOSR DEV INCIS ZIP STRY -B: Performed by: ORTHOPAEDIC SURGERY

## 2018-10-08 PROCEDURE — 77030006822 HC BLD SAW SAG BRSM -B: Performed by: ORTHOPAEDIC SURGERY

## 2018-10-08 PROCEDURE — 74011000258 HC RX REV CODE- 258: Performed by: ORTHOPAEDIC SURGERY

## 2018-10-08 PROCEDURE — 73501 X-RAY EXAM HIP UNI 1 VIEW: CPT

## 2018-10-08 PROCEDURE — 77030012935 HC DRSG AQUACEL BMS -B: Performed by: ORTHOPAEDIC SURGERY

## 2018-10-08 PROCEDURE — 77030036660

## 2018-10-08 PROCEDURE — 74011000250 HC RX REV CODE- 250

## 2018-10-08 PROCEDURE — 77030008684 HC TU ET CUF COVD -B: Performed by: ANESTHESIOLOGY

## 2018-10-08 PROCEDURE — 77030013079 HC BLNKT BAIR HGGR 3M -A: Performed by: ANESTHESIOLOGY

## 2018-10-08 PROCEDURE — 74011250636 HC RX REV CODE- 250/636

## 2018-10-08 PROCEDURE — 97530 THERAPEUTIC ACTIVITIES: CPT

## 2018-10-08 PROCEDURE — 76000 FLUOROSCOPY <1 HR PHYS/QHP: CPT

## 2018-10-08 PROCEDURE — 74011250636 HC RX REV CODE- 250/636: Performed by: ANESTHESIOLOGY

## 2018-10-08 PROCEDURE — 74011636637 HC RX REV CODE- 636/637: Performed by: PHYSICIAN ASSISTANT

## 2018-10-08 PROCEDURE — C1776 JOINT DEVICE (IMPLANTABLE): HCPCS | Performed by: ORTHOPAEDIC SURGERY

## 2018-10-08 PROCEDURE — 77030018846 HC SOL IRR STRL H20 ICUM -A: Performed by: ORTHOPAEDIC SURGERY

## 2018-10-08 PROCEDURE — 77030020788: Performed by: ORTHOPAEDIC SURGERY

## 2018-10-08 PROCEDURE — 74011250637 HC RX REV CODE- 250/637: Performed by: PHYSICIAN ASSISTANT

## 2018-10-08 PROCEDURE — 76060000037 HC ANESTHESIA 3 TO 3.5 HR: Performed by: ORTHOPAEDIC SURGERY

## 2018-10-08 PROCEDURE — 97161 PT EVAL LOW COMPLEX 20 MIN: CPT

## 2018-10-08 PROCEDURE — 74011000258 HC RX REV CODE- 258

## 2018-10-08 PROCEDURE — 77030002933 HC SUT MCRYL J&J -A: Performed by: ORTHOPAEDIC SURGERY

## 2018-10-08 PROCEDURE — 74011000250 HC RX REV CODE- 250: Performed by: ORTHOPAEDIC SURGERY

## 2018-10-08 PROCEDURE — 77030018836 HC SOL IRR NACL ICUM -A: Performed by: ORTHOPAEDIC SURGERY

## 2018-10-08 PROCEDURE — 76010000172 HC OR TIME 2.5 TO 3 HR INTENSV-TIER 1: Performed by: ORTHOPAEDIC SURGERY

## 2018-10-08 PROCEDURE — 76210000006 HC OR PH I REC 0.5 TO 1 HR: Performed by: ORTHOPAEDIC SURGERY

## 2018-10-08 PROCEDURE — G8978 MOBILITY CURRENT STATUS: HCPCS

## 2018-10-08 PROCEDURE — 77030027138 HC INCENT SPIROMETER -A

## 2018-10-08 PROCEDURE — 77030020365 HC SOL INJ SOD CL 0.9% 50ML: Performed by: ORTHOPAEDIC SURGERY

## 2018-10-08 PROCEDURE — 77030039267 HC ADH SKN EXOFIN S2SG -B: Performed by: ORTHOPAEDIC SURGERY

## 2018-10-08 PROCEDURE — 77030020782 HC GWN BAIR PAWS FLX 3M -B

## 2018-10-08 PROCEDURE — 74011250637 HC RX REV CODE- 250/637: Performed by: ORTHOPAEDIC SURGERY

## 2018-10-08 PROCEDURE — 74011250636 HC RX REV CODE- 250/636: Performed by: PHYSICIAN ASSISTANT

## 2018-10-08 PROCEDURE — 74011250637 HC RX REV CODE- 250/637

## 2018-10-08 PROCEDURE — C9290 INJ, BUPIVACAINE LIPOSOME: HCPCS | Performed by: ORTHOPAEDIC SURGERY

## 2018-10-08 PROCEDURE — 0SR902Z REPLACEMENT OF RIGHT HIP JOINT WITH METAL ON POLYETHYLENE SYNTHETIC SUBSTITUTE, OPEN APPROACH: ICD-10-PCS | Performed by: ORTHOPAEDIC SURGERY

## 2018-10-08 PROCEDURE — 74011250636 HC RX REV CODE- 250/636: Performed by: ORTHOPAEDIC SURGERY

## 2018-10-08 PROCEDURE — 77030034850: Performed by: ORTHOPAEDIC SURGERY

## 2018-10-08 PROCEDURE — 65270000029 HC RM PRIVATE

## 2018-10-08 PROCEDURE — 82962 GLUCOSE BLOOD TEST: CPT

## 2018-10-08 PROCEDURE — 77030032490 HC SLV COMPR SCD KNE COVD -B

## 2018-10-08 PROCEDURE — G8979 MOBILITY GOAL STATUS: HCPCS

## 2018-10-08 DEVICE — REFLECTION SPHERICAL HEAD SCREW 30MM
Type: IMPLANTABLE DEVICE | Site: HIP | Status: FUNCTIONAL
Brand: REFLECTION

## 2018-10-08 DEVICE — COMPONENT TOT HIP CAPPED FEM ADV HD POROUS OXIN XLPE R3: Type: IMPLANTABLE DEVICE | Status: FUNCTIONAL

## 2018-10-08 DEVICE — REFLECTION SPHERICAL HEAD SCREW 20MM
Type: IMPLANTABLE DEVICE | Site: HIP | Status: FUNCTIONAL
Brand: REFLECTION

## 2018-10-08 DEVICE — R3 3 HOLE ACETABULAR SHELL 48MM
Type: IMPLANTABLE DEVICE | Site: HIP | Status: FUNCTIONAL
Brand: R3 ACETABULAR

## 2018-10-08 DEVICE — REFLECTION THREADED HOLE COVER
Type: IMPLANTABLE DEVICE | Site: HIP | Status: FUNCTIONAL
Brand: REFLECTION

## 2018-10-08 DEVICE — R3 0 DEGREE XLPE ACETABULAR LINER                                    32MM ID X OD 48MM
Type: IMPLANTABLE DEVICE | Site: HIP | Status: FUNCTIONAL
Brand: R3

## 2018-10-08 DEVICE — OXINIUM FEMORAL HEAD 12/14 TAPER                                    32MM +4
Type: IMPLANTABLE DEVICE | Site: HIP | Status: FUNCTIONAL
Brand: OXINIUM

## 2018-10-08 DEVICE — POLARSTEM COLLAR STANDARD                                    NON-CEMENTED WITH TI/HA 0
Type: IMPLANTABLE DEVICE | Site: HIP | Status: FUNCTIONAL
Brand: POLARSTEM

## 2018-10-08 RX ORDER — HYDROCHLOROTHIAZIDE 25 MG/1
25 TABLET ORAL DAILY
Status: DISCONTINUED | OUTPATIENT
Start: 2018-10-09 | End: 2018-10-10 | Stop reason: HOSPADM

## 2018-10-08 RX ORDER — DEXAMETHASONE SODIUM PHOSPHATE 4 MG/ML
INJECTION, SOLUTION INTRA-ARTICULAR; INTRALESIONAL; INTRAMUSCULAR; INTRAVENOUS; SOFT TISSUE AS NEEDED
Status: DISCONTINUED | OUTPATIENT
Start: 2018-10-08 | End: 2018-10-08 | Stop reason: HOSPADM

## 2018-10-08 RX ORDER — GLYCOPYRROLATE 0.2 MG/ML
INJECTION INTRAMUSCULAR; INTRAVENOUS AS NEEDED
Status: DISCONTINUED | OUTPATIENT
Start: 2018-10-08 | End: 2018-10-08 | Stop reason: HOSPADM

## 2018-10-08 RX ORDER — ROCURONIUM BROMIDE 10 MG/ML
INJECTION, SOLUTION INTRAVENOUS AS NEEDED
Status: DISCONTINUED | OUTPATIENT
Start: 2018-10-08 | End: 2018-10-08 | Stop reason: HOSPADM

## 2018-10-08 RX ORDER — ONDANSETRON 2 MG/ML
4 INJECTION INTRAMUSCULAR; INTRAVENOUS AS NEEDED
Status: DISCONTINUED | OUTPATIENT
Start: 2018-10-08 | End: 2018-10-08 | Stop reason: HOSPADM

## 2018-10-08 RX ORDER — DEXMEDETOMIDINE HYDROCHLORIDE 4 UG/ML
INJECTION, SOLUTION INTRAVENOUS AS NEEDED
Status: DISCONTINUED | OUTPATIENT
Start: 2018-10-08 | End: 2018-10-08 | Stop reason: HOSPADM

## 2018-10-08 RX ORDER — LIDOCAINE HYDROCHLORIDE 20 MG/ML
INJECTION, SOLUTION EPIDURAL; INFILTRATION; INTRACAUDAL; PERINEURAL AS NEEDED
Status: DISCONTINUED | OUTPATIENT
Start: 2018-10-08 | End: 2018-10-08 | Stop reason: HOSPADM

## 2018-10-08 RX ORDER — SODIUM CHLORIDE 0.9 % (FLUSH) 0.9 %
5-10 SYRINGE (ML) INJECTION AS NEEDED
Status: DISCONTINUED | OUTPATIENT
Start: 2018-10-08 | End: 2018-10-08 | Stop reason: HOSPADM

## 2018-10-08 RX ORDER — SODIUM CHLORIDE, SODIUM LACTATE, POTASSIUM CHLORIDE, CALCIUM CHLORIDE 600; 310; 30; 20 MG/100ML; MG/100ML; MG/100ML; MG/100ML
125 INJECTION, SOLUTION INTRAVENOUS CONTINUOUS
Status: DISCONTINUED | OUTPATIENT
Start: 2018-10-08 | End: 2018-10-08 | Stop reason: HOSPADM

## 2018-10-08 RX ORDER — TRAMADOL HYDROCHLORIDE 50 MG/1
50 TABLET ORAL
Status: DISCONTINUED | OUTPATIENT
Start: 2018-10-08 | End: 2018-10-10 | Stop reason: HOSPADM

## 2018-10-08 RX ORDER — SODIUM CHLORIDE, SODIUM LACTATE, POTASSIUM CHLORIDE, CALCIUM CHLORIDE 600; 310; 30; 20 MG/100ML; MG/100ML; MG/100ML; MG/100ML
25 INJECTION, SOLUTION INTRAVENOUS CONTINUOUS
Status: DISCONTINUED | OUTPATIENT
Start: 2018-10-08 | End: 2018-10-08 | Stop reason: HOSPADM

## 2018-10-08 RX ORDER — AMOXICILLIN 250 MG
1 CAPSULE ORAL 2 TIMES DAILY
Status: DISCONTINUED | OUTPATIENT
Start: 2018-10-08 | End: 2018-10-10 | Stop reason: HOSPADM

## 2018-10-08 RX ORDER — MIDAZOLAM HYDROCHLORIDE 1 MG/ML
1 INJECTION, SOLUTION INTRAMUSCULAR; INTRAVENOUS AS NEEDED
Status: DISCONTINUED | OUTPATIENT
Start: 2018-10-08 | End: 2018-10-08 | Stop reason: HOSPADM

## 2018-10-08 RX ORDER — MORPHINE SULFATE 2 MG/ML
2 INJECTION, SOLUTION INTRAMUSCULAR; INTRAVENOUS
Status: DISCONTINUED | OUTPATIENT
Start: 2018-10-08 | End: 2018-10-10 | Stop reason: HOSPADM

## 2018-10-08 RX ORDER — CEFAZOLIN SODIUM/WATER 2 G/20 ML
2 SYRINGE (ML) INTRAVENOUS EVERY 8 HOURS
Status: COMPLETED | OUTPATIENT
Start: 2018-10-08 | End: 2018-10-09

## 2018-10-08 RX ORDER — POLYETHYLENE GLYCOL 3350 17 G/17G
17 POWDER, FOR SOLUTION ORAL DAILY
Status: DISCONTINUED | OUTPATIENT
Start: 2018-10-09 | End: 2018-10-10 | Stop reason: HOSPADM

## 2018-10-08 RX ORDER — FENTANYL CITRATE 50 UG/ML
50 INJECTION, SOLUTION INTRAMUSCULAR; INTRAVENOUS AS NEEDED
Status: DISCONTINUED | OUTPATIENT
Start: 2018-10-08 | End: 2018-10-08 | Stop reason: HOSPADM

## 2018-10-08 RX ORDER — PHENYLEPHRINE HCL IN 0.9% NACL 0.4MG/10ML
SYRINGE (ML) INTRAVENOUS AS NEEDED
Status: DISCONTINUED | OUTPATIENT
Start: 2018-10-08 | End: 2018-10-08 | Stop reason: HOSPADM

## 2018-10-08 RX ORDER — MIDAZOLAM HYDROCHLORIDE 1 MG/ML
0.5 INJECTION, SOLUTION INTRAMUSCULAR; INTRAVENOUS
Status: DISCONTINUED | OUTPATIENT
Start: 2018-10-08 | End: 2018-10-08 | Stop reason: HOSPADM

## 2018-10-08 RX ORDER — MAGNESIUM SULFATE 100 %
4 CRYSTALS MISCELLANEOUS AS NEEDED
Status: DISCONTINUED | OUTPATIENT
Start: 2018-10-08 | End: 2018-10-10 | Stop reason: HOSPADM

## 2018-10-08 RX ORDER — NALOXONE HYDROCHLORIDE 0.4 MG/ML
0.4 INJECTION, SOLUTION INTRAMUSCULAR; INTRAVENOUS; SUBCUTANEOUS AS NEEDED
Status: DISCONTINUED | OUTPATIENT
Start: 2018-10-08 | End: 2018-10-10 | Stop reason: HOSPADM

## 2018-10-08 RX ORDER — MORPHINE SULFATE 10 MG/ML
2 INJECTION, SOLUTION INTRAMUSCULAR; INTRAVENOUS
Status: DISCONTINUED | OUTPATIENT
Start: 2018-10-08 | End: 2018-10-08 | Stop reason: HOSPADM

## 2018-10-08 RX ORDER — DIPHENHYDRAMINE HYDROCHLORIDE 50 MG/ML
12.5 INJECTION, SOLUTION INTRAMUSCULAR; INTRAVENOUS
Status: ACTIVE | OUTPATIENT
Start: 2018-10-08 | End: 2018-10-09

## 2018-10-08 RX ORDER — LIDOCAINE HYDROCHLORIDE 10 MG/ML
0.1 INJECTION, SOLUTION EPIDURAL; INFILTRATION; INTRACAUDAL; PERINEURAL AS NEEDED
Status: DISCONTINUED | OUTPATIENT
Start: 2018-10-08 | End: 2018-10-08 | Stop reason: HOSPADM

## 2018-10-08 RX ORDER — INSULIN LISPRO 100 [IU]/ML
INJECTION, SOLUTION INTRAVENOUS; SUBCUTANEOUS
Status: DISCONTINUED | OUTPATIENT
Start: 2018-10-08 | End: 2018-10-10 | Stop reason: HOSPADM

## 2018-10-08 RX ORDER — ACETAMINOPHEN 500 MG
1000 TABLET ORAL ONCE
Status: COMPLETED | OUTPATIENT
Start: 2018-10-08 | End: 2018-10-08

## 2018-10-08 RX ORDER — KETOROLAC TROMETHAMINE 30 MG/ML
30 INJECTION, SOLUTION INTRAMUSCULAR; INTRAVENOUS EVERY 6 HOURS
Status: COMPLETED | OUTPATIENT
Start: 2018-10-08 | End: 2018-10-09

## 2018-10-08 RX ORDER — FENTANYL CITRATE 50 UG/ML
25 INJECTION, SOLUTION INTRAMUSCULAR; INTRAVENOUS
Status: DISCONTINUED | OUTPATIENT
Start: 2018-10-08 | End: 2018-10-08 | Stop reason: HOSPADM

## 2018-10-08 RX ORDER — FLUTICASONE PROPIONATE 50 MCG
2 SPRAY, SUSPENSION (ML) NASAL
Status: DISCONTINUED | OUTPATIENT
Start: 2018-10-08 | End: 2018-10-10 | Stop reason: HOSPADM

## 2018-10-08 RX ORDER — SODIUM CHLORIDE 9 MG/ML
INJECTION, SOLUTION INTRAVENOUS
Status: DISCONTINUED | OUTPATIENT
Start: 2018-10-08 | End: 2018-10-08 | Stop reason: HOSPADM

## 2018-10-08 RX ORDER — CEFAZOLIN SODIUM IN 0.9 % NACL 2 G/100 ML
PLASTIC BAG, INJECTION (ML) INTRAVENOUS AS NEEDED
Status: DISCONTINUED | OUTPATIENT
Start: 2018-10-08 | End: 2018-10-08 | Stop reason: HOSPADM

## 2018-10-08 RX ORDER — VANCOMYCIN/0.9 % SOD CHLORIDE 1.5G/250ML
1500 PLASTIC BAG, INJECTION (ML) INTRAVENOUS ONCE
Status: DISCONTINUED | OUTPATIENT
Start: 2018-10-08 | End: 2018-10-08

## 2018-10-08 RX ORDER — POTASSIUM CHLORIDE 750 MG/1
10 TABLET, FILM COATED, EXTENDED RELEASE ORAL EVERY OTHER DAY
Status: DISCONTINUED | OUTPATIENT
Start: 2018-10-09 | End: 2018-10-10 | Stop reason: HOSPADM

## 2018-10-08 RX ORDER — PROPOFOL 10 MG/ML
INJECTION, EMULSION INTRAVENOUS AS NEEDED
Status: DISCONTINUED | OUTPATIENT
Start: 2018-10-08 | End: 2018-10-08 | Stop reason: HOSPADM

## 2018-10-08 RX ORDER — SCOLOPAMINE TRANSDERMAL SYSTEM 1 MG/1
1 PATCH, EXTENDED RELEASE TRANSDERMAL
Status: DISCONTINUED | OUTPATIENT
Start: 2018-10-08 | End: 2018-10-08 | Stop reason: HOSPADM

## 2018-10-08 RX ORDER — SODIUM CHLORIDE 0.9 % (FLUSH) 0.9 %
5-10 SYRINGE (ML) INJECTION AS NEEDED
Status: DISCONTINUED | OUTPATIENT
Start: 2018-10-08 | End: 2018-10-10 | Stop reason: HOSPADM

## 2018-10-08 RX ORDER — DIPHENHYDRAMINE HYDROCHLORIDE 50 MG/ML
12.5 INJECTION, SOLUTION INTRAMUSCULAR; INTRAVENOUS AS NEEDED
Status: DISCONTINUED | OUTPATIENT
Start: 2018-10-08 | End: 2018-10-08 | Stop reason: HOSPADM

## 2018-10-08 RX ORDER — SODIUM CHLORIDE 0.9 % (FLUSH) 0.9 %
5-10 SYRINGE (ML) INJECTION EVERY 8 HOURS
Status: DISCONTINUED | OUTPATIENT
Start: 2018-10-09 | End: 2018-10-10 | Stop reason: HOSPADM

## 2018-10-08 RX ORDER — LORATADINE 10 MG/1
10 TABLET ORAL DAILY
Status: DISCONTINUED | OUTPATIENT
Start: 2018-10-09 | End: 2018-10-10 | Stop reason: HOSPADM

## 2018-10-08 RX ORDER — SUCCINYLCHOLINE CHLORIDE 20 MG/ML
INJECTION INTRAMUSCULAR; INTRAVENOUS AS NEEDED
Status: DISCONTINUED | OUTPATIENT
Start: 2018-10-08 | End: 2018-10-08 | Stop reason: HOSPADM

## 2018-10-08 RX ORDER — OXYCODONE HYDROCHLORIDE 5 MG/1
5 TABLET ORAL AS NEEDED
Status: DISCONTINUED | OUTPATIENT
Start: 2018-10-08 | End: 2018-10-08 | Stop reason: HOSPADM

## 2018-10-08 RX ORDER — ACETAMINOPHEN 500 MG
1000 TABLET ORAL EVERY 6 HOURS
Status: DISCONTINUED | OUTPATIENT
Start: 2018-10-08 | End: 2018-10-09

## 2018-10-08 RX ORDER — SCOLOPAMINE TRANSDERMAL SYSTEM 1 MG/1
PATCH, EXTENDED RELEASE TRANSDERMAL AS NEEDED
Status: DISCONTINUED | OUTPATIENT
Start: 2018-10-08 | End: 2018-10-08 | Stop reason: HOSPADM

## 2018-10-08 RX ORDER — FACIAL-BODY WIPES
10 EACH TOPICAL DAILY PRN
Status: DISCONTINUED | OUTPATIENT
Start: 2018-10-10 | End: 2018-10-10 | Stop reason: HOSPADM

## 2018-10-08 RX ORDER — ONDANSETRON 2 MG/ML
4 INJECTION INTRAMUSCULAR; INTRAVENOUS
Status: ACTIVE | OUTPATIENT
Start: 2018-10-08 | End: 2018-10-09

## 2018-10-08 RX ORDER — ALBUMIN HUMAN 50 G/1000ML
SOLUTION INTRAVENOUS AS NEEDED
Status: DISCONTINUED | OUTPATIENT
Start: 2018-10-08 | End: 2018-10-08 | Stop reason: HOSPADM

## 2018-10-08 RX ORDER — LANOLIN ALCOHOL/MO/W.PET/CERES
3 CREAM (GRAM) TOPICAL
Status: DISCONTINUED | OUTPATIENT
Start: 2018-10-08 | End: 2018-10-10 | Stop reason: HOSPADM

## 2018-10-08 RX ORDER — DEXTROSE 50 % IN WATER (D50W) INTRAVENOUS SYRINGE
12.5-25 AS NEEDED
Status: DISCONTINUED | OUTPATIENT
Start: 2018-10-08 | End: 2018-10-10 | Stop reason: HOSPADM

## 2018-10-08 RX ORDER — SODIUM CHLORIDE 0.9 % (FLUSH) 0.9 %
5-10 SYRINGE (ML) INJECTION EVERY 8 HOURS
Status: DISCONTINUED | OUTPATIENT
Start: 2018-10-08 | End: 2018-10-08 | Stop reason: HOSPADM

## 2018-10-08 RX ORDER — NEOSTIGMINE METHYLSULFATE 1 MG/ML
INJECTION INTRAVENOUS AS NEEDED
Status: DISCONTINUED | OUTPATIENT
Start: 2018-10-08 | End: 2018-10-08 | Stop reason: HOSPADM

## 2018-10-08 RX ORDER — HYDROMORPHONE HYDROCHLORIDE 2 MG/ML
INJECTION, SOLUTION INTRAMUSCULAR; INTRAVENOUS; SUBCUTANEOUS AS NEEDED
Status: DISCONTINUED | OUTPATIENT
Start: 2018-10-08 | End: 2018-10-08 | Stop reason: HOSPADM

## 2018-10-08 RX ORDER — SODIUM CHLORIDE, SODIUM LACTATE, POTASSIUM CHLORIDE, CALCIUM CHLORIDE 600; 310; 30; 20 MG/100ML; MG/100ML; MG/100ML; MG/100ML
INJECTION, SOLUTION INTRAVENOUS
Status: DISCONTINUED | OUTPATIENT
Start: 2018-10-08 | End: 2018-10-08 | Stop reason: HOSPADM

## 2018-10-08 RX ORDER — FENTANYL CITRATE 50 UG/ML
INJECTION, SOLUTION INTRAMUSCULAR; INTRAVENOUS AS NEEDED
Status: DISCONTINUED | OUTPATIENT
Start: 2018-10-08 | End: 2018-10-08 | Stop reason: HOSPADM

## 2018-10-08 RX ORDER — SODIUM CHLORIDE 9 MG/ML
125 INJECTION, SOLUTION INTRAVENOUS CONTINUOUS
Status: DISPENSED | OUTPATIENT
Start: 2018-10-08 | End: 2018-10-09

## 2018-10-08 RX ORDER — OXYCODONE HYDROCHLORIDE 5 MG/1
5-10 TABLET ORAL
Status: DISCONTINUED | OUTPATIENT
Start: 2018-10-08 | End: 2018-10-10 | Stop reason: HOSPADM

## 2018-10-08 RX ORDER — SODIUM CHLORIDE 9 MG/ML
25 INJECTION, SOLUTION INTRAVENOUS CONTINUOUS
Status: DISCONTINUED | OUTPATIENT
Start: 2018-10-08 | End: 2018-10-08 | Stop reason: HOSPADM

## 2018-10-08 RX ADMIN — RIVAROXABAN 10 MG: 10 TABLET, FILM COATED ORAL at 20:21

## 2018-10-08 RX ADMIN — GLYCOPYRROLATE 0.4 MG: 0.2 INJECTION INTRAMUSCULAR; INTRAVENOUS at 10:38

## 2018-10-08 RX ADMIN — Medication 80 MCG: at 10:30

## 2018-10-08 RX ADMIN — FENTANYL CITRATE 25 MCG: 50 INJECTION, SOLUTION INTRAMUSCULAR; INTRAVENOUS at 11:58

## 2018-10-08 RX ADMIN — INSULIN LISPRO 2 UNITS: 100 INJECTION, SOLUTION INTRAVENOUS; SUBCUTANEOUS at 11:43

## 2018-10-08 RX ADMIN — DEXMEDETOMIDINE HYDROCHLORIDE 4 MCG: 4 INJECTION, SOLUTION INTRAVENOUS at 10:40

## 2018-10-08 RX ADMIN — SODIUM CHLORIDE: 9 INJECTION, SOLUTION INTRAVENOUS at 10:03

## 2018-10-08 RX ADMIN — HYDROMORPHONE HYDROCHLORIDE 0.5 MG: 2 INJECTION, SOLUTION INTRAMUSCULAR; INTRAVENOUS; SUBCUTANEOUS at 08:58

## 2018-10-08 RX ADMIN — SCOLOPAMINE TRANSDERMAL SYSTEM 1 PATCH: 1 PATCH, EXTENDED RELEASE TRANSDERMAL at 08:13

## 2018-10-08 RX ADMIN — FENTANYL CITRATE 100 MCG: 50 INJECTION, SOLUTION INTRAMUSCULAR; INTRAVENOUS at 08:22

## 2018-10-08 RX ADMIN — FENTANYL CITRATE 25 MCG: 50 INJECTION, SOLUTION INTRAMUSCULAR; INTRAVENOUS at 11:45

## 2018-10-08 RX ADMIN — ROCURONIUM BROMIDE 20 MG: 10 INJECTION, SOLUTION INTRAVENOUS at 09:08

## 2018-10-08 RX ADMIN — Medication 80 MCG: at 09:55

## 2018-10-08 RX ADMIN — ROCURONIUM BROMIDE 10 MG: 10 INJECTION, SOLUTION INTRAVENOUS at 08:22

## 2018-10-08 RX ADMIN — SENNOSIDES AND DOCUSATE SODIUM 1 TABLET: 8.6; 5 TABLET ORAL at 18:51

## 2018-10-08 RX ADMIN — PROPOFOL 140 MG: 10 INJECTION, EMULSION INTRAVENOUS at 08:22

## 2018-10-08 RX ADMIN — DEXMEDETOMIDINE HYDROCHLORIDE 4 MCG: 4 INJECTION, SOLUTION INTRAVENOUS at 10:41

## 2018-10-08 RX ADMIN — SUCCINYLCHOLINE CHLORIDE 140 MG: 20 INJECTION INTRAMUSCULAR; INTRAVENOUS at 08:22

## 2018-10-08 RX ADMIN — TRANEXAMIC ACID 1 G: 100 INJECTION, SOLUTION INTRAVENOUS at 08:35

## 2018-10-08 RX ADMIN — ACETAMINOPHEN 1000 MG: 500 TABLET, FILM COATED ORAL at 14:29

## 2018-10-08 RX ADMIN — Medication 3 MG: at 21:56

## 2018-10-08 RX ADMIN — DEXMEDETOMIDINE HYDROCHLORIDE 4 MCG: 4 INJECTION, SOLUTION INTRAVENOUS at 10:38

## 2018-10-08 RX ADMIN — DEXMEDETOMIDINE HYDROCHLORIDE 4 MCG: 4 INJECTION, SOLUTION INTRAVENOUS at 10:42

## 2018-10-08 RX ADMIN — TRAMADOL HYDROCHLORIDE 50 MG: 50 TABLET, FILM COATED ORAL at 14:41

## 2018-10-08 RX ADMIN — KETOROLAC TROMETHAMINE 30 MG: 30 INJECTION, SOLUTION INTRAMUSCULAR at 14:29

## 2018-10-08 RX ADMIN — NEOSTIGMINE METHYLSULFATE 2 MG: 1 INJECTION INTRAVENOUS at 10:53

## 2018-10-08 RX ADMIN — LIDOCAINE HYDROCHLORIDE 50 MG: 20 INJECTION, SOLUTION EPIDURAL; INFILTRATION; INTRACAUDAL; PERINEURAL at 08:22

## 2018-10-08 RX ADMIN — DEXMEDETOMIDINE HYDROCHLORIDE 4 MCG: 4 INJECTION, SOLUTION INTRAVENOUS at 10:39

## 2018-10-08 RX ADMIN — DEXAMETHASONE SODIUM PHOSPHATE 4 MG: 4 INJECTION, SOLUTION INTRA-ARTICULAR; INTRALESIONAL; INTRAMUSCULAR; INTRAVENOUS; SOFT TISSUE at 08:29

## 2018-10-08 RX ADMIN — SODIUM CHLORIDE, SODIUM LACTATE, POTASSIUM CHLORIDE, CALCIUM CHLORIDE: 600; 310; 30; 20 INJECTION, SOLUTION INTRAVENOUS at 08:15

## 2018-10-08 RX ADMIN — Medication 2 G: at 08:30

## 2018-10-08 RX ADMIN — ACETAMINOPHEN 1000 MG: 500 TABLET, FILM COATED ORAL at 07:41

## 2018-10-08 RX ADMIN — INSULIN LISPRO 5 UNITS: 100 INJECTION, SOLUTION INTRAVENOUS; SUBCUTANEOUS at 18:51

## 2018-10-08 RX ADMIN — ROCURONIUM BROMIDE 30 MG: 10 INJECTION, SOLUTION INTRAVENOUS at 08:26

## 2018-10-08 RX ADMIN — Medication 80 MCG: at 09:45

## 2018-10-08 RX ADMIN — ACETAMINOPHEN 1000 MG: 500 TABLET, FILM COATED ORAL at 20:21

## 2018-10-08 RX ADMIN — KETOROLAC TROMETHAMINE 30 MG: 30 INJECTION, SOLUTION INTRAMUSCULAR at 20:21

## 2018-10-08 RX ADMIN — GLYCOPYRROLATE 0.4 MG: 0.2 INJECTION INTRAMUSCULAR; INTRAVENOUS at 10:53

## 2018-10-08 RX ADMIN — SODIUM CHLORIDE 125 ML/HR: 900 INJECTION, SOLUTION INTRAVENOUS at 11:25

## 2018-10-08 RX ADMIN — ALBUMIN HUMAN 250 ML: 50 SOLUTION INTRAVENOUS at 09:39

## 2018-10-08 RX ADMIN — HYDROMORPHONE HYDROCHLORIDE 0.25 MG: 2 INJECTION, SOLUTION INTRAMUSCULAR; INTRAVENOUS; SUBCUTANEOUS at 08:51

## 2018-10-08 RX ADMIN — NEOSTIGMINE METHYLSULFATE 2 MG: 1 INJECTION INTRAVENOUS at 10:38

## 2018-10-08 RX ADMIN — ROCURONIUM BROMIDE 20 MG: 10 INJECTION, SOLUTION INTRAVENOUS at 09:39

## 2018-10-08 RX ADMIN — Medication 80 MCG: at 10:56

## 2018-10-08 RX ADMIN — Medication 2 G: at 18:51

## 2018-10-08 NOTE — PROGRESS NOTES
Problem: Mobility Impaired (Adult and Pediatric)  Goal: *Acute Goals and Plan of Care (Insert Text)  Physical Therapy Goals  Initiated 10/8/2018    1. Patient will move from supine to sit and sit to supine , scoot up and down and roll side to side in bed with independence within 4 days. 2. Patient will perform sit to stand with modified independence within 4 days. 3. Patient will ambulate with modified independence for 150 feet with the least restrictive device within 4 days. 4. Patient will ascend/descend 2 stairs with no handrail(s) with modified independence within 4 days. 5. Patient will perform hip home exercise program per protocol with independence within 4 days. physical Therapy EVALUATION  Patient: Jero Hyde (34 y.o. female)  Date: 10/8/2018  Primary Diagnosis: OSTEOARTHRITIS BILATERAL HIP  Osteoarthritis of right hip  Procedure(s) (LRB):  RIGHT TOTAL HIP ARTHROPLASTY ANTERIOR APPROACH (Right) Day of Surgery   Precautions: fall       ASSESSMENT :  Based on the objective data described below, the patient presents with  decreased independence with mobility compared to baseline level prior to admission due to decreased strength, decreased ROM, and hypotension. Patient cleared by nursing for mobility and agreeable to participate in POD #0 mobility. Patient able to perform bed mobility and achieve EOB sitting with Miguel for RLE managment. Patient performed sit<>stand tx with Miguel and demonstrates even WB bilaterally and good immediate standing balance. Patient's BP dropped (see vitals below) and was symptomatic. Sidestepped toward head of bed and returned to sitting. Patient returned to supine position in bed. Patient did not attend pre operative joint replacement education class but has binder present. Physical therapist reviewed bed exercises and acute care expectations with patient. Patient has no additional questions. Patient needs rolling walker ordered for d/c.  Will continue to follow to progress towards acute care goals. Recommend HHPT at d/c to continue to optimize independence with mobility. Patient will benefit from skilled intervention to address the above impairments. Patients rehabilitation potential is considered to be Good  Factors which may influence rehabilitation potential include:   []         None noted  []         Mental ability/status  [x]         Medical condition  []         Home/family situation and support systems  []         Safety awareness  []         Pain tolerance/management  []         Other:      PLAN :  Recommendations and Planned Interventions:  [x]           Bed Mobility Training             []    Neuromuscular Re-Education  [x]           Transfer Training                   []    Orthotic/Prosthetic Training  [x]           Gait Training                         [x]    Modalities  [x]           Therapeutic Exercises           []    Edema Management/Control  [x]           Therapeutic Activities            [x]    Patient and Family Training/Education  []           Other (comment):    Frequency/Duration: Patient will be followed by physical therapy  twice daily to address goals. Discharge Recommendations: Home Health  Further Equipment Recommendations for Discharge: rolling walker     SUBJECTIVE:   Patient stated I'm going to camp on my couch so I don't have to do the stairs.     OBJECTIVE DATA SUMMARY:   HISTORY:    Past Medical History:   Diagnosis Date    Arthritis     degenerative & generalized    Chronic pain     right hip    Diabetes mellitus type 2, controlled ([address] 8/5/2015    Elevated liver enzymes     H/O bone density study 10/12    normal    H/O seasonal allergies     Hypercalcemia 5/8/2017    Hypertension     resolved last in May    Nausea & vomiting     Other and unspecified hyperlipidemia 8/5/2015    Psychiatric disorder     anxiety no meds     Past Surgical History:   Procedure Laterality Date    HX ABDOMINOPLASTY  2003    HX BREAST REDUCTION Bilateral 11/21/2017    BILATERAL BREAST REDUCTION performed by Niranjan Man MD at [address]  [facility]  [id number]    HX CYST REMOVAL Right 2008    ganglion - wrist    HX GI  11/2011    colonoscopy-normal-10 years-done in [address]    BTL    HX HEENT Right 2007    Lasik    HX ORTHOPAEDIC  06/21/2016    back surgery L3- L5 , screws and rods and plates    HX OTHER SURGICAL Right 1991    plantar wart of FOOT & 3th and 5th toe ? procedure    HX TONSILLECTOMY  1967     Prior Level of Function/Home Situation: Lives independently in a two story home. Works as a nurse. Personal factors and/or comorbidities impacting plan of care: Blood pressure    Home Situation  Home Environment: Private residence  # Steps to Enter: 2  Rails to Enter: No  Hand Rails : Right  One/Two Story Residence: Two story, live on 1st floor  # of Interior Steps: 13  Height of Each Step (in): 12 inches  Interior Rails: Right  Lift Chair Available: No  Living Alone: Yes  Support Systems: Child(erin)  Patient Expects to be Discharged to[de-identified] Private residence  Current DME Used/Available at Home: None  Tub or Shower Type: Tub/Shower combination    EXAMINATION/PRESENTATION/DECISION MAKING:   Critical Behavior:              Hearing:   Auditory  Auditory Impairment: None  Skin:    Edema:   Range Of Motion:  AROM: Generally decreased, functional           PROM: Generally decreased, functional           Strength:    Strength: Generally decreased, functional                    Tone & Sensation:   Tone: Normal              Sensation: Intact               Coordination:  Coordination: Grossly decreased, non-functional  Vision:      Functional Mobility:  Bed Mobility:     Supine to Sit: Minimum assistance (RLE managment)  Sit to Supine: Minimum assistance (RLE managment)     Transfers:  Sit to Stand: Minimum assistance  Stand to Sit: Minimum assistance                       Balance:   Sitting: Intact  Ambulation/Gait Training:                                                        Therapeutic Exercises:       Functional Measure:  Tinetti test:    Sitting Balance: 1  Arises: 0  Attempts to Rise: 1  Immediate Standing Balance: 1  Standing Balance: 1  Nudged: 0  Eyes Closed: 0  Turn 360 Degrees - Continuous/Discontinuous: 0  Turn 360 Degrees - Steady/Unsteady: 0  Sitting Down: 0  Balance Score: 4  Indication of Gait: 0  R Step Length/Height: 0  L Step Length/Height: 0  R Foot Clearance: 0  L Foot Clearance: 0  Step Symmetry: 0  Step Continuity: 0  Path: 0  Trunk: 0  Walking Time: 0  Gait Score: 0  Total Score: 4       Tinetti Test and G-code impairment scale:  Percentage of Impairment CH    0%   CI    1-19% CJ    20-39% CK    40-59% CL    60-79% CM    80-99% CN     100%   Tinetti  Score 0-28 28 23-27 17-22 12-16 6-11 1-5 0       Tinetti Tool Score Risk of Falls  <19 = High Fall Risk  19-24 = Moderate Fall Risk  25-28 = Low Fall Risk  Tinetti ME. Performance-Oriented Assessment of Mobility Problems in Elderly Patients. [address] 64; [id number]. (Scoring Description: PT Bulletin Feb. 10, 1993)    Older adults: Iveth Ballesteros et al, 2009; n = 1000 [facility] elderly evaluated with ABC, BLUE, ADL, and IADL)  · Mean BLUE score for males aged 69-68 years = 26.21(3.40)  · Mean BLUE score for females age 69-68 years = 25.16(4.30)  · Mean BLUE score for males over 80 years = 23.29(6.02)  · Mean BLUE score for females over 80 years = 17.20(8.32)         G codes: In compliance with CMSs Claims Based Outcome Reporting, the following G-code set was chosen for this patient based on their primary functional limitation being treated: The outcome measure chosen to determine the severity of the functional limitation was the Tinetti with a score of 4/28 which was correlated with the impairment scale.     ? Mobility - Walking and Moving Around:     - CURRENT STATUS: CM - 80%-99% impaired, limited or restricted    - GOAL STATUS: CL - 60%-79% impaired, limited or restricted    - D/C STATUS:  ---------------To be determined---------------      Physical Therapy Evaluation Charge Determination   History Examination Presentation Decision-Making   HIGH Complexity :3+ comorbidities / personal factors will impact the outcome/ POC  HIGH Complexity : 4+ Standardized tests and measures addressing body structure, function, activity limitation and / or participation in recreation  MEDIUM Complexity : Evolving with changing characteristics  LOW Complexity : FOTO score of       Based on the above components, the patient evaluation is determined to be of the following complexity level: LOW     Pain:  Pain Scale 1: Numeric (0 - 10)  Pain Intensity 1: 5  Pain Location 1: Hip  Pain Orientation 1: Right  Pain Description 1: Other (comment) (\"STIFFNESS\")  Pain Intervention(s) 1: Medication (see MAR); Ice  Activity Tolerance:   BP supine: 144/68  BP standin/78 with dizziness  Please refer to the flowsheet for vital signs taken during this treatment. After treatment:   []         Patient left in no apparent distress sitting up in chair  [x]         Patient left in no apparent distress in bed  [x]         Call bell left within reach  []         Nursing notified  []         Caregiver present  []         Bed alarm activated    COMMUNICATION/EDUCATION:   The patients plan of care was discussed with: Registered Nurse. [x]         Fall prevention education was provided and the patient/caregiver indicated understanding. [x]         Patient/family have participated as able in goal setting and plan of care. [x]         Patient/family agree to work toward stated goals and plan of care. []         Patient understands intent and goals of therapy, but is neutral about his/her participation. []         Patient is unable to participate in goal setting and plan of care.     Thank you for this referral.  Dorthula Cooks, PT, DPT   Time Calculation: 23 mins

## 2018-10-08 NOTE — PERIOP NOTES
TRANSFER - OUT REPORT:    Verbal report given to NURSE(name) on Olman Lowery  being transferred to [facility](unit) for routine post - op       Report consisted of patients Situation, Background, Assessment and   Recommendations(SBAR). Time Pre op antibiotic given:DEYSI 2 [email or web address]  Anesthesia Stop time: 6724  Arreguin Present on Transfer to floor:YES  Order for Arreguin on Chart:YES  Discharge Prescriptions with Chart:NO    Information from the following report(s) SBAR, OR Summary, Intake/Output, MAR, Recent Results and Med Rec Status was reviewed with the receiving nurse. Opportunity for questions and clarification was provided. Is the patient on 02? YES       L/Min 2      Is the patient on a monitor? NO    Is the nurse transporting with the patient? NO    Surgical Waiting Area notified of patient's transfer from PACU?  YES      The following personal items collected during your admission accompanied patient upon transfer:   Dental Appliance: Dental Appliances: None  Vision:    Hearing Aid:    Jewelry:    Clothing:    Other Valuables:    Valuables sent to safe:

## 2018-10-08 NOTE — PROGRESS NOTES
Ortho Daily Progress Note    10/8/2018  1:45 PM    POD:  Day of Surgery  S/P:  Procedure(s):  RIGHT TOTAL HIP ARTHROPLASTY ANTERIOR APPROACH    Afebrile/VSS, NAD, A&O x 3  Doing well without complaints of nausea  Pain well controlled  Calves soft/NTTP Bilaterally  Moving lower extremities well. Neurocirculatory exam intact and within normal range. Lab Results   Component Value Date/Time    HGB 13.2 09/28/2018 11:38 AM    INR 1.0 09/28/2018 11:38 AM     Recent Labs      09/28/18   1138  07/17/18   0854  02/26/18   0900   CREA  0.66  0.75  0.66   BUN  14  14  15     Estimated Creatinine Clearance: 90.7 mL/min (based on Cr of 0.66).     PLAN: insulin sliding scale for diabetes management   DVT prophylaxis: Xarelto for 30 days post op  WBAT with PT-mobilization  Pain Control: oxycodone   Plan to D/C home 2 days with HH/PT      ALISON Berry

## 2018-10-08 NOTE — OP NOTES
OPERATIVE REPORT  RIGHT TOTAL HIP REPLACEMENT (ANTERIOR APPROACH)    NAME: Tracy Dominguez  MRN: [id number]  :  1960  AGE: 59 y.o. DATE OF SURGERY:  10/8/2018    PREOPERATIVE DIAGNOSIS: Severe degenerative joint disease, right hip. POSTOPERATIVE DIAGNOSIS: Severe degenerative joint disease, right hip. PROCEDURES PERFORMED: Right total hip replacement - Anterior approach    SURGEON: Papo Josue MD.    Wood Watson PA-C    ANESTHESIA: General    ESTIMATED BLOOD LOSS: 125 mL. DRAINS: None. COMPLICATIONS: None. SPECIMENS REMOVED: None. PRE-OP ANTIBIOTIC: Ancef 2 gram    IMPLANT:   Implant Name Type Inv. Item Serial No.  Lot No. LRB No. Used Action   COVER HOLE CENTRAL THREADED - SNA [employer] COVER HOLE CENTRAL THREADED [facility] [id number] Right 1 Implanted   SHELL ACET 3H R3 MM48 -- REFLECTION - SNA  SHELL ACET 3H R3 MM48 -- REFLECTION NA [employer] [id number] Right 1 Implanted   SCREW HEAD SPHERICAL CANCELLOUS 30MM - SNA [employer] SCREW HEAD SPHERICAL CANCELLOUS 30MM NA LEDESMA [facility] NEPH WOUND CARE [id number] Right 1 Implanted   SCREW HEAD SPHERICAL CANCELLOUS 20MM - SNA Screw SCREW HEAD SPHERICAL CANCELLOUS 20MM [facility] [id number] Right 1 Implanted   LINER ACET XLPE 0D R3 [id number] --  - SNA  LINER ACET XLPE 0D R3 [id number] --  NA [employer] NEPHEW ORTHOPEDIC [id number] Right 1 Implanted   POLARSTEM, STD, size 0 Joint Component  NA [employer] ORTH[employer]IC [id number] Right 1 Implanted   HEAD FEMORAL [id number]/14 32MM +4 - SNA Joint Component HEAD FEMORAL [id number]/14 32MM +4 NA LEDESMA T NEPH WOUND CARE [id number] Right 1 Implanted       INDICATIONS: 59 yrs female  with severe DJD of the right hip. The patient's right hip has been progressive in terms of symptoms. The patient now has severe activity limitation.  The patient has continued with conservative management without adequate relief or improvement of functional limitations. We discussed options and she wished to proceed with right total hip replacement. The patient has continued with conservative management without adequate relief or improvement of functional limitations. DESCRIPTION OF PROCEDURE: Anesthetic was initiated. Preoperative dose of IV  Ancef was given. Arreguin catheter was placed. The right side was  confirmed as the operative side, prepped and draped in the usual sterile fashion. Skin was covered with Ioban occlusive dressing. Direct anterior exposure was made to the patient's hip through the sartorius tensor interval. Anterior hip vasculature was cauterized. Retractors were taken out to observe for bleeding and there was none. The capsule was identified, opened and T'd distally. The femoral neck was osteotomized. Femoral head was removed from the acetabulum, which was exposed and soft tissues were removed. The acetabulum was progressively  reamed to 47 and a 48 trial shell was impacted with good press-fit. This was removed and a 48 S&N shell was impacted in the acetabulum in 40 degrees of abduction in an anatomic-type anteversion. Bone spurs were removed and 6.5 screws x2 were placed. The polyethylene liner was placed. Femur was positioned and elevated from the wound. The medullary canal was entered. Broached to a size 0/1. Calcar planed and then trialed. A +4 hip ball was the most appropriate for leg length and tension with a standard offset stem. The hip was dislocated. The anterior greater trochanter was trimmed down to enhance flexion, rotation and stability. The trial was removed and the real stem was impacted. The real hip ball was placed. The hip was reduced. After copious irrigation, the capsule was closed with #2 Vicryl sutures. I irrigated the skin, subcutaneous and deep wound. I closed the fascia of the tensor fascia jovany with #2 Vicryl sutures. Skin and subcutaneous were irrigated.  Soft tissues were infiltrated with local anesthetic. Skin and subcutaneous were closed in a standard fashion. Sterile dressing was applied. There were no complications. No specimen was sent. The procedure was a RIGHT TOTAL HIP REPLACEMENT using a S&N total hip construct. Riki Petty PA-C was of vital assistance throughout the duration of the procedure. The patient was transferred to the recovery room in stable condition.

## 2018-10-08 NOTE — ANESTHESIA PREPROCEDURE EVALUATION
Anesthetic History     PONV          Review of Systems / Medical History  Patient summary reviewed, nursing notes reviewed and pertinent labs reviewed    Pulmonary                   Neuro/Psych         Psychiatric history    Comments: Chronic insomnia  Anxiety/depression Cardiovascular    Hypertension: well controlled          Hyperlipidemia    Exercise tolerance: >4 METS     GI/Hepatic/Renal                Endo/Other    Diabetes: well controlled    Obesity and arthritis     Other Findings              Physical Exam    Airway  Mallampati: II      Mouth opening: Normal     Cardiovascular    Rhythm: regular  Rate: normal         Dental  No notable dental hx       Pulmonary  Breath sounds clear to auscultation               Abdominal         Other Findings            Anesthetic Plan    ASA: 3  Anesthesia type: general          Induction: Intravenous  Anesthetic plan and risks discussed with: Patient      Informed consent obtained.

## 2018-10-08 NOTE — IP AVS SNAPSHOT
110 [facility] [place] [address] [place] [address] 
[phone] Patient: Stewart Ivan MRN: [id number] About your hospitalization You were admitted on:  October 8, 2018 You last received care in the:  [address] [facility] You were discharged on:  October 10, 2018 Why you were hospitalized Your primary diagnosis was:  Not on File Your diagnoses also included:  Osteoarthritis Of Right Hip Follow-up Information Follow up With Details Comments Contact Info AT [facility] [address] [phone] Radha Hong MD   330 [facility] [address] [place] [address] 
[phone] Discharge Orders None A check connor indicates which time of day the medication should be taken. My Medications START taking these medications Instructions Each Dose to Equal  
 Morning Noon Evening Bedtime  
 cyclobenzaprine 10 mg tablet Commonly known as:  FLEXERIL Your last dose was: Your next dose is: Take 0.5 Tabs by mouth three (3) times daily as needed for Muscle Spasm(s). Indications: Muscle Spasm 5 mg  
    
   
   
   
  
 rivaroxaban 10 mg tablet Commonly known as:  Emeli Sales Your last dose was: Your next dose is: Take 1 Tab by mouth daily (with lunch). 10 mg  
    
   
   
   
  
 senna-docusate 8.6-50 mg per tablet Commonly known as:  Kelly Paez Your last dose was: Your next dose is: Take 1 Tab by mouth two (2) times a day. 1 Tab  
    
   
   
   
  
 traMADol 50 mg tablet Commonly known as:  ULTRAM  
   
Your last dose was: Your next dose is: Take 1 Tab by mouth every six (6) hours as needed. Max Daily Amount: 200 mg.  
 50 mg CONTINUE taking these medications  Instructions Each Dose to Equal  
 Morning Noon Evening Bedtime  
 aspirin delayed-release 81 mg tablet Your last dose was: Your next dose is: Take  by mouth daily. Blood-Glucose Meter monitoring kit Your last dose was: Your next dose is:    
   
   
 Use as directed. Dx: E11.9 CLARITIN 10 mg tablet Generic drug:  loratadine Your last dose was: Your next dose is: Take 10 mg by mouth daily. 10 mg  
    
   
   
   
  
 CO Q-10 10 mg Cap Generic drug:  coenzyme q10 Your last dose was: Your next dose is: Take 100 mg by mouth daily. 100 mg  
    
   
   
   
  
 EMERGEN-C PO Your last dose was: Your next dose is: Take 1,000 mg by mouth daily. 1000 mg  
    
   
   
   
  
 glucose blood VI test strips strip Commonly known as:  ASCENSIA AUTODISC VI, ONE TOUCH ULTRA TEST VI Your last dose was: Your next dose is:    
   
   
 Use to check blood sugar once daily. * hydroCHLOROthiazide 25 mg tablet Commonly known as:  HYDRODIURIL Your last dose was: Your next dose is: Take 1 Tab by mouth daily. 25 mg  
    
   
   
   
  
 * hydroCHLOROthiazide 25 mg tablet Commonly known as:  HYDRODIURIL Your last dose was: Your next dose is: TAKE ONE TABLET BY MOUTH DAILY * Lancets Misc Your last dose was: Your next dose is:    
   
   
 Use as directed. Dx: E11.9  
     
   
   
   
  
 * ONETOUCH DELICA LANCETS 30 gauge Misc Generic drug:  lancets Your last dose was: Your next dose is: LUTEIN PO Your last dose was: Your next dose is: Take 25 mcg by mouth daily. 25 mcg  
    
   
   
   
  
 magnesium 250 mg Tab Your last dose was: Your next dose is: Take 400 mg by mouth daily.   
 400 mg  
 melatonin 3 mg tablet Your last dose was: Your next dose is: Take 3 mg by mouth nightly. 3 mg  
    
   
   
   
  
 metFORMIN  mg tablet Commonly known as:  GLUCOPHAGE XR Your last dose was: Your next dose is: TAKE ONE TABLET BY MOUTH TWICE A DAY  
     
   
   
   
  
 NASACORT 55 mcg nasal inhaler Generic drug:  triamcinolone Your last dose was: Your next dose is: 2 Sprays nightly. 2 [place] OMEGA 3-6-9 1,200 mg Cap Generic drug:  fish,bora,flax oils-om3,6,9no1 Your last dose was: Your next dose is: Take  by mouth. OTHER Your last dose was: Your next dose is:    
   
   
 bromium 500 mg every morning OTHER Your last dose was: Your next dose is:    
   
   
 Berta daily (500 mg) potassium 99 mg tablet Your last dose was: Your next dose is: Take 99 mg by mouth every other day. 99 mg PROBIOTIC 4X 10-15 mg Tbec Generic drug:  B.infantis-B.ani-B.long-B.bifi Your last dose was: Your next dose is: Take  by mouth daily. TURMERIC PO Your last dose was: Your next dose is: Take 500 mg by mouth daily. 500 mg  
    
   
   
   
  
 zinc 50 mg Tab tablet Your last dose was: Your next dose is: Take 50 mg by mouth daily. 50 mg  
    
   
   
   
  
 * Notice: This list has 4 medication(s) that are the same as other medications prescribed for you. Read the directions carefully, and ask your doctor or other care provider to review them with you. STOP taking these medications OTHER Where to Get Your Medications Information on where to get these meds will be given to you by the nurse or doctor. ! Ask your nurse or doctor about these medications  
  cyclobenzaprine 10 mg tablet  
 rivaroxaban 10 mg tablet  
 senna-docusate 8.6-50 mg per tablet  
 traMADol 50 mg tablet Opioid Education Prescription Opioids: What You Need to Know: 
 
Prescription opioids can be used to help relieve moderate-to-severe pain and are often prescribed following a surgery or injury, or for certain health conditions. These medications can be an important part of treatment but also come with serious risks. Opioids are strong pain medicines. Examples include hydrocodone, oxycodone, fentanyl, and morphine. Heroin is an example of an illegal opioid. It is important to work with your health care provider to make sure you are getting the safest, most effective care. WHAT ARE THE RISKS AND SIDE EFFECTS OF OPIOID USE? Prescription opioids carry serious risks of addiction and overdose, especially with prolonged use. An opioid overdose, often marked by slow breathing, can cause sudden death. The use of prescription opioids can have a number of side effects as well, even when taken as directed. · Tolerance-meaning you might need to take more of a medication for the same pain relief · Physical dependence-meaning you have symptoms of withdrawal when the medication is stopped. Withdrawal symptoms can include nausea, sweating, chills, diarrhea, stomach cramps, and muscle aches. Withdrawal can last up to several weeks, depending on which drug you took and how long you took it. · Increased sensitivity to pain · Constipation · Nausea, vomiting, and dry mouth · Sleepiness and dizziness · Confusion · Depression · Low levels of testosterone that can result in lower sex drive, energy, and strength · Itching and sweating RISKS ARE GREATER WITH:      
· History of drug misuse, substance use disorder, or overdose · Mental health conditions (such as depression or anxiety) · Sleep apnea · Older age (72 years or older) · Pregnancy Avoid alcohol while taking prescription opioids. Also, unless specifically advised by your health care provider, medications to avoid include: · Benzodiazepines (such as Xanax or Valium) · Muscle relaxants (such as Soma or Flexeril) · Hypnotics (such as Ambien or Lunesta) · Other prescription opioids KNOW YOUR OPTIONS Talk to your health care provider about ways to manage your pain that don't involve prescription opioids. Some of these options may actually work better and have fewer risks and side effects. Consult your physician before adding or stopping any medications, treatments, or physical activity. Options may include: 
· Pain relievers such as acetaminophen, ibuprofen, and naproxen · Some medications that are also used for depression or seizures · Physical therapy and exercise · Counseling to help patients learn how to cope better with triggers of pain and stress. · Application of heat or cold compress · Massage therapy · Relaxation techniques Be Informed Make sure you know the name of your medication, how much and how often to take it, and its potential risks & side effects. IF YOU ARE PRESCRIBED OPIOIDS FOR PAIN: 
· Never take opioids in greater amounts or more often than prescribed. Remember the goal is not to be pain-free but to manage your pain at a tolerable level. · Follow up with your primary care provider to: · Work together to create a plan on how to manage your pain. · Talk about ways to help manage your pain that don't involve prescription opioids. · Talk about any and all concerns and side effects. · Help prevent misuse and abuse. · Never sell or share prescription opioids · Help prevent misuse and abuse. · Store prescription opioids in a secure place and out of reach of others (this may include visitors, children, friends, and family).  
· Safely dispose of unused/unwanted prescription opioids: Find your community drug take-back program or your pharmacy mail-back program, or flush them down the toilet, following guidance from the Food and Drug Administration (www.fda.gov/Drugs/ResourcesForYou). · Visit www.cdc.gov/drugoverdose to learn about the risks of opioid abuse and overdose. · If you believe you may be struggling with addiction, tell your health care provider and ask for guidance or call [employer] at 1-414-478-LTWU. Discharge Instructions After Hospital Care Plan:  Discharge Instructions Anterior Approach Hip Replacement-Dr. Soledad Lebron Patient Bernardino Dove Date of procedure:10/8/2018 Procedure:Procedure(s): RIGHT TOTAL HIP ARTHROPLASTY ANTERIOR APPROACH Surgeon:Surgeon(s) and Role: Erika Thibodeaux MD - Primary PCP: Inés Babcock MD 
Date of discharge: 10/10/2018 Follow up appointments 
-follow up with Dr. Soledad Lebron in 3 weeks. Call [phone] to make an appointment. [facility] Agency: _______________________   phone: _____________________ The agency will contact you to arrange dates/times for visits. Please call them if you do not hear from them within 24 hours after you are discharged When to call your Orthopaedic Surgeon: 
-Signs of hip dislocation-increased hip pain, unrelieved pain or if you have difficulty or are unable to walk 
-Signs of infection-if your incision is red; continues to have drainage; drainage has a foul odor or if you have a persistent fever over 101 degrees 
-Signs of a blood clot in your leg-calf pain, tenderness, redness, swelling of lower leg When to call your Primary Care Physician: 
-Concerns about medical conditions such as diabetes, high blood pressure, asthma, congestive heart failure 
-Call if blood sugars are elevated, persistent headache or dizziness, coughing or congestion, constipation or diarrhea, burning with urination, abnormal heart rate 
 
When to call 911and go to the nearest emergency room 
-acute onset of chest pain, shortness of breath, difficulty breathing Activity 
- weight bearing as tolerated with walker or crutches. Refer to pages 26-36 of your handbook for instructions and pictures 
-20 repetitions of each exercise 3 times each day as instructed by the physical therapist.  Refer to pages 38-45 of our handbook for instructions and pictures 
-get up every one hour and walk (except at night when sleeping) -Avoid extreme movement of hip to prevent dislocation 
-Do not drive or operate heavy machinery. Incision Care 
-the Aquacel (brown, waterproof) surgical dressing is to remain on your hip for 7 days. On the 7th day have someone gently peel the dressing off by carefully lifting the edge and stretching it slightly to break the adhesive seal and leave incision open to air. You may take a shower with the Aquacel dressing in place. Preventing blood clots 
-take Xarelto as prescribed by Dr. Rakan Perez for one month following surgery 
-wear elastic stockings (TEDS) for 4 weeks. You may remove them for approximately 1 hour daily for showering/sponge bathing Pain management 
-take Tramadol for pain as prescribed; decrease the amount you use as your pain lessens 
-avoid alcoholic beverages while taking pain medication 
-do not take any over-the-counter medication except for Tylenol (acetaminophen) 
-Please be aware that many medications contain Tylenol. We do not want you to over medicate so please read the information below as a guide. Do not take more than 4 Grams of Tylenol in a 24 hour period. (There are 1000 milligrams in one Gram) -You may place an ice bag on your hip for 15-20 minutes after exercising and as needed throughout the day and night Diet 
-resume usual diet; drink plenty of fluids; eat foods high in fiber 
-you may want to take a stool softener (such as Senokot-S or Colace) to prevent constipation while you are taking pain medication. If constipation occurs, take a laxative (such as Dulcolax tablets, Milk of Magnesia, or a suppository) Home Health Care Protocol (to be followed by [address]) Nursing-per physicians order -Draw a PT/INR per physicians order and call results to Dr. Alis Francis at [phone], extension [phone]  
-remove Aquacel dressing at 7 days post-op 
-complete head to toe assessment, vital signs 
-medication reconciliation 
-review pain management 
-manage chronic medical conditions Physical Therapy-per physicians order Weight bearing status: 
  
Left Side Weight Bearing: Full Right Side Weight Bearing: As tolerated Mobility Status: 
Supine to Sit: Supervision, Adaptive equipment (gait belt for RLE) Sit to Stand: Stand-by assistance Sit to Supine: Supervision, Adaptive equipment Gait: 
Distance (ft): 150 Feet (ft) Ambulation - Level of Assistance: Stand-by assistance Assistive Device: Gait belt, Walker, rolling Gait Abnormalities: Antalgic, Decreased step clearance ADL status overall composite: 
  
  
  
  
  
 
Physical Therapy 
-assessment and evaluation-bed mobility; functional transfers (bed, chair, bathroom, stairs); ambulation with equipment, car transfers, safety and ability to get out of house in the event of an emergency 
-review and maintain weight bearing as tolerated; avoid extreme movement of hip to prevent hip dislocation 
-discuss pain management 
-review how to do ADLs. Refer to pages 46-50 of handbook 
 
-Home Exercise Program-refer to pages 38-45 of handbook 
-supine exercises-quad sets, hamstring sets, gluteal sets, hip abduction/adduction slides, hip external rotation, heel slides (flexing the knee) -sitting exercises-ankle pumps, bicep curls (use weights as appropriate), triceps pushups, shoulder flexion (use weights as appropriate) -standing exercises holding onto supportive counter-toe raises, heel raises, mini-squats, heel/toe touches with knee bend, marching in place, hip abduction/adduction, hip external rotation, hip extension, hip abduction/extension/external rotation (concentration on gluteus shane), heel toe taps Physical therapy goals by discharge from [address] [place] [address] Drive ambulation with walker, crutches or cane if needed (level surfaces and   stairs) 
-Daily performance of home exercise program 
-Independent with stair climbing and car transfers 
-Progress to community ambulator (least restrictive assistive device) Introducing [facility]! Dear Tracy Trujillo: Thank you for requesting a [employer] account. Our records indicate that you already have an active [employer] account. You can access your account anytime at [email or web address] Did you know that you can access your hospital and ER discharge instructions at any time in [employer]? You can also review all of your test results from your hospital stay or ER visit. Additional Information If you have questions, please visit the Frequently Asked Questions section of the [employer] website at [email or web address] Remember, [employer] is NOT to be used for urgent needs. For medical emergencies, dial 911. Now available from your iPhone and Android! Introducing Iftikhar Monterroso As a [facility] patient, I wanted to make you aware of our electronic visit tool called Iftikhar Rafaeldutchambar. [facility] 24/7 allows you to connect within minutes with a medical provider 24 hours a day, seven days a week via a mobile device or tablet or logging into a secure website from your computer. You can access Iftikhar Monterroso from anywhere in the [place].  
 
A virtual visit might be right for you when you have a simple condition and feel like you just dont want to get out of bed, or cant get away from work for an appointment, when your regular [facility] provider is not available (evenings, weekends or holidays), or when youre out [facility] and need minor care. Electronic visits cost only $49 and if the [employer] provider determines a prescription is needed to treat your condition, one can be electronically transmitted to a nearby pharmacy*. Please take a moment to enroll today if you have not already done so. The enrollment process is free and takes just a few minutes. To enroll, please download the [employer] maya to your tablet or phone, or visit [email or web address] to enroll on your computer. And, as an [address] [place] [address] patient with a everbill account, the results of your visits will be scanned into your electronic medical record and your primary care provider will be able to view the scanned results. We urge you to continue to [facility] for your ongoing medical care. And while your primary care provider may not be the one available when you seek a Respi virtual visit, the peace of mind you get from getting a real diagnosis real time can be priceless. For more information on Respi, view our Frequently Asked Questions (FAQs) at [email or web address]. Sincerely, 
 
Aleyda Mojica MD 
Chief Medical Officer [facility] Marquita Olvin *:  certain medications cannot be prescribed via Respi Providers Seen During Your Hospitalization Provider Specialty Primary office phone Sony Reyna MD Orthopedic Surgery [phone] Your Primary Care Physician (PCP) Primary Care Physician Office Phone Office Fax Yan Bentley [phone] You are allergic to the following Allergen Reactions Ceclor (Cefaclor) Rash Twylla Moritz Sulfa (Sulfonamide Antibiotics) Anaphylaxis Shortness of Breath Rash Invokana (Canagliflozin) Other (comments) Alopecia and yeast   
    
 Micardis (Telmisartan) Myalgia Leg muscle pain - subsided with discontinuation Azithromycin Nausea Only Pt states she is allergic to all mycin drugs; GI upset Ciprofloxacin Other (comments) Headache Lexapro (Escitalopram) Other (comments) Pt states she had arm pains and vision changes. Losartan Cough Other Medication Other (comments) DERMABOND GLUE, HAD BREAST REDUCTION AND WOUND ALMOST DEHISCED. Tetracycline Other (comments) Stomach issues Zyrtec (Cetirizine) Other (comments) Pt states that it makes her heart race Recent Documentation Height Weight BMI OB Status Smoking Status 1.6 m 79.8 kg 31.18 kg/m2 Postmenopausal Never Smoker Emergency Contacts Name Discharge Info Relation Home Work Mobile [facility] DISCHARGE CAREGIVER [3] Child [2] [phone] Samira Drilling  Child [2] [phone] Patient Belongings The following personal items are in your possession at time of discharge: 
  Dental Appliances: None  Visual Aid: None Please provide this summary of care documentation to your next provider. Signatures-by signing, you are acknowledging that this After Visit Summary has been reviewed with you and you have received a copy. Patient Signature:  ____________________________________________________________ Date:  ____________________________________________________________  
  
Bufyf Figures Provider Signature:  ____________________________________________________________ Date:  ____________________________________________________________

## 2018-10-08 NOTE — PROGRESS NOTES
The following herbal, alternative, and/or nutritional/dietary supplement product(s) has been discontinued  per [facility] approved policy:      coenzyme V34 (CO Q-10) 10 mg cap, Take 100 mg by mouth daily. Please reorder upon discharge if appropriate.

## 2018-10-09 LAB
ANION GAP SERPL CALC-SCNC: 11 MMOL/L (ref 5–15)
BUN SERPL-MCNC: 11 MG/DL (ref 6–20)
BUN/CREAT SERPL: 13 (ref 12–20)
CALCIUM SERPL-MCNC: 8.2 MG/DL (ref 8.5–10.1)
CHLORIDE SERPL-SCNC: 107 MMOL/L (ref 97–108)
CO2 SERPL-SCNC: 24 MMOL/L (ref 21–32)
CREAT SERPL-MCNC: 0.82 MG/DL (ref 0.55–1.02)
GLUCOSE BLD STRIP.AUTO-MCNC: 130 MG/DL (ref 65–100)
GLUCOSE BLD STRIP.AUTO-MCNC: 146 MG/DL (ref 65–100)
GLUCOSE BLD STRIP.AUTO-MCNC: 157 MG/DL (ref 65–100)
GLUCOSE BLD STRIP.AUTO-MCNC: 164 MG/DL (ref 65–100)
GLUCOSE SERPL-MCNC: 162 MG/DL (ref 65–100)
HGB BLD-MCNC: 8.3 G/DL (ref 11.5–16)
POTASSIUM SERPL-SCNC: 3.7 MMOL/L (ref 3.5–5.1)
SERVICE CMNT-IMP: ABNORMAL
SODIUM SERPL-SCNC: 142 MMOL/L (ref 136–145)

## 2018-10-09 PROCEDURE — 97165 OT EVAL LOW COMPLEX 30 MIN: CPT

## 2018-10-09 PROCEDURE — 65270000029 HC RM PRIVATE

## 2018-10-09 PROCEDURE — 85018 HEMOGLOBIN: CPT | Performed by: PHYSICIAN ASSISTANT

## 2018-10-09 PROCEDURE — 74011250637 HC RX REV CODE- 250/637: Performed by: ORTHOPAEDIC SURGERY

## 2018-10-09 PROCEDURE — 77030027138 HC INCENT SPIROMETER -A

## 2018-10-09 PROCEDURE — 97116 GAIT TRAINING THERAPY: CPT

## 2018-10-09 PROCEDURE — 74011000250 HC RX REV CODE- 250: Performed by: PHYSICIAN ASSISTANT

## 2018-10-09 PROCEDURE — 80048 BASIC METABOLIC PNL TOTAL CA: CPT | Performed by: PHYSICIAN ASSISTANT

## 2018-10-09 PROCEDURE — 74011250636 HC RX REV CODE- 250/636: Performed by: PHYSICIAN ASSISTANT

## 2018-10-09 PROCEDURE — 97530 THERAPEUTIC ACTIVITIES: CPT

## 2018-10-09 PROCEDURE — 97535 SELF CARE MNGMENT TRAINING: CPT

## 2018-10-09 PROCEDURE — 36415 COLL VENOUS BLD VENIPUNCTURE: CPT | Performed by: PHYSICIAN ASSISTANT

## 2018-10-09 PROCEDURE — 82962 GLUCOSE BLOOD TEST: CPT

## 2018-10-09 PROCEDURE — 74011250637 HC RX REV CODE- 250/637: Performed by: PHYSICIAN ASSISTANT

## 2018-10-09 PROCEDURE — 74011636637 HC RX REV CODE- 636/637: Performed by: PHYSICIAN ASSISTANT

## 2018-10-09 RX ORDER — ACETAMINOPHEN 325 MG/1
650 TABLET ORAL EVERY 6 HOURS
Status: DISCONTINUED | OUTPATIENT
Start: 2018-10-09 | End: 2018-10-10 | Stop reason: HOSPADM

## 2018-10-09 RX ADMIN — Medication 2 G: at 00:52

## 2018-10-09 RX ADMIN — SENNOSIDES AND DOCUSATE SODIUM 1 TABLET: 8.6; 5 TABLET ORAL at 19:27

## 2018-10-09 RX ADMIN — ACETAMINOPHEN 1000 MG: 500 TABLET, FILM COATED ORAL at 01:56

## 2018-10-09 RX ADMIN — RIVAROXABAN 10 MG: 10 TABLET, FILM COATED ORAL at 12:20

## 2018-10-09 RX ADMIN — ACETAMINOPHEN 650 MG: 325 TABLET ORAL at 21:38

## 2018-10-09 RX ADMIN — KETOROLAC TROMETHAMINE 30 MG: 30 INJECTION, SOLUTION INTRAMUSCULAR at 06:45

## 2018-10-09 RX ADMIN — LORATADINE 10 MG: 10 TABLET ORAL at 10:18

## 2018-10-09 RX ADMIN — TRAMADOL HYDROCHLORIDE 50 MG: 50 TABLET, FILM COATED ORAL at 17:23

## 2018-10-09 RX ADMIN — HYDROCHLOROTHIAZIDE 25 MG: 25 TABLET ORAL at 10:14

## 2018-10-09 RX ADMIN — ACETAMINOPHEN 500 MG: 500 TABLET, FILM COATED ORAL at 10:12

## 2018-10-09 RX ADMIN — POTASSIUM CHLORIDE 10 MEQ: 750 TABLET, FILM COATED, EXTENDED RELEASE ORAL at 10:13

## 2018-10-09 RX ADMIN — Medication 1 CAPSULE: at 10:14

## 2018-10-09 RX ADMIN — Medication 3 MG: at 21:38

## 2018-10-09 RX ADMIN — INSULIN LISPRO 2 UNITS: 100 INJECTION, SOLUTION INTRAVENOUS; SUBCUTANEOUS at 12:19

## 2018-10-09 RX ADMIN — SODIUM CHLORIDE 125 ML/HR: 900 INJECTION, SOLUTION INTRAVENOUS at 06:46

## 2018-10-09 RX ADMIN — TRAMADOL HYDROCHLORIDE 50 MG: 50 TABLET, FILM COATED ORAL at 10:15

## 2018-10-09 RX ADMIN — KETOROLAC TROMETHAMINE 30 MG: 30 INJECTION, SOLUTION INTRAMUSCULAR at 01:56

## 2018-10-09 RX ADMIN — POLYETHYLENE GLYCOL 3350 17 G: 17 POWDER, FOR SOLUTION ORAL at 10:15

## 2018-10-09 RX ADMIN — ACETAMINOPHEN 650 MG: 325 TABLET ORAL at 17:23

## 2018-10-09 RX ADMIN — Medication 10 ML: at 06:49

## 2018-10-09 RX ADMIN — SENNOSIDES AND DOCUSATE SODIUM 1 TABLET: 8.6; 5 TABLET ORAL at 10:14

## 2018-10-09 NOTE — PROGRESS NOTES
Care Management Interventions  PCP Verified by CM: Yes (Dr Janice Cantor )  Last Visit to PCP: 09/04/18  Palliative Care Criteria Met (RRAT>21 & CHF Dx)?: No  Mode of Transport at Discharge:  (dgt car)  Transition of Care Consult (CM Consult): Home Health (AT [address] [place] [address])  [address]: No  Reason Outside [place]: Physician referred to specific agency  Discharge Durable Medical Equipment: No  Physical Therapy Consult: Yes  Occupational Therapy Consult: Yes  Current Support Network: Own Home (ADULT DGT  [facility] )  Confirm Follow Up Transport: Family  Plan discussed with Pt/Family/Caregiver: Yes (PATIENT )  Freedom of Choice Offered: Yes  [address] Provided?: No  Discharge Location  Discharge Placement: Home with home health    met with patient and discussed her plans for home health post discharge. Patient does not have an AMD at this time. 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 Patient has equiptment at home and has no needs She uses the [address] for her prescriptions. Patient is tentatively planning on discharge in am.  No other needs noted at this time.

## 2018-10-09 NOTE — PROGRESS NOTES
Ortho Daily Progress Note    10/9/2018  9:53 AM    POD:  1 Day Post-Op  S/P:  Procedure(s):  RIGHT TOTAL HIP ARTHROPLASTY ANTERIOR APPROACH    Afebrile/VSS  Doing well without complaints of nausea  Pain well controlled  Calves soft/NTTP Bilaterally  Incision OK, no drainage or dehiscence. Dressing clean and dry  Moving lower extremities well. Neurocirculatory exam intact and within normal range. Precipitous drop in hgb, possibly hemo dilutional, hemodynamically stable. asymptomatic     Lab Results   Component Value Date/Time    HGB 8.3 (L) 10/09/2018 03:16 AM    INR 1.0 09/28/2018 11:38 AM     Recent Labs      10/09/18   0316  09/28/18   1138  07/17/18   0854  02/26/18   0900   CREA  0.82  0.66  0.75  0.66   BUN  11  14  14  15     Estimated Creatinine Clearance: 73 mL/min (based on Cr of 0.82). PLAN:  DVT prophylaxis: Xarelto  WBAT with PT-mobilization  Pain Control  Plan to D/C: tomorrow (10/10/2018) with HH/PT    ALISON Monae-S2    Seen and examined with above student.  Agree with findings and treatment plan      ALISON Joseph

## 2018-10-09 NOTE — PROGRESS NOTES
Problem: Mobility Impaired (Adult and Pediatric)  Goal: *Acute Goals and Plan of Care (Insert Text)  Physical Therapy Goals  Initiated 10/8/2018    1. Patient will move from supine to sit and sit to supine , scoot up and down and roll side to side in bed with independence within 4 days. 2. Patient will perform sit to stand with modified independence within 4 days. 3. Patient will ambulate with modified independence for 150 feet with the least restrictive device within 4 days. 4. Patient will ascend/descend 2 stairs with no handrail(s) with modified independence within 4 days. 5. Patient will perform hip home exercise program per protocol with independence within 4 days. physical Therapy TREATMENT  Patient: Jane Holbrook (12 y.o. female)  Date: 10/9/2018  Diagnosis: OSTEOARTHRITIS BILATERAL HIP  Osteoarthritis of right hip <principal problem not specified>  Procedure(s) (LRB):  RIGHT TOTAL HIP ARTHROPLASTY ANTERIOR APPROACH (Right) 1 Day Post-Op  Precautions:    Chart, physical therapy assessment, plan of care and goals were reviewed. ASSESSMENT:  Pt received supine in bed; agreeable to PT. Progressing well towards goals, anticipate pt clearing Pt this afternoon after stair training. Pt able to transfer to EOB using gait belt for RLE assist w/ Supervision; sit<>stand w/ SBA. Standing balance intact, no LOB or buckling during gait. Pt completed 300Ft using RW (SBA-Supervision). Pt VSS, no complaints of orthostatic symptoms. Pt amb to BR prior to returning to bed. Completed supine bed exercises w/ supervision-set up. Will f/u this afternoon for stair training. Progression toward goals:  [x]      Improving appropriately and progressing toward goals  []      Improving slowly and progressing toward goals  []      Not making progress toward goals and plan of care will be adjusted     PLAN:  Patient continues to benefit from skilled intervention to address the above impairments.   Continue treatment per established plan of care. Discharge Recommendations:  Home Health  Further Equipment Recommendations for Discharge:rolling walker (delivered to pt room and adjusted to pt height)     SUBJECTIVE:   Patient stated I'm trying to be a good patient.     OBJECTIVE DATA SUMMARY:   Critical Behavior:   Alert and Oriented x4             Functional Mobility Training:  Bed Mobility:     Supine to Sit: Supervision; Adaptive equipment (gait belt for RLE)  Sit to Supine: Supervision; Adaptive equipment           Transfers:  Sit to Stand: Stand-by assistance  Stand to Sit: Stand-by assistance                             Balance:  Sitting: Intact  Standing: Intact  Ambulation/Gait Training:  Distance (ft): 300 Feet (ft)  Assistive Device: Gait belt;Walker, rolling  Ambulation - Level of Assistance: Supervision;Stand-by assistance        Gait Abnormalities: Antalgic;Decreased step clearance  Right Side Weight Bearing: As tolerated  Left Side Weight Bearing: Full     Stance: Right decreased  Speed/Etelvina: Pace decreased (<100 feet/min)  Step Length: Left shortened;Right shortened                   Stairs: will plan for this afternoon             Therapeutic Exercises:   SUPINE  EXERCISES   Sets   Reps   Active Active Assist   Passive Self ROM   Comments   Ankle Pumps  10 [x]                                        []                                        []                                        []                                           Quad Sets  10 [x]                                        []                                        []                                        []                                           Heel Slides  10 []                                        [x]                                        []                                        []                                        Using gait belt for assist   Hip Abduction  10 []                                        [x] []                                        []                                           Glut Sets  10 []                                        []                                        []                                        []                                              []                                        []                                        []                                        []                                              []                                        []                                        []                                        []                                             STANDING  EXERCISES   Sets   Reps   Active Active Assist   Passive Self ROM   Comments   Heel Raises   []                                        []                                        []                                        []                                           Hip Abduction   []                                        []                                        []                                        []                                              []                                        []                                        []                                        []                                              []                                        []                                        []                                        []                                             Pain:  Pain Scale 1: Numeric (0 - 10)  Pain Intensity 1: 4  Pain Location 1: Hip  Pain Orientation 1: Right  Pain Description 1: Aching; Sore  Pain Intervention(s) 1: Rest;Repositioned; Ice  Activity Tolerance:   VSS  Please refer to the flowsheet for vital signs taken during this treatment.   After treatment:   [] Patient left in no apparent distress sitting up in chair  [x] Patient left in no apparent distress in bed  [x] Call bell left within reach  [x] Nursing notified  [] Caregiver present  [] Bed alarm activated    COMMUNICATION/COLLABORATION:   The patients plan of care was discussed with: Registered Nurse    Samantha Erazo PTA   Time Calculation: 26 mins

## 2018-10-09 NOTE — ANESTHESIA POSTPROCEDURE EVALUATION
Post-Anesthesia Evaluation and Assessment    Patient: Garrett Fabian MRN: [id number]  SSN: [id number]    YOB: 1960  Age: 59 y.o. Sex: female       Cardiovascular Function/Vital Signs  Visit Vitals    /74 (BP 1 Location: Left arm, BP Patient Position: Sitting)    Pulse 87    Temp 36.8 °C (98.2 °F)    Resp 16    Ht 5' 3\" (1.6 m)    Wt 79.8 kg (176 lb)    SpO2 99%    BMI 31.18 kg/m2       Patient is status post general anesthesia for Procedure(s):  RIGHT TOTAL HIP ARTHROPLASTY ANTERIOR APPROACH. Nausea/Vomiting: None    Postoperative hydration reviewed and adequate. Pain:  Pain Scale 1: Numeric (0 - 10) (10/09/18 0813)  Pain Intensity 1: 4 (10/09/18 0813)   Managed    Neurological Status:   Neuro (WDL): Within Defined Limits (10/08/18 1150)   At baseline    Mental Status and Level of Consciousness: Arousable    Pulmonary Status:   O2 Device: Room air (10/09/18 0813)   Adequate oxygenation and airway patent    Complications related to anesthesia: None    Post-anesthesia assessment completed.  No concerns    Signed By: Estephania Maynard MD     October 9, 2018

## 2018-10-09 NOTE — PROGRESS NOTES
Occupational Therapy hip EVALUATION/discharge    Patient: Juana Cabrera (55 y.o. female)  Date: 10/9/2018  Primary Diagnosis: OSTEOARTHRITIS BILATERAL HIP  Osteoarthritis of right hip  Procedure(s) (LRB):  RIGHT TOTAL HIP ARTHROPLASTY ANTERIOR APPROACH (Right) 1 Day Post-Op   Precautions:        ASSESSMENT :  Based on the objective data described below, the patient presents with good functional mobility and ability to complete self care s/p POD1 KADEEM anterior, up to bathroom with S with RW, min A to setup for LB ADLs, setup to I for UB ADLs, concerned with lower toilet level on 1st floor half bath, educated on use of 3-1 BSC for over toilet due to small grand-children living in home needing to use bathroom where at toilet riser would not be ideal. Min A for LB dressing retraining completed however patient going to wear \"dresses\" declined AE education. Educated on activity modification, building strength and endurance with ADLs and fall prevention. No discharge needs. Patient will benefit from skilled intervention to address the above impairments. Patients rehabilitation potential is considered to be Excellent  Factors which may influence rehabilitation potential include:   [x]                None noted  []                Mental ability/status  []                Medical condition  []                Home/family situation and support systems  []                Safety awareness  []                Pain tolerance/management  []                Other:      PLAN :  Frequency/Duration: discharge OT  Discharge Recommendations: None  Further Equipment Recommendations for Discharge: BSC-for over toilet, patient to purchase     SUBJECTIVE:   Patient stated I am just worried about the low toilet.     OBJECTIVE DATA SUMMARY:   HISTORY:   Past Medical History:   Diagnosis Date    Arthritis     degenerative & generalized    Chronic pain     right hip    Diabetes mellitus type 2, controlled ([address] 8/5/2015    Elevated liver enzymes     H/O bone density study 10/12    normal    H/O seasonal allergies     Hypercalcemia 5/8/2017    Hypertension     resolved last in May    Nausea & vomiting     Other and unspecified hyperlipidemia 8/5/2015    Psychiatric disorder     anxiety no meds     Past Surgical History:   Procedure Laterality Date    HX ABDOMINOPLASTY  2003    HX BREAST REDUCTION Bilateral 11/21/2017    BILATERAL BREAST REDUCTION performed by Jemal Hernandez MD at [address]  [place] [address],[facility]    HX CYST REMOVAL Right 2008    ganglion - wrist    HX GI  11/2011    colonoscopy-normal-10 years-done in [address]    BTL    HX HEENT Right 2007    Lasik    HX ORTHOPAEDIC  06/21/2016    back surgery L3- L5 , screws and rods and plates    HX OTHER SURGICAL Right 1991    plantar wart of FOOT & 3th and 5th toe ? procedure    HX TONSILLECTOMY  1967       Prior Level of Function/Environment/Context: independent, working, driving, family lives with her  Occupations in which the patient is/was successful, what are the barriers preventing that success:   Performance Patterns (routines, roles, habits, and rituals):   Personal Interests and/or values:   Expanded or extensive additional review of patient history:     Home Situation  Home Environment: Private residence  # Steps to Enter: 2  Rails to Enter: No  Hand Rails : Right  One/Two Story Residence: Two story, live on 1st floor  # of Interior Steps: 13  Height of Each Step (in): 12 inches  Interior Rails: Right  Lift Chair Available: No  Living Alone: Yes  Support Systems: Child(erin)  Patient Expects to be Discharged to[de-identified] Private residence  Current DME Used/Available at Home: None  Tub or Shower Type: Tub/Shower combination    Hand dominance: Right    EXAMINATION OF PERFORMANCE DEFICITS:    Cognitive/Behavioral Status:  Neurologic State: alert   Orientation: oriented to time, place, person and situation  Cognition:   appropriate decision making, appropriate for age attention/concentration and appropriate safety awareness   Safety/Judgement: Awareness of environment and Insight into deficits    Skin: intact, bandage intact    Edema: moderate R hip/ice pack donned    Hearing: Auditory  Auditory Impairment: None    Vision/Perceptual:    normal  Range of Motion:  R UE: within normal limits   L UE: within normal limits     Strength:  R UE: 5/5  L UE: 5/5     Coordination:  within normal limits     Tone & Sensation:  Bilateral tone normal, sensation normal.       Balance:      Balance    Good   Fair   Poor   Unable   Comments   Sitting static [x]  []  []  []     Sitting dynamic [x]  []  []  []     Standing static [x]  []  []  []     Standing dynamic [x]  []  []  []       Functional Mobility and Functional Transfers in preparation for ADLs:    Functional Status      Indep   Mod Indep   Supervision/  Set- Up    Min Assist   Mod Assist   Max assist   Total Assist   Comments   Rolling   []  []  []  []    []    []  []     Supine to sit   []  []  []  []  []  []  []     Sit to supine   []  []  []  []  []  []  []     Sit to stand   []  []  []  []  []  []  []     Toilet transfer   []  [x]  []  []  []  []  []     ADL functional mobility    []  [x]  []  []  []  []  []       ADL Assessment:  Feeding with Independent    Grooming with Setup    Bathing with Setup    Upper body dressing with Setup    Lower body dressing with Minimum assistance    Toileting with Modified independent    ADL Intervention and task modifications:  self-feeding, grooming, upper body dressing, lower body dressing, toileting and toilet transfer with Modified independent to Minimum assistance    Patient instructed and indicated understanding the benefits of maintaining activity tolerance, functional mobility, and independence with self care tasks during acute stay  to ensure safe return home and to baseline.  Encouraged patient to increase frequency and duration OOB, be out of bed for all meals, perform daily ADLs (as approved by RN/MD regarding bathing etc), and performing functional mobility to/from bathroom. .       Bathing: Patient instructed and indicated understanding when bathing, to not submerge wound in water, stand to shower or sponge bathe, cover wound with plastic and tape to ensure no water reaches bandage/wound without cues. Dressing joint: Patient instructed and demonstrated to don/doff Right LE first/last verbal cues. Patient instructed and demonstrated to don all clothing while sitting prior to standing, doff all clothing to knees while standing, then sit to doff clothing off from knees to feet in order to facilitate fall prevention, pain management, and energy conservation with Minimum assistance. Home safety: Patient instructed and indicated understanding on home modifications and safety (raise height of ADL objects, appropriate height of chair surfaces, recliner safety, change of floor surfaces, clear pathways) to increase independence and fall prevention. Standing: Patient instructed and demonstrated to walk up to sink/counter top/surfaces, step into walker to increase safety of joint and fall prevention with Modified independent. Patient instructed and demonstrated to avoid internal rotation of Right LE verbal cues. Instructed to apply concept to ADLs within the home (no reaching across body to Left side, square off while using objects, slide objects along surfaces). Patient instructed and indicated understanding to increase amount of time standing, observe standing position during ADLs in order to increase even weight bearing through bilateral LEs in order to increase independence with ADLs. Goal to be reached 30 days post - op, per orthopedic surgeon or per PT. Therapeutic Exercise:  encouraged OOB and full participation with ADLs to improve strength and endurance.         Occupational Therapy Evaluation Charge Determination   History Examination Decision-Making   LOW Complexity : Brief history review  LOW Complexity : 1-3 performance deficits relating to physical, cognitive , or psychosocial skils that result in activity limitations and / or participation restrictions  LOW Complexity : No comorbidities that affect functional and no verbal or physical assistance needed to complete eval tasks       Based on the above components, the patient evaluation is determined to be of the following complexity level: LOW   Pain:  Pre treatment: 2 /10   Post treatment:  2/10   Location: R LE, hip    Description:throbbing   Aggravating factors: post ambulation with PT     Activity Tolerance:  good    Please refer to the flowsheet for vital signs taken during this treatment. After treatment position:    Bed in low position  RN notified     COMMUNICATION/EDUCATION:   The patients plan of care was discussed with: Physical Therapist and Registered Nurse. [x]    Home safety education was provided and the patient/caregiver indicated understanding. [x]    Patient/family have participated as able in goal setting and plan of care. [x]    Patient/family agree to work toward stated goals and plan of care. []    Patient understands intent and goals of therapy, but is neutral about his/her participation. []    Patient is unable to participate in goal setting and plan of care. This patients plan of care is appropriate for delegation to [facility].     Thank you for this referral.  Jolene York, LYDIA  Time Calculation: 14 mins

## 2018-10-09 NOTE — PROGRESS NOTES
Problem: Mobility Impaired (Adult and Pediatric)  Goal: *Acute Goals and Plan of Care (Insert Text)  Physical Therapy Goals  Initiated 10/8/2018    1. Patient will move from supine to sit and sit to supine , scoot up and down and roll side to side in bed with independence within 4 days. 2. Patient will perform sit to stand with modified independence within 4 days. 3. Patient will ambulate with modified independence for 150 feet with the least restrictive device within 4 days. 4. Patient will ascend/descend 2 stairs with no handrail(s) with modified independence within 4 days. 5. Patient will perform hip home exercise program per protocol with independence within 4 days. physical Therapy TREATMENT  Patient: Jero Hyde (68 y.o. female)  Date: 10/9/2018  Diagnosis: OSTEOARTHRITIS BILATERAL HIP  Osteoarthritis of right hip <principal problem not specified>  Procedure(s) (LRB):  RIGHT TOTAL HIP ARTHROPLASTY ANTERIOR APPROACH (Right) 1 Day Post-Op  Precautions:    Chart, physical therapy assessment, plan of care and goals were reviewed. ASSESSMENT:  Pt agreeable to PT this afternoon. Pt w/ increased stiffness this afternoon, reports completing exercises in bed between therapy sessions. Pt completed/cleared 8 steps using single rail (R). Pt also completed gait x150 FT (RW, SBA). Maintained reciprocal gait. Pt amb to BR prior to returning to bed w/ Supervision and gait belt for RLE into bed. Pt ready for d/c home w/ HHPT. RW delivered to room this AM and adjusted to pt height. Will f/u in AM for additional questions/concerns regarding PT. Progression toward goals:  [x]      Improving appropriately and progressing toward goals  []      Improving slowly and progressing toward goals  []      Not making progress toward goals and plan of care will be adjusted     PLAN:  Patient continues to benefit from skilled intervention to address the above impairments.   Continue treatment per established plan of care.  Discharge Recommendations:  Home Health  Further Equipment Recommendations for Discharge:  RW (delivered and adjusted to pt height)     SUBJECTIVE:   \"Let me walk a little bit first.\" Pt also reports increased stiffness this afternoon compared to this AM.     OBJECTIVE DATA SUMMARY:   Functional Mobility Training:  Bed Mobility:     Supine to Sit: Supervision; Adaptive equipment (gait belt for RLE)  Sit to Supine: Supervision; Adaptive equipment           Transfers:  Sit to Stand: Stand-by assistance  Stand to Sit: Stand-by assistance                             Ambulation/Gait Training:  Distance (ft): 150 Feet (ft)  Assistive Device: Gait belt;Walker, rolling  Ambulation - Level of Assistance: Stand-by assistance        Gait Abnormalities: Antalgic;Decreased step clearance  Right Side Weight Bearing: As tolerated  Left Side Weight Bearing: Full  Base of Support: Widened  Stance: Right decreased  Speed/Etelvina: Pace decreased (<100 feet/min)  Step Length: Left shortened;Right shortened        Interventions: Visual/Demos          Stairs:  Number of Stairs Trained: 8 (4 steps x2)  Stairs - Level of Assistance: Stand-by assistance;Contact guard assistance  Rail Use: Right     Therapeutic Exercises:   Exercises performed per protocol. See morning treatment note for description. Pain:  Pain Scale 1: Numeric (0 - 10)  Pain Intensity 1: 4  Pain Location 1: Hip  Pain Orientation 1: Right  Pain Description 1: Aching; Sore  Pain Intervention(s) 1: Rest;Repositioned; Ice  Activity Tolerance:   VSS. Please refer to the flowsheet for vital signs taken during this treatment.   After treatment:   [] Patient left in no apparent distress sitting up in chair  [x] Patient left in no apparent distress in bed  [x] Call bell left within reach  [x] Nursing notified  [] Caregiver present  [] Bed alarm activated    COMMUNICATION/COLLABORATION:   The patients plan of care was discussed with: Registered Nurse    Edgardo Butts Time Calculation: 24 mins

## 2018-10-10 VITALS
RESPIRATION RATE: 16 BRPM | BODY MASS INDEX: 31.18 KG/M2 | HEART RATE: 99 BPM | DIASTOLIC BLOOD PRESSURE: 72 MMHG | OXYGEN SATURATION: 97 % | SYSTOLIC BLOOD PRESSURE: 109 MMHG | HEIGHT: 63 IN | TEMPERATURE: 98.8 F | WEIGHT: 176 LBS

## 2018-10-10 LAB
GLUCOSE BLD STRIP.AUTO-MCNC: 109 MG/DL (ref 65–100)
GLUCOSE BLD STRIP.AUTO-MCNC: 124 MG/DL (ref 65–100)
HGB BLD-MCNC: 7.9 G/DL (ref 11.5–16)
SERVICE CMNT-IMP: ABNORMAL
SERVICE CMNT-IMP: ABNORMAL

## 2018-10-10 PROCEDURE — 74011000250 HC RX REV CODE- 250: Performed by: PHYSICIAN ASSISTANT

## 2018-10-10 PROCEDURE — 74011250637 HC RX REV CODE- 250/637: Performed by: ORTHOPAEDIC SURGERY

## 2018-10-10 PROCEDURE — 74011250637 HC RX REV CODE- 250/637: Performed by: PHYSICIAN ASSISTANT

## 2018-10-10 PROCEDURE — 85018 HEMOGLOBIN: CPT | Performed by: PHYSICIAN ASSISTANT

## 2018-10-10 PROCEDURE — 36415 COLL VENOUS BLD VENIPUNCTURE: CPT | Performed by: PHYSICIAN ASSISTANT

## 2018-10-10 PROCEDURE — 82962 GLUCOSE BLOOD TEST: CPT

## 2018-10-10 RX ORDER — CYCLOBENZAPRINE HCL 10 MG
5 TABLET ORAL
Qty: 50 TAB | Refills: 0 | Status: SHIPPED | OUTPATIENT
Start: 2018-10-10 | End: 2019-03-29 | Stop reason: ALTCHOICE

## 2018-10-10 RX ORDER — OXYCODONE HYDROCHLORIDE 5 MG/1
5-10 TABLET ORAL
Qty: 60 TAB | Refills: 0 | Status: SHIPPED | OUTPATIENT
Start: 2018-10-10 | End: 2018-10-10

## 2018-10-10 RX ORDER — CYCLOBENZAPRINE HCL 10 MG
5 TABLET ORAL
Qty: 50 TAB | Refills: 0 | Status: SHIPPED | OUTPATIENT
Start: 2018-10-10 | End: 2018-10-10

## 2018-10-10 RX ORDER — AMOXICILLIN 250 MG
1 CAPSULE ORAL 2 TIMES DAILY
Qty: 30 TAB | Refills: 0 | Status: SHIPPED | OUTPATIENT
Start: 2018-10-10

## 2018-10-10 RX ORDER — TRAMADOL HYDROCHLORIDE 50 MG/1
50 TABLET ORAL
Qty: 60 TAB | Refills: 0 | Status: SHIPPED | OUTPATIENT
Start: 2018-10-10 | End: 2019-03-29 | Stop reason: ALTCHOICE

## 2018-10-10 RX ADMIN — LORATADINE 10 MG: 10 TABLET ORAL at 08:55

## 2018-10-10 RX ADMIN — TRAMADOL HYDROCHLORIDE 50 MG: 50 TABLET, FILM COATED ORAL at 02:32

## 2018-10-10 RX ADMIN — POLYETHYLENE GLYCOL 3350 17 G: 17 POWDER, FOR SOLUTION ORAL at 08:54

## 2018-10-10 RX ADMIN — TRAMADOL HYDROCHLORIDE 50 MG: 50 TABLET, FILM COATED ORAL at 08:55

## 2018-10-10 RX ADMIN — ACETAMINOPHEN 650 MG: 325 TABLET ORAL at 09:10

## 2018-10-10 RX ADMIN — Medication 1 CAPSULE: at 08:55

## 2018-10-10 RX ADMIN — ACETAMINOPHEN 650 MG: 325 TABLET ORAL at 04:33

## 2018-10-10 RX ADMIN — SENNOSIDES AND DOCUSATE SODIUM 1 TABLET: 8.6; 5 TABLET ORAL at 08:54

## 2018-10-10 RX ADMIN — RIVAROXABAN 10 MG: 10 TABLET, FILM COATED ORAL at 13:03

## 2018-10-10 NOTE — PROGRESS NOTES
Complaints: none  Events: none      GEN:  NAD. AOx3   ABD:  S/NT/ND   RLE:  Dressing C/D/I    5/5 motor    Calf nttp (Bilat)    Sensate all distribution to light touch    1+ dp/pt pulses, foot perfused      Lab Results   Component Value Date/Time    HGB 7.9 (L) 10/10/2018 04:38 AM    INR 1.0 09/28/2018 11:38 AM            POD #2 RIGHT TOTAL HIP REPLACEMENT. Satisfactory progress. Patient is doing well. Pain is well-controlled on tramadol. She has cleared PT and is ready for discharge today. DVT Prophylaxis: Xarelto  Weight Bearing: WBAT RLE   Pain Control: PRN oral narcotics, celebrex  Anticipated Discharge Date: today  Disposition: Home, PT.

## 2018-10-10 NOTE — DISCHARGE INSTRUCTIONS
After Hospital Care Plan:  Discharge Instructions Anterior Approach   Hip Replacement-Dr. Nga Smith    Patient Agnes Jason  Date of procedure:10/8/2018    Procedure:Procedure(s):  RIGHT TOTAL HIP ARTHROPLASTY ANTERIOR APPROACH  Surgeon:Surgeon(s) and Role:     * Suzanne Goode MD - Primary   PCP: Markus Cordero MD  Date of discharge: 10/10/2018     Follow up appointments  -follow up with Dr. Nga Smith in 3 weeks. Call [phone] to make an appointment. [facility] Agency: _______________________   phone: _____________________  The agency will contact you to arrange dates/times for visits. Please call them if you do not hear from them within 24 hours after you are discharged    When to call your Orthopaedic Surgeon:  -Signs of hip dislocation-increased hip pain, unrelieved pain or if you have difficulty or are unable to walk  -Signs of infection-if your incision is red; continues to have drainage; drainage has a foul odor or if you have a persistent fever over 101 degrees  -Signs of a blood clot in your leg-calf pain, tenderness, redness, swelling of lower leg    When to call your Primary Care Physician:  -Concerns about medical conditions such as diabetes, high blood pressure, asthma, congestive heart failure  -Call if blood sugars are elevated, persistent headache or dizziness, coughing or congestion, constipation or diarrhea, burning with urination, abnormal heart rate    When to call 911and go to the nearest emergency room  -acute onset of chest pain, shortness of breath, difficulty breathing    Activity  - weight bearing as tolerated with walker or crutches.  Refer to pages 26-36 of your handbook for instructions and pictures  -20 repetitions of each exercise 3 times each day as instructed by the physical therapist.  Refer to pages 38-45 of our handbook for instructions and pictures  -get up every one hour and walk (except at night when sleeping)  -Avoid extreme movement of hip to prevent dislocation  -Do not drive or operate heavy machinery. Incision Care  -the Aquacel (brown, waterproof) surgical dressing is to remain on your hip for 7 days. On the 7th day have someone gently peel the dressing off by carefully lifting the edge and stretching it slightly to break the adhesive seal and leave incision open to air. You may take a shower with the Aquacel dressing in place. Preventing blood clots  -take Xarelto as prescribed by Dr. Raya Brumfield for one month following surgery  -wear elastic stockings (TEDS) for 4 weeks. You may remove them for approximately 1 hour daily for showering/sponge bathing    Pain management  -take Tramadol for pain as prescribed; decrease the amount you use as your pain lessens  -avoid alcoholic beverages while taking pain medication  -do not take any over-the-counter medication except for Tylenol (acetaminophen)  -Please be aware that many medications contain Tylenol. We do not want you to over medicate so please read the information below as a guide. Do not take more than 4 Grams of Tylenol in a 24 hour period. (There are 1000 milligrams in one Gram)  -You may place an ice bag on your hip for 15-20 minutes after exercising and as needed throughout the day and night    Diet  -resume usual diet; drink plenty of fluids; eat foods high in fiber  -you may want to take a stool softener (such as Senokot-S or Colace) to prevent constipation while you are taking pain medication.   If constipation occurs, take a laxative (such as Dulcolax tablets, Milk of Magnesia, or a suppository)    [facility] Protocol (to be followed by [address])    Nursing-per physicians order  -Draw a PT/INR per physicians order and call results to Dr. Raya Brumfield at [phone], extension [phone]   -remove Aquacel dressing at 7 days post-op  -complete head to toe assessment, vital signs  -medication reconciliation  -review pain management  -manage chronic medical conditions    Physical [address] [facility] physicians order    Weight bearing status:     Left Side Weight Bearing: Full  Right Side Weight Bearing: As tolerated    Mobility Status:  Supine to Sit: Supervision, Adaptive equipment (gait belt for RLE)  Sit to Stand: Stand-by assistance  Sit to Supine: Supervision, Adaptive equipment       Gait:  Distance (ft): 150 Feet (ft)  Ambulation - Level of Assistance: Stand-by assistance  Assistive Device: Gait belt, Walker, rolling  Gait Abnormalities: Antalgic, Decreased step clearance    ADL status overall composite:                   Physical Therapy  -assessment and evaluation-bed mobility; functional transfers (bed, chair, bathroom, stairs); ambulation with equipment, car transfers, safety and ability to get out of house in the event of an emergency  -review and maintain weight bearing as tolerated; avoid extreme movement of hip to prevent hip dislocation  -discuss pain management  -review how to do ADLs.   Refer to pages 46-50 of handbook    -Home Exercise Program-refer to pages 38-45 of handbook  -supine exercises-quad sets, hamstring sets, gluteal sets, hip abduction/adduction slides, hip external rotation, heel slides (flexing the knee)  -sitting exercises-ankle pumps, bicep curls (use weights as appropriate), triceps pushups, shoulder flexion (use weights as appropriate)  -standing exercises holding onto supportive counter-toe raises, heel raises, mini-squats, heel/toe touches with knee bend, marching in place, hip abduction/adduction, hip external rotation, hip extension, hip abduction/extension/external rotation (concentration on gluteus shane), heel toe taps    Physical therapy goals by discharge from [facility] [address] ambulation with walker, crutches or cane if needed (level surfaces and   stairs)  -Daily performance of home exercise program  -Independent with stair climbing and car transfers  -Progress to community ambulator (least restrictive assistive device)

## 2018-10-10 NOTE — PROGRESS NOTES
Physical Therapy:    Patient was cleared by PT services yesterday afternoon to discharge home with HHPT. Patient requesting to walk again prior to discharge home. Instructed rehab technician to ambulate patient with RW in the hallway this morning and review therex program. Rehab tech ambulated patient around the unit with a RW and reported no issues or complications. Pt is cleared to discharge home with HHPT. Discussed with RN. Will continue to be available if concerns arise.   Thank you    Say Reyes, PT, DPT

## 2018-10-10 NOTE — PROGRESS NOTES
Patient was not available due to staff attention. Will follow-up as necessary.     Errol Kirby, [facility], Gnosticist Provider

## 2018-10-10 NOTE — PROGRESS NOTES
Bedside and Verbal shift change report given to [address] [place] [address] Street (oncoming nurse) by Fran Kothari (offgoing nurse). Report included the following information SBAR, Procedure Summary, Intake/Output, MAR, Recent Results and Med Rec Status.

## 2018-10-10 NOTE — PROGRESS NOTES
1:13 PM  The Joint Replacement Discharge Education video was reviewed by the patient/family. The content was discussed utilizing teach back, questions were answered. The patient verbalized an understanding of the instructions. Pt discharged home with family, rx, and all personal belongings.

## 2018-10-10 NOTE — PROGRESS NOTES
Bedside and Verbal shift change report given to Bobby Chandra rn (oncoming nurse) by Glenn Reyes RN (offgoing nurse). Report included the following information SBAR, Kardex, Procedure Summary, Intake/Output, MAR, Accordion and Recent Results.

## 2018-10-12 NOTE — DISCHARGE SUMMARY
[address] [place] [address]   [address] [address]    DISCHARGE SUMMARY     Patient: Yanna Vuong                             Medical Record Number: [id number]                : 1960  Age: 59 y.o. Admit Date: 10/8/2018  Discharge Date: 10/10/2018  Admission Diagnosis: OSTEOARTHRITIS BILATERAL HIP  Osteoarthritis of right hip  Discharge Diagnosis: OSTEOARTHRITIS BILATERAL HIP  Procedures: Procedure(s):  RIGHT TOTAL HIP ARTHROPLASTY ANTERIOR APPROACH  Surgeon: Rosi Suero  Assistants: Shelly  Anesthesia: general  Complications: None     History of Present Illness:  Yanna Vuong is a 59 y.o. female with a history of Right hip pain, swelling, and marked loss of function. Despite conservative management and after clinical and radiographic evaluation, it was determined that she suffered from end-stage osteoarthritis and would benefit from Procedure(s):  RIGHT TOTAL HIP ARTHROPLASTY ANTERIOR APPROACH, which she consented to undergo after a discussion of the risks, benefits, alternatives, rehab concerns, and potential complications of surgery. Hospital Course:  Yanna Vuong tolerated the procedure well. She was transferred  to the recovery room in stable condition. After a brief stay the patient was then transferred to the Joint Replacement Unit at [address] [place] [address].  On postoperative day #1, the dressing was clean and dry, she was neurovascularly intact. The patient was afebrile and vital signs were stable. Calves were soft and non-tender bilaterally. On postoperative day  # 2, the patient was tolerating a regular diet and making satisfactory progress with physical therapy.   Hemoglobin and INR prior to discharge were   Lab Results   Component Value Date/Time    HGB 7.9 (L) 10/10/2018 04:38 AM    INR 1.0 2018 11:38 AM   .  Yanna Vuong made satisfactory progress with physical therapy and was discharged to Home with Home Health in stable condition on postoperative day 2.  She was provided with routine postoperative instructions and advised to follow up in my office in 3 weeks following discharge from the hospital.  She was prescribed Xarelto for DVT prophylaxis and tramadol for post-operative pain. Discharge Medications:  Cannot display discharge medications since this patient is not currently admitted.       Signed by: Suzanne Goode MD  10/12/2018

## 2019-01-04 ENCOUNTER — OFFICE VISIT (OUTPATIENT)
Dept: INTERNAL MEDICINE CLINIC | Age: 59
End: 2019-01-04

## 2019-01-04 VITALS
BODY MASS INDEX: 32.6 KG/M2 | WEIGHT: 184 LBS | TEMPERATURE: 98.1 F | SYSTOLIC BLOOD PRESSURE: 140 MMHG | HEART RATE: 81 BPM | DIASTOLIC BLOOD PRESSURE: 82 MMHG | OXYGEN SATURATION: 98 % | HEIGHT: 63 IN | RESPIRATION RATE: 18 BRPM

## 2019-01-04 DIAGNOSIS — E83.52 HYPERCALCEMIA: ICD-10-CM

## 2019-01-04 DIAGNOSIS — I10 BENIGN ESSENTIAL HYPERTENSION: ICD-10-CM

## 2019-01-04 DIAGNOSIS — E78.5 HYPERLIPIDEMIA WITH TARGET LDL LESS THAN 100: ICD-10-CM

## 2019-01-04 DIAGNOSIS — E11.9 CONTROLLED TYPE 2 DIABETES MELLITUS WITHOUT COMPLICATION, WITHOUT LONG-TERM CURRENT USE OF INSULIN (HCC): Primary | ICD-10-CM

## 2019-01-04 NOTE — PROGRESS NOTES
Hypertension (3 mth f/u ); Diabetes (3 mth f/u); and Hospital Follow Up [facility] 10/08/2018-10/11/2018 DX: Right hip Sx)       HPI:  Christiane Umana is a 59y.o. year old female who is here for a follow up visit. She was last seen by me on 8/6/2018. She reports the following: Had right hip replacement. Doesn't check blood sugars    Changed generic brand of metformin by Hunter Marinelli and it caused constipation. Hg went down to 7.9 after surgery. Started on iron. Was told she lost blood during surgery. Refused transfusion. Wt Readings from Last 3 Encounters:   01/04/19 184 lb (83.5 kg)   10/08/18 176 lb (79.8 kg)   10/03/18 177 lb 12.8 oz (80.6 kg)        Fingerstick today Hga1c (discussed its value with patient):  7.5        Assessment and Plan        1. Un-Controlled type 2 diabetes mellitus without complication, without long-term current use of insulin ([address]  Pt stopped metformin generic. Will change to brand name metformin and may add [place]. - AMB POC HEMOGLOBIN A1C  - HEMOGLOBIN A1C WITH EAG    2. Benign essential hypertension  Tolerating medication. Denies dizziness that is positional, SOB, or chest pain. Understands the importance of compliance to reduce risk of future heart failure. Agreed to call if any of above symptoms develop and  stay on current regimen of    Key CAD CHF Meds             aspirin delayed-release 81 mg tablet  (Taking) Take  by mouth daily. fish,bora,flax oils-om3,6,9no1 (OMEGA 3-6-9) 1,200 mg cap  (Taking) Take  by mouth. hydroCHLOROthiazide (HYDRODIURIL) 25 mg tablet  (Taking) Take 1 Tab by mouth daily.     rivaroxaban (XARELTO) 10 mg tablet Take 1 Tab by mouth daily (with lunch).    hydroCHLOROthiazide (HYDRODIURIL) 25 mg tablet TAKE ONE TABLET BY MOUTH DAILY         BP Readings from Last 3 Encounters:   01/04/19 140/82   10/10/18 109/72   10/03/18 128/84      Pulse Readings from Last 3 Encounters:   01/04/19 81   10/10/18 99   10/03/18 88       - METABOLIC PANEL, COMPREHENSIVE; Future  - LIPID PANEL; Future  - CBC WITH AUTOMATED DIFF; Future  - CBC WITH AUTOMATED DIFF  - CBC WITH AUTOMATED DIFF  - METABOLIC PANEL, COMPREHENSIVE  - LIPID PANEL    3. Hyperlipidemia with target LDL less than 100  The nature of cardiac risk has been fully discussed with this patient. I have made her aware of her LDL target goal given her cardiovascular risk analysis. I have discussed the appropriate diet. The need for lifelong compliance in order to reduce risk is stressed. A regular exercise program is recommended to help achieve and maintain normal body weight, fitness and improve lipid balance. The risks and benefits of medications were discussed. Last cholesterol labs reviewed with patient. Patient made aware to get liver checked every 6 months. Continue with    Key Antihyperlipidemia Meds             fish,bora,flax oils-om3,6,9no1 (OMEGA 3-6-9) 1,200 mg cap  (Taking) Take  by mouth. really should be on a statin but she doesn't want to be on one   - LIPID PANEL; Future    4. Hypercalcemia  Patient compliant with Vit D. Emphasized importance of taking with food and not too much because it can be toxic to our body. Therefore, it should be checked regularly. Levels ordered. - VITAMIN D, 25 HYDROXY      Basically, she needs to get back on her exercise regimen, work on weight loss, and reconsider statin.       Visit Vitals  /82 (BP 1 Location: Left arm, BP Patient Position: Sitting)   Pulse 81   Temp 98.1 °F (36.7 °C) (Oral)   Resp 18   Ht 5' 3\" (1.6 m)   Wt 184 lb (83.5 kg)   SpO2 98%   BMI 32.59 kg/m²       Historical Data    Past Medical History:   Diagnosis Date    Arthritis     degenerative & generalized    Chronic pain     right hip    Diabetes mellitus type 2, controlled ([address] 8/5/2015    Elevated liver enzymes     H/O bone density study 10/12    normal    H/O seasonal allergies     Hypercalcemia 5/8/2017    Hypertension     resolved last in May    Nausea & vomiting     Other and unspecified hyperlipidemia 8/5/2015    Psychiatric disorder     anxiety no meds       Past Surgical History:   Procedure Laterality Date    HX ABDOMINOPLASTY  2003    HX BREAST REDUCTION Bilateral 11/21/2017    BILATERAL BREAST REDUCTION performed by Sim Oliveira MD at [address]  [phone]    HX CYST REMOVAL Right 2008    ganglion - wrist    HX GI  11/2011    colonoscopy-normal-10 years-done in [address]    BTL    HX HEENT Right 2007    Lasik    HX ORTHOPAEDIC  06/21/2016    back surgery L3- L5 , screws and rods and plates    HX OTHER SURGICAL Right 1991    plantar wart of FOOT & 3th and 5th toe ? procedure    HX TONSILLECTOMY  1967       Outpatient Encounter Medications as of 1/4/2019   Medication Sig Dispense Refill    GLUCOPHAGE  mg tablet Take 1 Tab by mouth two (2) times a day. 180 Tab 3    senna-docusate (PERICOLACE) 8.6-50 mg per tablet Take 1 Tab by mouth two (2) times a day. 30 Tab 0    ascorbic acid/multivit-min (EMERGEN-C PO) Take 1,000 mg by mouth daily.  aspirin delayed-release 81 mg tablet Take  by mouth daily.  TURMERIC PO Take 500 mg by mouth daily.  potassium 99 mg tablet Take 99 mg by mouth every other day.  LUTEIN PO Take 25 mcg by mouth daily.  OTHER bromium 500 mg every morning      OTHER Berta daily (500 mg)      fish,bora,flax oils-om3,6,9no1 (OMEGA 3-6-9) 1,200 mg cap Take  by mouth.  hydroCHLOROthiazide (HYDRODIURIL) 25 mg tablet Take 1 Tab by mouth daily. 90 Tab 3    triamcinolone (NASACORT) 55 mcg nasal inhaler 2 Sprays nightly.  loratadine (CLARITIN) 10 mg tablet Take 10 mg by mouth daily.  coenzyme q10 (CO Q-10) 10 mg cap Take 100 mg by mouth daily.  zinc 50 mg tab tablet Take 50 mg by mouth daily.  B.infantis-B.ani-B.long-B.bifi (PROBIOTIC 4X) 10-15 mg TbEC Take  by mouth daily.       ONETOUCH DELICA LANCETS 30 gauge misc       glucose blood VI test strips (ASCENSIA AUTODISC VI, ONE TOUCH ULTRA TEST VI) strip Use to check blood sugar once daily. 100 Strip 1    Blood-Glucose Meter monitoring kit Use as directed. Dx: E11.9 1 Kit 0    Lancets misc Use as directed. Dx: E11.9 100 Each 1    magnesium 250 mg Tab Take 400 mg by mouth daily.  melatonin 3 mg tablet Take 3 mg by mouth nightly.  [DISCONTINUED] GLUCOPHAGE  mg tablet Take 1 Tab by mouth daily (with dinner). 90 Tab 3    rivaroxaban (XARELTO) 10 mg tablet Take 1 Tab by mouth daily (with lunch). 28 Tab 0    traMADol (ULTRAM) 50 mg tablet Take 1 Tab by mouth every six (6) hours as needed. Max Daily Amount: 200 mg. 60 Tab 0    cyclobenzaprine (FLEXERIL) 10 mg tablet Take 0.5 Tabs by mouth three (3) times daily as needed for Muscle Spasm(s). Indications: Muscle Spasm 50 Tab 0    hydroCHLOROthiazide (HYDRODIURIL) 25 mg tablet TAKE ONE TABLET BY MOUTH DAILY 90 Tab 3    [DISCONTINUED] metFORMIN ER (GLUCOPHAGE XR) 500 mg tablet TAKE ONE TABLET BY MOUTH TWICE A  Tab 11     No facility-administered encounter medications on file as of 1/4/2019. Allergies   Allergen Reactions    Ceclor [Cefaclor] Rash     Amilcar Trinidad    Sulfa (Sulfonamide Antibiotics) Anaphylaxis, Shortness of Breath and Rash    Invokana [Canagliflozin] Other (comments)     Alopecia and yeast     Micardis [Telmisartan] Myalgia     Leg muscle pain - subsided with discontinuation    Azithromycin Nausea Only     Pt states she is allergic to all mycin drugs; GI upset    Ciprofloxacin Other (comments)     Headache    Lexapro [Escitalopram] Other (comments)     Pt states she had arm pains and vision changes.  Losartan Cough    Other Medication Other (comments)     DERMABOND GLUE, HAD BREAST REDUCTION AND WOUND ALMOST DEHISCED.     Tetracycline Other (comments)     Stomach issues      Zyrtec [Cetirizine] Other (comments)     Pt states that it makes her heart race        Social History     Socioeconomic History    Marital status:      Spouse name: Single    Number of children: 2    Years of education: BA    Highest education level: Not on file   Social Needs    Financial resource strain: Not on file    Food insecurity - worry: Not on file    Food insecurity - inability: Not on file   [employer] needs - medical: Not on file   [employer] needs - non-medical: Not on file   Occupational History    Occupation: NP     Comment: [address]   Tobacco Use    Smoking status: Never Smoker    Smokeless tobacco: Never Used   Substance and Sexual Activity    Alcohol use: Yes     Alcohol/week: 0.0 oz     Comment: very rare    Drug use: No    Sexual activity: Not Currently   Other Topics Concern    Not on file   Social History Narrative    ** Merged History Encounter **             family history includes Arthritis-osteo in her brother, mother, and sister; Cancer in her father; Coronary Artery Disease in her brother; Diabetes in her mother; Hypertension in her mother, sister, and sister; Lung Disease in her brother; No Known Problems in her brother; Other in her maternal grandmother and paternal aunt. ROS      Physical Exam   Ortho Exam      Orders Placed This Encounter    METABOLIC PANEL, COMPREHENSIVE     Standing Status:   Future     Standing Expiration Date:   7/3/2019    LIPID PANEL     Standing Status:   Future     Standing Expiration Date:   7/3/2019    CBC WITH AUTOMATED DIFF     Standing Status:   Future     Number of Occurrences:   1     Standing Expiration Date:   7/3/2019    CVD REPORT    DIABETES PATIENT EDUCATION    AMB POC HEMOGLOBIN A1C    DISCONTD: GLUCOPHAGE  mg tablet     Sig: Take 1 Tab by mouth daily (with dinner). Dispense:  90 Tab     Refill:  3     Can't tolerate generic    GLUCOPHAGE  mg tablet     Sig: Take 1 Tab by mouth two (2) times a day. Dispense:  180 Tab     Refill:  3     Can't tolerate generic.   Please ignore previous rx I have reviewed the patient's medical history in detail and updated the computerized patient record. We had a prolonged discussion about these complex clinical issues and went over the various important aspects to consider. All questions were answered. Advised her to call back or return to office if symptoms do not improve, change in nature, or persist.    She was given an after visit summary or informed of [employer] [facility] which includes patient instructions, diagnoses, current medications, & vitals. She expressed understanding with the diagnosis and plan.

## 2019-01-04 NOTE — PROGRESS NOTES
Reviewed record in preparation for visit and have obtained necessary documentation. Identified pt with two pt identifiers(name and ).       Health Maintenance Due   Topic    Pneumococcal 19-64 Medium Risk (1 of 1 - PPSV23)    Shingrix Vaccine Age 50> (1 of 2)    FOOT EXAM Q1     DTaP/Tdap/Td series (2 - Td)         Chief Complaint   Patient presents with    Hypertension     3 mth f/u     Diabetes     3 mth f/u   [facility] Follow Up     [facility] 10/08/2018-10/11/2018 DX: Right hip Sx        Wt Readings from Last 3 Encounters:   19 184 lb (83.5 kg)   10/08/18 176 lb (79.8 kg)   10/03/18 177 lb 12.8 oz (80.6 kg)     Temp Readings from Last 3 Encounters:   10/10/18 98.8 °F (37.1 °C)   10/03/18 98.2 °F (36.8 °C) (Oral)   18 98.1 °F (36.7 °C)     BP Readings from Last 3 Encounters:   10/10/18 109/72   10/03/18 128/84   18 122/81     Pulse Readings from Last 3 Encounters:   10/10/18 99   10/03/18 88   18 82           Learning Assessment:  :     Learning Assessment 2017   PRIMARY LEARNER Patient Patient Patient Patient Patient Patient Patient   HIGHEST LEVEL OF EDUCATION - PRIMARY LEARNER  > 4 YEARS OF COLLEGE > 4 YEARS OF COLLEGE > 4 YEARS OF COLLEGE > 4 YEARS OF COLLEGE > 4 YEARS OF COLLEGE 2 YEARS OF COLLEGE -   BARRIERS PRIMARY LEARNER NONE NONE NONE NONE NONE NONE -   CO-LEARNER CAREGIVER - No No No No No -   PRIMARY LANGUAGE ENGLISH ENGLISH ENGLISH ENGLISH ENGLISH ENGLISH ENGLISH    NEED - - - No No No -   LEARNER PREFERENCE PRIMARY DEMONSTRATION DEMONSTRATION DEMONSTRATION DEMONSTRATION PICTURES DEMONSTRATION OTHER (COMMENT)     - - - READING READING - -     - - - - VIDEOS - -   LEARNING SPECIAL TOPICS - - - no no no -   ANSWERED BY patient patient patient patient patient patient patient   RELATIONSHIP SELF SELF SELF SELF SELF SELF SELF   ASSESSMENT COMMENT - - - none none - -       Depression Screening:  : PHQ over the last two weeks 1/4/2019   Little interest or pleasure in doing things Not at all   Feeling down, depressed, irritable, or hopeless Not at all   Total Score PHQ 2 0       Fall Risk Assessment:  :     Fall Risk Assessment, last 12 mths 1/4/2019   Able to walk? Yes   Fall in past 12 months? No       Abuse Screening:  :     Abuse Screening Questionnaire 1/4/2019 6/27/2018 11/29/2017 8/4/2015 7/14/2014   Do you ever feel afraid of your partner? N N N N N   Are you in a relationship with someone who physically or mentally threatens you? N N N N N   Is it safe for you to go home? Y Y Y Y Y       Coordination of Care Questionnaire:  :     1) Have you been to an emergency room, urgent care clinic since your last visit? no   Hospitalized since your last visit? yes             2) Have you seen or consulted any other health care providers outside of [address] [place] [address] since your last visit? no  (Include any pap smears or colon screenings in this section.)    3) Do you have an Advance Directive on file? no    4) Are you interested in receiving information on Advance Directives? NO      Patient is accompanied by self I have received verbal consent from Anuja Slade to discuss any/all medical information while they are present in the room.

## 2019-01-05 LAB
25(OH)D3+25(OH)D2 SERPL-MCNC: 64.8 NG/ML (ref 30–100)
ALBUMIN SERPL-MCNC: 4.5 G/DL (ref 3.6–4.8)
ALBUMIN/GLOB SERPL: 1.6 {RATIO} (ref 1.2–2.2)
ALP SERPL-CCNC: 90 IU/L (ref 39–117)
ALT SERPL-CCNC: 103 IU/L (ref 0–32)
AST SERPL-CCNC: 100 IU/L (ref 0–40)
BASOPHILS # BLD AUTO: 0.1 X10E3/UL (ref 0–0.2)
BASOPHILS NFR BLD AUTO: 1 %
BILIRUB SERPL-MCNC: 0.2 MG/DL (ref 0–1.2)
BUN SERPL-MCNC: 15 MG/DL (ref 8–27)
BUN/CREAT SERPL: 20 (ref 12–28)
CALCIUM SERPL-MCNC: 9.5 MG/DL (ref 8.7–10.3)
CHLORIDE SERPL-SCNC: 102 MMOL/L (ref 96–106)
CHOLEST SERPL-MCNC: 227 MG/DL (ref 100–199)
CO2 SERPL-SCNC: 25 MMOL/L (ref 20–29)
CREAT SERPL-MCNC: 0.74 MG/DL (ref 0.57–1)
EOSINOPHIL # BLD AUTO: 0.5 X10E3/UL (ref 0–0.4)
EOSINOPHIL NFR BLD AUTO: 8 %
ERYTHROCYTE [DISTWIDTH] IN BLOOD BY AUTOMATED COUNT: 16.1 % (ref 12.3–15.4)
EST. AVERAGE GLUCOSE BLD GHB EST-MCNC: 177 MG/DL
GLOBULIN SER CALC-MCNC: 2.8 G/DL (ref 1.5–4.5)
GLUCOSE SERPL-MCNC: 108 MG/DL (ref 65–99)
HBA1C MFR BLD HPLC: NORMAL %
HBA1C MFR BLD: 7.8 % (ref 4.8–5.6)
HCT VFR BLD AUTO: 38.2 % (ref 34–46.6)
HDLC SERPL-MCNC: 56 MG/DL
HGB BLD-MCNC: 11.8 G/DL (ref 11.1–15.9)
IMM GRANULOCYTES # BLD AUTO: 0 X10E3/UL (ref 0–0.1)
IMM GRANULOCYTES NFR BLD AUTO: 0 %
INTERPRETATION, 910389: NORMAL
LDLC SERPL CALC-MCNC: 154 MG/DL (ref 0–99)
LYMPHOCYTES # BLD AUTO: 2 X10E3/UL (ref 0.7–3.1)
LYMPHOCYTES NFR BLD AUTO: 34 %
Lab: NORMAL
MCH RBC QN AUTO: 23.6 PG (ref 26.6–33)
MCHC RBC AUTO-ENTMCNC: 30.9 G/DL (ref 31.5–35.7)
MCV RBC AUTO: 76 FL (ref 79–97)
MONOCYTES # BLD AUTO: 0.5 X10E3/UL (ref 0.1–0.9)
MONOCYTES NFR BLD AUTO: 8 %
NEUTROPHILS # BLD AUTO: 2.9 X10E3/UL (ref 1.4–7)
NEUTROPHILS NFR BLD AUTO: 49 %
PLATELET # BLD AUTO: 307 X10E3/UL (ref 150–379)
POTASSIUM SERPL-SCNC: 4.3 MMOL/L (ref 3.5–5.2)
PROT SERPL-MCNC: 7.3 G/DL (ref 6–8.5)
RBC # BLD AUTO: 5 X10E6/UL (ref 3.77–5.28)
SODIUM SERPL-SCNC: 141 MMOL/L (ref 134–144)
TRIGL SERPL-MCNC: 84 MG/DL (ref 0–149)
VLDLC SERPL CALC-MCNC: 17 MG/DL (ref 5–40)
WBC # BLD AUTO: 5.9 X10E3/UL (ref 3.4–10.8)

## 2019-01-05 NOTE — PROGRESS NOTES
Your Hg is back to normal but I am concerned about the LFT's going up. You had fatty liver on your ultrasound in the past, but if these do not go down with weight loss, we need to do further work up. Have you ever been exposed to hepatitis B or C? Have you ever been stuck by a needle at work?

## 2019-01-24 ENCOUNTER — TELEPHONE (OUTPATIENT)
Dept: INTERNAL MEDICINE CLINIC | Age: 59
End: 2019-01-24

## 2019-01-24 DIAGNOSIS — E11.9 DIABETES MELLITUS TYPE 2, CONTROLLED (HCC): ICD-10-CM

## 2019-01-24 RX ORDER — LANCETS
EACH MISCELLANEOUS
Qty: 100 EACH | Refills: 1 | Status: SHIPPED | OUTPATIENT
Start: 2019-01-24

## 2019-01-24 NOTE — TELEPHONE ENCOUNTER
Regarding: RE:Alexis After Visit Follow-Up Message. Contact: [phone]  ----- Message from [address] [place] [address] [address], [facility] sent at 1/24/2019  1:05 PM EST -----    I truly believe my fatty liver is due to increase  in my  heme A1c secondary to receiving steroids prior surgery in the form of Decadron, and post surgery injection in right need because of inflammation due to over use trying to heal from total right hip replacement. I am down 6 lbs since last visit. I have been checking my blood sugar a max of 4 times a day, and not eating if reading is over 150, so far readings have been treading downward. I need a script for more of the ONETOUCH ULTRA blue test strips, and OneTouch fine lancets. I.m almost out.        ----- Message -----  From: Bryson Zaldivar MD  Sent: 1/14/2019  9:45 AM EST  To: Paul Smith  Subject: Hudson Wiley After Visit Follow-Up Message. I am concerned about how high the liver function tests are. We need to repeat them in a couple of weeks don't you think? It could be fatty liver that we saw an ultrasound a couple of years back but it shouldn't be this high. Let me know your thoughts.      ----- Message -----     From: Paul Smith     Sent: 1/14/2019  8:55 AM EST       To: Bryson Zaldivar MD  Subject: RE:Alexis After Visit Follow-Up Message. I will reach out to them again.  ----- Message -----  From: Bryson Zaldivar MD  Sent: 1/10/2019  5:28 PM EST  To: Paul Smith  Subject: Hudson Wiley After Visit Follow-Up Message. Not sure why they don't have it. I sent it again to the [facility] [address]. ----- Message -----     From: Paul Smith     Sent: 1/10/2019  4:15 PM EST       To: Bryson Zaldivar MD  Subject: RE:Alexis After Visit Follow-Up Message. THANKS again, However Dariel still hasn't filled script for name brand Glucophage, so still not taking.   Please follow up with the [facility] on [facility] [address].   ----- Message -----  From: Bryson Zaldivar MD  Sent: 1/8/2019 12:00 AM EST  To: Kaur Quezada  Subject: [employer] After Visit Follow-Up Message. Lauren Ms. Demarco Tolentino you for your recent visit to [facility]. Your health is important to us and we are privileged to be your healthcare provider and partner. If you have follow-up questions regarding your treatment plan from todays visit, please contact us through the [facility] Portal by replying to this message. In the near future, you may receive a survey about your experience with us. We hope you will take the time to let us know how we did so we can continue to improve the care you receive.       Thank you,    [facility]

## 2019-01-27 ENCOUNTER — APPOINTMENT (OUTPATIENT)
Dept: CT IMAGING | Age: 59
End: 2019-01-27
Attending: PHYSICIAN ASSISTANT
Payer: COMMERCIAL

## 2019-01-27 ENCOUNTER — HOSPITAL ENCOUNTER (EMERGENCY)
Age: 59
Discharge: HOME | End: 2019-01-27
Attending: EMERGENCY MEDICINE
Payer: COMMERCIAL

## 2019-01-27 VITALS
WEIGHT: 178 LBS | SYSTOLIC BLOOD PRESSURE: 160 MMHG | HEIGHT: 63 IN | OXYGEN SATURATION: 100 % | HEART RATE: 125 BPM | DIASTOLIC BLOOD PRESSURE: 89 MMHG | BODY MASS INDEX: 31.54 KG/M2 | TEMPERATURE: 97.8 F | RESPIRATION RATE: 16 BRPM

## 2019-01-27 DIAGNOSIS — R10.12 ABDOMINAL PAIN, LUQ (LEFT UPPER QUADRANT): Primary | ICD-10-CM

## 2019-01-27 LAB
ALBUMIN SERPL-MCNC: 4 G/DL (ref 3.5–5)
ALBUMIN/GLOB SERPL: 1 {RATIO} (ref 1.1–2.2)
ALP SERPL-CCNC: 84 U/L (ref 45–117)
ALT SERPL-CCNC: 69 U/L (ref 12–78)
ANION GAP SERPL CALC-SCNC: 5 MMOL/L (ref 5–15)
APPEARANCE UR: CLEAR
AST SERPL-CCNC: 51 U/L (ref 15–37)
BACTERIA URNS QL MICRO: NEGATIVE /HPF
BASOPHILS # BLD: 0.1 K/UL (ref 0–0.1)
BASOPHILS NFR BLD: 1 % (ref 0–1)
BILIRUB SERPL-MCNC: 0.2 MG/DL (ref 0.2–1)
BILIRUB UR QL: NEGATIVE
BUN SERPL-MCNC: 16 MG/DL (ref 6–20)
BUN/CREAT SERPL: 23 (ref 12–20)
CALCIUM SERPL-MCNC: 9.7 MG/DL (ref 8.5–10.1)
CHLORIDE SERPL-SCNC: 107 MMOL/L (ref 97–108)
CO2 SERPL-SCNC: 29 MMOL/L (ref 21–32)
COLOR UR: ABNORMAL
CREAT SERPL-MCNC: 0.7 MG/DL (ref 0.55–1.02)
DIFFERENTIAL METHOD BLD: ABNORMAL
EOSINOPHIL # BLD: 0.3 K/UL (ref 0–0.4)
EOSINOPHIL NFR BLD: 6 % (ref 0–7)
EPITH CASTS URNS QL MICRO: ABNORMAL /LPF
ERYTHROCYTE [DISTWIDTH] IN BLOOD BY AUTOMATED COUNT: 16.7 % (ref 11.5–14.5)
GLOBULIN SER CALC-MCNC: 3.9 G/DL (ref 2–4)
GLUCOSE SERPL-MCNC: 81 MG/DL (ref 65–100)
GLUCOSE UR STRIP.AUTO-MCNC: NEGATIVE MG/DL
HCT VFR BLD AUTO: 41.1 % (ref 35–47)
HGB BLD-MCNC: 12.2 G/DL (ref 11.5–16)
HGB UR QL STRIP: NEGATIVE
HYALINE CASTS URNS QL MICRO: ABNORMAL /LPF (ref 0–5)
IMM GRANULOCYTES # BLD AUTO: 0 K/UL (ref 0–0.04)
IMM GRANULOCYTES NFR BLD AUTO: 1 % (ref 0–0.5)
KETONES UR QL STRIP.AUTO: NEGATIVE MG/DL
LEUKOCYTE ESTERASE UR QL STRIP.AUTO: ABNORMAL
LIPASE SERPL-CCNC: 153 U/L (ref 73–393)
LYMPHOCYTES # BLD: 2 K/UL (ref 0.8–3.5)
LYMPHOCYTES NFR BLD: 36 % (ref 12–49)
MCH RBC QN AUTO: 23.2 PG (ref 26–34)
MCHC RBC AUTO-ENTMCNC: 29.7 G/DL (ref 30–36.5)
MCV RBC AUTO: 78.3 FL (ref 80–99)
MONOCYTES # BLD: 0.7 K/UL (ref 0–1)
MONOCYTES NFR BLD: 13 % (ref 5–13)
NEUTS SEG # BLD: 2.4 K/UL (ref 1.8–8)
NEUTS SEG NFR BLD: 44 % (ref 32–75)
NITRITE UR QL STRIP.AUTO: NEGATIVE
NRBC # BLD: 0 K/UL (ref 0–0.01)
NRBC BLD-RTO: 0 PER 100 WBC
PH UR STRIP: 6.5 [PH] (ref 5–8)
PLATELET # BLD AUTO: 309 K/UL (ref 150–400)
PMV BLD AUTO: 10.8 FL (ref 8.9–12.9)
POTASSIUM SERPL-SCNC: 3.2 MMOL/L (ref 3.5–5.1)
PROT SERPL-MCNC: 7.9 G/DL (ref 6.4–8.2)
PROT UR STRIP-MCNC: NEGATIVE MG/DL
RBC # BLD AUTO: 5.25 M/UL (ref 3.8–5.2)
RBC #/AREA URNS HPF: ABNORMAL /HPF (ref 0–5)
SODIUM SERPL-SCNC: 141 MMOL/L (ref 136–145)
SP GR UR REFRACTOMETRY: 1.01 (ref 1–1.03)
UR CULT HOLD, URHOLD: NORMAL
UROBILINOGEN UR QL STRIP.AUTO: 0.2 EU/DL (ref 0.2–1)
WBC # BLD AUTO: 5.6 K/UL (ref 3.6–11)
WBC URNS QL MICRO: ABNORMAL /HPF (ref 0–4)

## 2019-01-27 PROCEDURE — 74177 CT ABD & PELVIS W/CONTRAST: CPT

## 2019-01-27 PROCEDURE — 80053 COMPREHEN METABOLIC PANEL: CPT

## 2019-01-27 PROCEDURE — 36415 COLL VENOUS BLD VENIPUNCTURE: CPT

## 2019-01-27 PROCEDURE — 74011250636 HC RX REV CODE- 250/636: Performed by: PHYSICIAN ASSISTANT

## 2019-01-27 PROCEDURE — 81001 URINALYSIS AUTO W/SCOPE: CPT

## 2019-01-27 PROCEDURE — 99282 EMERGENCY DEPT VISIT SF MDM: CPT

## 2019-01-27 PROCEDURE — 74011636320 HC RX REV CODE- 636/320: Performed by: RADIOLOGY

## 2019-01-27 PROCEDURE — 74011000258 HC RX REV CODE- 258: Performed by: RADIOLOGY

## 2019-01-27 PROCEDURE — 83690 ASSAY OF LIPASE: CPT

## 2019-01-27 PROCEDURE — 96360 HYDRATION IV INFUSION INIT: CPT

## 2019-01-27 PROCEDURE — 85025 COMPLETE CBC W/AUTO DIFF WBC: CPT

## 2019-01-27 RX ORDER — MAGNESIUM CITRATE
296 SOLUTION, ORAL ORAL
Qty: 1 BOTTLE | Refills: 0 | Status: SHIPPED | OUTPATIENT
Start: 2019-01-27 | End: 2019-01-27

## 2019-01-27 RX ORDER — SODIUM CHLORIDE 0.9 % (FLUSH) 0.9 %
10 SYRINGE (ML) INJECTION
Status: COMPLETED | OUTPATIENT
Start: 2019-01-27 | End: 2019-01-27

## 2019-01-27 RX ADMIN — SODIUM CHLORIDE 1000 ML: 900 INJECTION, SOLUTION INTRAVENOUS at 19:45

## 2019-01-27 RX ADMIN — SODIUM CHLORIDE 100 ML: 900 INJECTION, SOLUTION INTRAVENOUS at 20:44

## 2019-01-27 RX ADMIN — IOPAMIDOL 100 ML: 755 INJECTION, SOLUTION INTRAVENOUS at 20:44

## 2019-01-27 RX ADMIN — Medication 10 ML: at 20:44

## 2019-01-27 NOTE — ED TRIAGE NOTES
Patient arrives today with nausea, abd pains x 2 weeks, she reports severe abd pains Wednesday and comes in today feeling full in her LUQ.

## 2019-01-27 NOTE — ED PROVIDER NOTES
58 yo F with hx of hyperlipidemia, DM, HTN, arthritis, anxiety, elevated liver enzymes here for evaluation of abdominal pain. Pain always on left. Symptoms x 2 weeks; worse in past 5 days. Hx of elevated liver enzymes; as recently as Jan 4th. Has been on Keto diet x 23 days. +constipation. Denies fever, cough, SOB, flank pain, urinary symptoms. The history is provided by the patient. Abdominal Pain    The current episode started more than 2 days ago (x 2 weeks; worse since Wed). The problem occurs constantly. Associated symptoms include constipation. Pertinent negatives include no dysuria, no frequency, no hematuria, no headaches, no myalgias, no chest pain and no back pain.         Past Medical History:   Diagnosis Date    Arthritis     degenerative & generalized    Chronic pain     right hip    Diabetes mellitus type 2, controlled ([address] 8/5/2015    Elevated liver enzymes     H/O bone density study 10/12    normal    H/O seasonal allergies     Hypercalcemia 5/8/2017    Hypertension     resolved last in May    Nausea & vomiting     Other and unspecified hyperlipidemia 8/5/2015    Psychiatric disorder     anxiety no meds       Past Surgical History:   Procedure Laterality Date    HX ABDOMINOPLASTY  2003    HX BREAST REDUCTION Bilateral 11/21/2017    BILATERAL BREAST REDUCTION performed by Rita Ross MD at [address] [facility] [address]  [phone]    HX CYST REMOVAL Right 2008    ganglion - wrist    HX GI  11/2011    colonoscopy-normal-10 years-done in [address]    BTL    HX HEENT Right 2007    Lasik    HX ORTHOPAEDIC  06/21/2016    back surgery L3- L5 , screws and rods and plates    HX OTHER SURGICAL Right 1991    plantar wart of FOOT & 3th and 5th toe ? procedure    HX TONSILLECTOMY  1967         Family History:   Problem Relation Age of Onset    Hypertension Mother     Diabetes Mother     Arthritis-osteo Mother     Cancer Father         lung, ? brain    Other Paternal Aunt         ruptured brain aneurysm - HTN    Hypertension Sister         bilat THR    Arthritis-osteo Sister     Coronary Artery Disease Brother         stent    Arthritis-osteo Brother     No Known Problems Brother     Lung Disease Brother     Other Maternal Grandmother         during childbirth    Hypertension Sister     Anesth Problems Neg Hx        Social History     Socioeconomic History    Marital status:      Spouse name: Single    Number of children: 2    Years of education: BA    Highest education level: Not on file   Social Needs    Financial resource strain: Not on file    Food insecurity - worry: Not on file    Food insecurity - inability: Not on file   Algramo needs - medical: Not on file   Algramo needs - non-medical: Not on file   Occupational History    Occupation: NP     Comment: [address]   Tobacco Use    Smoking status: Never Smoker    Smokeless tobacco: Never Used   Substance and Sexual Activity    Alcohol use: Yes     Alcohol/week: 0.0 oz     Comment: very rare    Drug use: No    Sexual activity: Not Currently   Other Topics Concern    Not on file   Social History Narrative    ** Merged History Encounter **              ALLERGIES: Ceclor [cefaclor]; Sulfa (sulfonamide antibiotics); Jacqulin Tan; Micardis [telmisartan]; Azithromycin; Ciprofloxacin; Lexapro [escitalopram]; Losartan; Other medication; Tetracycline; and Zyrtec [cetirizine]    Review of Systems   Constitutional: Negative. HENT: Negative for ear discharge. Eyes: Negative for photophobia, pain, discharge and visual disturbance. Respiratory: Negative for apnea, cough, chest tightness and shortness of breath. Cardiovascular: Negative for chest pain, palpitations and leg swelling. Gastrointestinal: Positive for abdominal pain and constipation. Negative for abdominal distention and blood in stool.    Genitourinary: Negative for difficulty urinating, dysuria, flank pain, frequency and hematuria. Musculoskeletal: Negative for back pain, gait problem, joint swelling, myalgias and neck pain. Skin: Negative for color change and pallor. Neurological: Negative for dizziness, syncope, weakness, numbness and headaches. Psychiatric/Behavioral: Negative for behavioral problems and confusion. The patient is not nervous/anxious. Vitals:    01/27/19 1757   Pulse: 94   SpO2: 98%            Physical Exam   Constitutional: She is oriented to person, place, and time. She appears well-developed and well-nourished. HENT:   Head: Normocephalic and atraumatic. Right Ear: External ear normal.   Left Ear: External ear normal.   Nose: Nose normal.   Mouth/Throat: Oropharynx is clear and moist.   Eyes: Conjunctivae and EOM are normal. Pupils are equal, round, and reactive to light. Right eye exhibits no discharge. Left eye exhibits no discharge. Neck: Normal range of motion. Neck supple. Cardiovascular: Normal rate, regular rhythm, normal heart sounds and intact distal pulses. Pulmonary/Chest: Effort normal and breath sounds normal.   Abdominal: Soft. Bowel sounds are normal. She exhibits no distension. There is tenderness in the left upper quadrant. There is no rebound and no guarding. Musculoskeletal: Normal range of motion. She exhibits no edema or tenderness. Neurological: She is alert and oriented to person, place, and time. No cranial nerve deficit. Coordination normal.   Skin: Skin is warm and dry. No rash noted. Psychiatric: She has a normal mood and affect. Her behavior is normal. Judgment and thought content normal.   Nursing note and vitals reviewed.        MDM  Number of Diagnoses or Management Options  Abdominal pain, LUQ (left upper quadrant):      Amount and/or Complexity of Data Reviewed  Clinical lab tests: ordered and reviewed  Tests in the radiology section of CPT®: ordered and reviewed  Discuss the patient with other providers: yes           Procedures    Patient has been reassessed. Feeling much better. Reviewed labs, medications and radiographics with patient. Ready to discharge home. Will follow up with GI. Discussed case with attending Physician. Agrees with care and will D/C with follow up.      10:24 PM  Patient's results have been reviewed with them. Patient and/or family have verbally conveyed their understanding and agreement of the patient's signs, symptoms, diagnosis, treatment and prognosis and additionally agree to follow up as recommended or return to the Emergency Room should their condition change prior to follow-up. Discharge instructions have also been provided to the patient with some educational information regarding their diagnosis as well a list of reasons why they would want to return to the ER prior to their follow-up appointment should their condition change.   ALISON Hathaway

## 2019-01-28 NOTE — DISCHARGE INSTRUCTIONS

## 2019-03-21 ENCOUNTER — LAB ONLY (OUTPATIENT)
Dept: INTERNAL MEDICINE CLINIC | Age: 59
End: 2019-03-21

## 2019-03-21 DIAGNOSIS — I10 BENIGN ESSENTIAL HYPERTENSION: ICD-10-CM

## 2019-03-21 DIAGNOSIS — E78.5 HYPERLIPIDEMIA WITH TARGET LDL LESS THAN 100: ICD-10-CM

## 2019-03-22 LAB
ALBUMIN SERPL-MCNC: 4.6 G/DL (ref 3.6–4.8)
ALBUMIN/GLOB SERPL: 1.5 {RATIO} (ref 1.2–2.2)
ALP SERPL-CCNC: 85 IU/L (ref 39–117)
ALT SERPL-CCNC: 34 IU/L (ref 0–32)
AST SERPL-CCNC: 29 IU/L (ref 0–40)
BASOPHILS # BLD AUTO: 0 X10E3/UL (ref 0–0.2)
BASOPHILS NFR BLD AUTO: 1 %
BILIRUB SERPL-MCNC: 0.4 MG/DL (ref 0–1.2)
BUN SERPL-MCNC: 15 MG/DL (ref 8–27)
BUN/CREAT SERPL: 19 (ref 12–28)
CALCIUM SERPL-MCNC: 10.4 MG/DL (ref 8.7–10.3)
CHLORIDE SERPL-SCNC: 101 MMOL/L (ref 96–106)
CHOLEST SERPL-MCNC: 212 MG/DL (ref 100–199)
CO2 SERPL-SCNC: 25 MMOL/L (ref 20–29)
CREAT SERPL-MCNC: 0.79 MG/DL (ref 0.57–1)
EOSINOPHIL # BLD AUTO: 0.2 X10E3/UL (ref 0–0.4)
EOSINOPHIL NFR BLD AUTO: 4 %
ERYTHROCYTE [DISTWIDTH] IN BLOOD BY AUTOMATED COUNT: 18.3 % (ref 12.3–15.4)
GLOBULIN SER CALC-MCNC: 3.1 G/DL (ref 1.5–4.5)
GLUCOSE SERPL-MCNC: 129 MG/DL (ref 65–99)
HCT VFR BLD AUTO: 43 % (ref 34–46.6)
HDLC SERPL-MCNC: 48 MG/DL
HGB BLD-MCNC: 13.2 G/DL (ref 11.1–15.9)
IMM GRANULOCYTES # BLD AUTO: 0 X10E3/UL (ref 0–0.1)
IMM GRANULOCYTES NFR BLD AUTO: 0 %
INTERPRETATION, 910389: NORMAL
LDLC SERPL CALC-MCNC: 149 MG/DL (ref 0–99)
LYMPHOCYTES # BLD AUTO: 1.7 X10E3/UL (ref 0.7–3.1)
LYMPHOCYTES NFR BLD AUTO: 33 %
MCH RBC QN AUTO: 23.4 PG (ref 26.6–33)
MCHC RBC AUTO-ENTMCNC: 30.7 G/DL (ref 31.5–35.7)
MCV RBC AUTO: 76 FL (ref 79–97)
MONOCYTES # BLD AUTO: 0.6 X10E3/UL (ref 0.1–0.9)
MONOCYTES NFR BLD AUTO: 11 %
NEUTROPHILS # BLD AUTO: 2.7 X10E3/UL (ref 1.4–7)
NEUTROPHILS NFR BLD AUTO: 51 %
PLATELET # BLD AUTO: 308 X10E3/UL (ref 150–379)
POTASSIUM SERPL-SCNC: 4.5 MMOL/L (ref 3.5–5.2)
PROT SERPL-MCNC: 7.7 G/DL (ref 6–8.5)
RBC # BLD AUTO: 5.65 X10E6/UL (ref 3.77–5.28)
SODIUM SERPL-SCNC: 142 MMOL/L (ref 134–144)
TRIGL SERPL-MCNC: 74 MG/DL (ref 0–149)
VLDLC SERPL CALC-MCNC: 15 MG/DL (ref 5–40)
WBC # BLD AUTO: 5.2 X10E3/UL (ref 3.4–10.8)

## 2019-03-24 NOTE — PROGRESS NOTES
Your calcium is slightly high for some reason. I suggest repeating that test to make sure you don't have hyperparathyroidism. The cholesterol still remains high and I advise starting a statin because of your high sugars. I couldn't do the HgA1c as it had not been 3 months. You should do the HgA1c next month and repeat the calcium.   I am sorry you missed your appointment as we didn't have a change to say good bye and address these numbers

## 2019-03-29 ENCOUNTER — HOSPITAL ENCOUNTER (OUTPATIENT)
Dept: MAMMOGRAPHY | Age: 59
Discharge: HOME | End: 2019-03-29
Payer: COMMERCIAL

## 2019-03-29 ENCOUNTER — OFFICE VISIT (OUTPATIENT)
Dept: PRIMARY CARE CLINIC | Age: 59
End: 2019-03-29

## 2019-03-29 VITALS
TEMPERATURE: 98.3 F | WEIGHT: 179 LBS | SYSTOLIC BLOOD PRESSURE: 133 MMHG | OXYGEN SATURATION: 97 % | DIASTOLIC BLOOD PRESSURE: 74 MMHG | HEIGHT: 63 IN | HEART RATE: 88 BPM | BODY MASS INDEX: 31.71 KG/M2 | RESPIRATION RATE: 18 BRPM

## 2019-03-29 DIAGNOSIS — K59.01 SLOW TRANSIT CONSTIPATION: ICD-10-CM

## 2019-03-29 DIAGNOSIS — I10 ESSENTIAL HYPERTENSION: ICD-10-CM

## 2019-03-29 DIAGNOSIS — Z96.641 STATUS POST RIGHT HIP REPLACEMENT: ICD-10-CM

## 2019-03-29 DIAGNOSIS — E66.9 OBESITY (BMI 30.0-34.9): ICD-10-CM

## 2019-03-29 DIAGNOSIS — R35.0 URINARY FREQUENCY: ICD-10-CM

## 2019-03-29 DIAGNOSIS — E11.69 CONTROLLED TYPE 2 DIABETES MELLITUS WITH OTHER SPECIFIED COMPLICATION, WITHOUT LONG-TERM CURRENT USE OF INSULIN (HCC): Primary | ICD-10-CM

## 2019-03-29 DIAGNOSIS — E78.2 MIXED HYPERLIPIDEMIA: ICD-10-CM

## 2019-03-29 DIAGNOSIS — Z12.39 BREAST CANCER SCREENING: ICD-10-CM

## 2019-03-29 LAB
ALBUMIN UR QL STRIP: 10 MG/L
BILIRUB UR QL STRIP: NEGATIVE
CREATININE, URINE POC: 300 MG/DL
GLUCOSE UR-MCNC: NEGATIVE MG/DL
KETONES P FAST UR STRIP-MCNC: NEGATIVE MG/DL
MICROALBUMIN/CREAT RATIO POC: <30 MG/G
PH UR STRIP: 7 [PH] (ref 4.6–8)
PROT UR QL STRIP: NEGATIVE
SP GR UR STRIP: 1.02 (ref 1–1.03)
UA UROBILINOGEN AMB POC: NORMAL (ref 0.2–1)
URINALYSIS CLARITY POC: CLEAR
URINALYSIS COLOR POC: YELLOW
URINE BLOOD POC: NORMAL
URINE LEUKOCYTES POC: NORMAL
URINE NITRITES POC: NEGATIVE

## 2019-03-29 PROCEDURE — 77063 BREAST TOMOSYNTHESIS BI: CPT

## 2019-03-29 NOTE — PROGRESS NOTES
Visit Vitals  /74 (BP 1 Location: Left arm, BP Patient Position: Sitting)   Pulse 88   Temp 98.3 °F (36.8 °C) (Oral)   Resp 18   Ht 5' 3\" (1.6 m)   Wt 179 lb (81.2 kg)   SpO2 97%   BMI 31.71 kg/m²       Denies   Chief Complaint   Patient presents with   [address]     Patient needs establish a PCP           1. Have you been to the ER, urgent care clinic since your last visit? Hospitalized since your last visit? Denies     2. Have you seen or consulted any other health care providers outside of the [address] [place] [address] since your last visit? Include any pap smears or colon screening.  Denies

## 2019-03-29 NOTE — PROGRESS NOTES
Written by Ken Donohue, as dictated by Dr. Mariana Yang MD.    Mary Rojas is a 59 y.o. female. HPI  The patient comes in today to establish care. She was previously under the care of Dr. Mercedes Lancaster (). Patient has been having constipation and was seen by Dr. Lucy Stein. She was prescribed Linzess for constipation. The patient is still feeling bloated. She had a colonoscopy and had 2 polyps removed. The patient was told that the polyps seemed to be benign, but she has not had a follow-up yet. Labs for celiac were normal. The pt notes that she does not have gastroparesis or diverticulitis. Pt has DM-2 which she attributes to steroid injections for her back. She stopped metformin in 12/2018 and her DM-2 is controlled by diet. The patient tries to check her BS in the morning and before lunch. Patient has found that she is not able to eat after 7:30 pm. Recently her BS has been around 106-144 which she attributes to stress from her daughter living with her. She weighs 179 lbs today and has lost weight from 184 lbs on 01/04/2019. Pt notes that she had lost more weight, but her daughter moved in with her and is causing stress. The patient previously lost 10 lbs on the keto diet. She has not been exercising like she should be. Pt notes that she drinks protein shakes as sometimes she does not have time to eat. Her calcium has been going up and down since 2012. She suspected there was something wrong with her parathyroid, so she saw endocrinology. Pt has not been taking calcium in the past 2 years and is no longer taking vitamin D. She notes that her cholesterol was high even when she followed a vegetarian diet x 3 years. Pt notes that her younger brother had several stents placed. The patient has not had a CT heart. The patient notes that she has multiple rods, screws, plates, and cages in her back. She has DDD thoracic and lumbar.     Patient has been having increased urinary frequency and urinary hesitancy. BP is good today at 133/74. Pt is taking HCTZ 25 mg. She recently had R hip replacement with Dr. Carlton Moeller on 10/08/2018. Pt had home health x 2 weeks and then went to PT. The patient was prescribed flexeril which she takes as needed. .    Last mammogram was in 2017, noting that she had a breast reduction at that time. Prior to breast reduction she was a 42D bra size. 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 Patient Active Problem List   Diagnosis Code    Menopausal state N95.1    DDD (degenerative disc disease), thoracic M51.34    DDD (degenerative disc disease), lumbar M51.36    Pulmonary nodule, left R91.1    Diabetes mellitus type 2, controlled ([address] E11.9    Benign essential hypertension I10    Hyperlipidemia with target LDL less than 100 E78.5    Obesity (BMI 30-39. 9) E66.9    Anxiety disorder F41.9    Status post lumbar surgery Z98.890    Hypercalcemia E83.52    Hx of bilateral breast reduction surgery Z98.890    Osteoarthritis of right hip M16.11        Current Outpatient Medications on File Prior to Visit   Medication Sig Dispense Refill    glucose blood VI test strips (ONETOUCH ULTRA BLUE TEST STRIP) strip Check three times a day. Fluctuating blood sugar 100 Strip 3    lancets misc Use as directed. Dx: E11.9 100 Each 1    senna-docusate (PERICOLACE) 8.6-50 mg per tablet Take 1 Tab by mouth two (2) times a day. 30 Tab 0    hydroCHLOROthiazide (HYDRODIURIL) 25 mg tablet TAKE ONE TABLET BY MOUTH DAILY 90 Tab 3    ascorbic acid/multivit-min (EMERGEN-C PO) Take 1,000 mg by mouth daily.  aspirin delayed-release 81 mg tablet Take  by mouth daily.  TURMERIC PO Take 500 mg by mouth daily.  potassium 99 mg tablet Take 99 mg by mouth every other day.  LUTEIN PO Take 25 mcg by mouth daily.       OTHER bromium 500 mg every morning      OTHER Ginger daily (500 mg)      fish,bora,flax oils-om3,6,9no1 (OMEGA 3-6-9) 1,200 mg cap Take by mouth.  triamcinolone (NASACORT) 55 mcg nasal inhaler 2 Sprays nightly.  loratadine (CLARITIN) 10 mg tablet Take 10 mg by mouth daily.  coenzyme q10 (CO Q-10) 10 mg cap Take 100 mg by mouth daily.  zinc 50 mg tab tablet Take 50 mg by mouth daily.  B.infantis-B.ani-B.long-B.bifi (PROBIOTIC 4X) 10-15 mg TbEC Take  by mouth daily.  ONETOUCH DELICA LANCETS 30 gauge misc       Blood-Glucose Meter monitoring kit Use as directed. Dx: E11.9 1 Kit 0    magnesium 250 mg Tab Take 400 mg by mouth daily.  melatonin 3 mg tablet Take 3 mg by mouth nightly.  hydroCHLOROthiazide (HYDRODIURIL) 25 mg tablet Take 1 Tab by mouth daily. 90 Tab 3     No current facility-administered medications on file prior to visit. Allergies   Allergen Reactions    Ceclor [Cefaclor] Rash     Amilcar Trinidad    Sulfa (Sulfonamide Antibiotics) Anaphylaxis, Shortness of Breath and Rash    Invokana [Canagliflozin] Other (comments)     Alopecia and yeast     Micardis [Telmisartan] Myalgia     Leg muscle pain - subsided with discontinuation    Azithromycin Nausea Only     Pt states she is allergic to all mycin drugs; GI upset    Ciprofloxacin Other (comments)     Headache    Lexapro [Escitalopram] Other (comments)     Pt states she had arm pains and vision changes.  Losartan Cough    Other Medication Other (comments)     DERMABOND GLUE, HAD BREAST REDUCTION AND WOUND ALMOST DEHISCED.     Tetracycline Other (comments)     Stomach issues      Zyrtec [Cetirizine] Other (comments)     Pt states that it makes her heart race       Past Medical History:   Diagnosis Date    Arthritis     degenerative & generalized    Chronic pain     right hip    Diabetes mellitus type 2, controlled ([address] 8/5/2015    Elevated liver enzymes     H/O bone density study 10/12    normal    H/O seasonal allergies     Hypercalcemia 5/8/2017    Hypertension     resolved last in May    Nausea & vomiting     Other and unspecified hyperlipidemia 8/5/2015    Psychiatric disorder     anxiety no meds       Past Surgical History:   Procedure Laterality Date    HX ABDOMINOPLASTY  2003    HX BREAST REDUCTION Bilateral 11/21/2017    BILATERAL BREAST REDUCTION performed by Bharath Vazquez MD at [address]  [facility]  [phone]    HX CYST REMOVAL Right 2008    ganglion - wrist    HX GI  11/2011    colonoscopy-normal-10 years-done in [address]    BTL    HX HEENT Right 2007    Lasik    HX ORTHOPAEDIC  06/21/2016    back surgery L3- L5 , screws and rods and plates    HX OTHER SURGICAL Right 1991    plantar wart of FOOT & 3th and 5th toe ? procedure    HX TONSILLECTOMY  1967       Family History   Problem Relation Age of Onset    Hypertension Mother     Diabetes Mother     Arthritis-osteo Mother     Cancer Father         lung, ?brain    Other Paternal Aunt         ruptured brain aneurysm - HTN    Hypertension Sister         bilat THR    Arthritis-osteo Sister     Coronary Artery Disease Brother         stent    Arthritis-osteo Brother     No Known Problems Brother     Lung Disease Brother     Other Maternal Grandmother         during childbirth    Hypertension Sister     Anesth Problems Neg Hx        Social History     Socioeconomic History    Marital status:      Spouse name: Single    Number of children: 2    Years of education: BA    Highest education level: Not on file   Occupational History    Occupation: NP     Comment: [employer]   Social Needs    Financial resource strain: Not on file    Food insecurity:     Worry: Not on file     Inability: Not on file   [employer] needs:     Medical: Not on file     Non-medical: Not on file   Tobacco Use    Smoking status: Never Smoker    Smokeless tobacco: Never Used   Substance and Sexual Activity    Alcohol use:  Yes     Alcohol/week: 0.0 oz     Comment: very rare    Drug use: No    Sexual activity: Not Currently   Lifestyle    Physical activity:     Days per week: Not on file     Minutes per session: Not on file    Stress: Not on file   Relationships    Social connections:     Talks on phone: Not on file     Gets together: Not on file     Attends [employer] service: Not on file     Active member of club or organization: Not on file     Attends meetings of clubs or organizations: Not on file     Relationship status: Not on file    Intimate partner violence:     Fear of current or ex partner: Not on file     Emotionally abused: Not on file     Physically abused: Not on file     Forced sexual activity: Not on file   Other Topics Concern    Not on file   Social History Narrative    ** Merged History Encounter **            Lab Only on 03/21/2019   Component Date Value Ref Range Status    Glucose 03/21/2019 129* 65 - 99 mg/dL Final    BUN 03/21/2019 15  8 - 27 mg/dL Final    Creatinine 03/21/2019 0.79  0.57 - 1.00 mg/dL Final    GFR est non-AA 03/21/2019 82  >59 mL/min/1.73 Final    GFR est AA 03/21/2019 94  >59 mL/min/1.73 Final    BUN/Creatinine ratio 03/21/2019 19  12 - 28 Final    Sodium 03/21/2019 142  134 - 144 mmol/L Final    Potassium 03/21/2019 4.5  3.5 - 5.2 mmol/L Final    Chloride 03/21/2019 101  96 - 106 mmol/L Final    CO2 03/21/2019 25  20 - 29 mmol/L Final    Calcium 03/21/2019 10.4* 8.7 - 10.3 mg/dL Final    Protein, total 03/21/2019 7.7  6.0 - 8.5 g/dL Final    Albumin 03/21/2019 4.6  3.6 - 4.8 g/dL Final    GLOBULIN, TOTAL 03/21/2019 3.1  1.5 - 4.5 g/dL Final    A-G Ratio 03/21/2019 1.5  1.2 - 2.2 Final    Bilirubin, total 03/21/2019 0.4  0.0 - 1.2 mg/dL Final    Alk.  phosphatase 03/21/2019 85  39 - 117 IU/L Final    AST (SGOT) 03/21/2019 29  0 - 40 IU/L Final    ALT (SGPT) 03/21/2019 34* 0 - 32 IU/L Final    Cholesterol, total 03/21/2019 212* 100 - 199 mg/dL Final    Triglyceride 03/21/2019 74  0 - 149 mg/dL Final    HDL Cholesterol 03/21/2019 48  >39 mg/dL Final    VLDL, calculated 03/21/2019 15  5 - 40 mg/dL Final    LDL, calculated 03/21/2019 149* 0 - 99 mg/dL Final   Office Visit on 01/04/2019   Component Date Value Ref Range Status    WBC 03/21/2019 5.2  3.4 - 10.8 x10E3/uL Final    RBC 03/21/2019 5.65* 3.77 - 5.28 x10E6/uL Final    HGB 03/21/2019 13.2  11.1 - 15.9 g/dL Final    HCT 03/21/2019 43.0  34.0 - 46.6 % Final    MCV 03/21/2019 76* 79 - 97 fL Final    MCH 03/21/2019 23.4* 26.6 - 33.0 pg Final    MCHC 03/21/2019 30.7* 31.5 - 35.7 g/dL Final    RDW 03/21/2019 18.3* 12.3 - 15.4 % Final    PLATELET 93/[birth year]/1962 708  150 - 379 x10E3/uL Final    NEUTROPHILS 03/21/2019 51  Not Estab. % Final    Lymphocytes 03/21/2019 33  Not Estab. % Final    MONOCYTES 03/21/2019 11  Not Estab. % Final    EOSINOPHILS 03/21/2019 4  Not Estab. % Final    BASOPHILS 03/21/2019 1  Not Estab. % Final    ABS. NEUTROPHILS 03/21/2019 2.7  1.4 - 7.0 x10E3/uL Final    Abs Lymphocytes 03/21/2019 1.7  0.7 - 3.1 x10E3/uL Final    ABS. MONOCYTES 03/21/2019 0.6  0.1 - 0.9 x10E3/uL Final    ABS. EOSINOPHILS 03/21/2019 0.2  0.0 - 0.4 x10E3/uL Final    ABS. BASOPHILS 03/21/2019 0.0  0.0 - 0.2 x10E3/uL Final    IMMATURE GRANULOCYTES 03/21/2019 0  Not Estab. % Final    ABS. IMM. GRANS. 03/21/2019 0.0  0.0 - 0.1 x10E3/uL Final       Review of Systems   Constitutional: Negative for malaise/fatigue. HENT: Negative for congestion. Eyes: Negative for blurred vision and pain. Respiratory: Negative for cough and shortness of breath. Cardiovascular: Negative for chest pain and palpitations. Gastrointestinal: Positive for constipation (improved with linzess). Negative for abdominal pain and heartburn.        +abdominal bloating   Genitourinary: Positive for frequency. Negative for urgency. +urinary hesitancy   Musculoskeletal: Negative for joint pain and myalgias. Neurological: Negative for dizziness, tingling, sensory change, weakness and headaches.    Psychiatric/Behavioral: Negative for depression, memory loss and substance abuse. Visit Vitals  /74 (BP 1 Location: Left arm, BP Patient Position: Sitting)   Pulse 88   Temp 98.3 °F (36.8 °C) (Oral)   Resp 18   Ht 5' 3\" (1.6 m)   Wt 179 lb (81.2 kg)   SpO2 97%   BMI 31.71 kg/m²       Physical Exam   Constitutional: She is oriented to person, place, and time. She appears well-developed. No distress. Obese   HENT:   Right Ear: External ear normal.   Left Ear: External ear normal.   Eyes: Conjunctivae and EOM are normal. Right eye exhibits no discharge. Left eye exhibits no discharge. Neck: Normal range of motion. Neck supple. Cardiovascular: Normal rate, regular rhythm and normal heart sounds. Pulses:       Dorsalis pedis pulses are 2+ on the right side, and 2+ on the left side. Pulmonary/Chest: Effort normal and breath sounds normal. She has no wheezes. Abdominal: Soft. Bowel sounds are normal. There is no tenderness. Lymphadenopathy:     She has no cervical adenopathy. Neurological: She is alert and oriented to person, place, and time. Skin: She is not diaphoretic. Psychiatric: She has a normal mood and affect. Her behavior is normal.   Nursing note and vitals reviewed. Diabetic foot exam:     Left: Vibratory sensation normal    Sharp/dull discrimination normal    Filament test normal sensation with micro filament   Pulse DP: 2+ (normal)   Deformities: None  Right: Vibratory sensation normal   Sharp/dull discrimination normal   Filament test normal sensation with micro filament   Pulse DP: 2+ (normal)   Deformities: None      ASSESSMENT and PLAN    ICD-10-CM ICD-9-CM    1.  Controlled type 2 diabetes mellitus with other specified complication, without long-term current use of insulin ([facility]) E11.69 250.80 AMB POC URINE, MICROALBUMIN, SEMIQUANT (3 RESULTS)      AMB POC URINALYSIS DIP STICK AUTO W/O MICRO      HM DIABETES FOOT EXAM    POC microalbumin tested in office: albumin 10, creatinine 300, microalbumin/creat ratio <30. Foot exam performed. No regimen changes at this time. She should return to have labs drawn prior to her next appointment. 2. Essential hypertension I10 401.9 BP is well-controlled on current medication. No change to dosage at this time. She should continue HCTZ 25 mg.   3. Obesity (BMI 30.0-34. 9) E66.9 278.00 Discussed that nuts can be contributing to her weight so she should cut back on her consumption. Advised diet and exercise. 4. Status post right hip replacement Z96.641 V43.64 Doing well after home health and PT. She is no longer on Xarelto. 5. Mixed hyperlipidemia E78.2 272.2 CT HEART W/O CONT WITH CALCIUM    CT heart ordered. 6. Breast cancer screening Z12.31 V76.10 JOHN 3D NASIR W MAMMO BI SCREENING INCL CAD    3D mammogram ordered. 7. Slow transit constipation K59.01 564.01 Compliant on Linzess. 8. Urinary frequency R35.0 788.41 POC UA found: trace blood, trace leukocytes. This plan was reviewed with the patient and patient agrees. All questions were answered. This scribe documentation was reviewed by me and accurately reflects the examination and decisions made by me. This note will not be viewable in [address]

## 2019-03-31 LAB — BACTERIA UR CULT: NORMAL

## 2019-04-03 ENCOUNTER — HOSPITAL ENCOUNTER (OUTPATIENT)
Dept: CT IMAGING | Age: 59
Discharge: HOME | End: 2019-04-03
Attending: INTERNAL MEDICINE
Payer: SELF-PAY

## 2019-04-03 DIAGNOSIS — E78.2 MIXED HYPERLIPIDEMIA: ICD-10-CM

## 2019-04-03 PROCEDURE — 75571 CT HRT W/O DYE W/CA TEST: CPT

## 2019-04-04 NOTE — CARDIO/PULMONARY
Reached patient at her given mobile number and shared her coronary artery CT score of 7 with her. We discussed the meaning of this score. 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 We briefly discussed some of the lifestyle management resources, books/podcasts, offered by Lisa Carranza and Kelly Malcolm. Patient has no further questions at this time.

## 2019-04-05 NOTE — PROGRESS NOTES
Tamica Cheney, your CT heart showed calcium score of 7 which is low but we don`t want plaques to keep building up. I would suggest low carb , Mediterranean diet & exercise for 3 months . Will repeat Lipid panel & see if numbers are  Improving.

## 2019-07-19 ENCOUNTER — OFFICE VISIT (OUTPATIENT)
Dept: PRIMARY CARE CLINIC | Age: 59
End: 2019-07-19

## 2019-07-19 VITALS
RESPIRATION RATE: 16 BRPM | HEIGHT: 63 IN | BODY MASS INDEX: 30.65 KG/M2 | TEMPERATURE: 97.9 F | SYSTOLIC BLOOD PRESSURE: 135 MMHG | DIASTOLIC BLOOD PRESSURE: 86 MMHG | HEART RATE: 71 BPM | WEIGHT: 173 LBS | OXYGEN SATURATION: 99 %

## 2019-07-19 DIAGNOSIS — E11.9 CONTROLLED TYPE 2 DIABETES MELLITUS WITHOUT COMPLICATION, WITHOUT LONG-TERM CURRENT USE OF INSULIN (HCC): Primary | ICD-10-CM

## 2019-07-19 DIAGNOSIS — E11.9 TYPE 2 DIABETES MELLITUS WITHOUT COMPLICATION, UNSPECIFIED WHETHER LONG TERM INSULIN USE (HCC): Primary | ICD-10-CM

## 2019-07-19 DIAGNOSIS — E66.9 OBESITY (BMI 30-39.9): ICD-10-CM

## 2019-07-19 DIAGNOSIS — E05.90 HYPERTHYROIDISM: ICD-10-CM

## 2019-07-19 DIAGNOSIS — E87.6 HYPOKALEMIA: ICD-10-CM

## 2019-07-19 DIAGNOSIS — E87.6 POTASSIUM DEFICIENCY: ICD-10-CM

## 2019-07-19 DIAGNOSIS — R79.89 ABNORMAL CBC: ICD-10-CM

## 2019-07-19 DIAGNOSIS — E78.2 MIXED HYPERLIPIDEMIA: ICD-10-CM

## 2019-07-19 DIAGNOSIS — I10 ESSENTIAL HYPERTENSION: ICD-10-CM

## 2019-07-19 DIAGNOSIS — E78.00 ELEVATED CHOLESTEROL: ICD-10-CM

## 2019-07-19 RX ORDER — METFORMIN HYDROCHLORIDE 500 MG/1
TABLET ORAL 2 TIMES DAILY WITH MEALS
COMMUNITY
End: 2019-07-19 | Stop reason: SINTOL

## 2019-07-19 RX ORDER — INSULIN PUMP SYRINGE, 3 ML
EACH MISCELLANEOUS
Qty: 1 KIT | Refills: 0 | Status: SHIPPED | OUTPATIENT
Start: 2019-07-19

## 2019-07-19 RX ORDER — METFORMIN HYDROCHLORIDE 750 MG/1
750 TABLET, EXTENDED RELEASE ORAL DAILY
Qty: 30 TAB | Refills: 2 | Status: SHIPPED | OUTPATIENT
Start: 2019-07-19 | End: 2019-10-14 | Stop reason: ALTCHOICE

## 2019-07-19 NOTE — PROGRESS NOTES
Written by Stan Baltazar, as dictated by Dr. Lisa Gong MD.    Samantha Coffey is a 62 y.o. female. HPI  The patient presents today for a DM follow-up and fasting labs. Patient is currently taking metformin, but admits that she stopped it while she was following the keto diet. She has been taking 1,000 mg in the evening x 3 weeks and 500 mg in the morning x 3 weeks. The pt reports metformin causes abdominal bloating and constipation. She has been monitoring her BS which she reports was 130 this morning and 120 yesterday morning. Patient has not tried Januvia and notes she is allergic to glipizide. The patient requests a prescription for a glucometer. She weighs 173 lbs today and has lost weight from 179 lbs on 03/29/2019. Patient has been following a healthy diet and exercising. BP is good today at 135/86. Patient alternates taking 1 tab HCTZ 25 mg and 1/2 tab HCTZ 25 mg as her calcium has been elevated. She did not take HCTZ today. CT hear was performed on 04/03/2019: Total calcium score of 7. Patient Active Problem List   Diagnosis Code    Menopausal state N95.1    DDD (degenerative disc disease), thoracic M51.34    DDD (degenerative disc disease), lumbar M51.36    Pulmonary nodule, left R91.1    Diabetes mellitus type 2, controlled ([address] E11.9    Benign essential hypertension I10    Hyperlipidemia with target LDL less than 100 E78.5    Obesity (BMI 30-39. 9) E66.9    Anxiety disorder F41.9    Status post lumbar surgery Z98.890    Hypercalcemia E83.52    Hx of bilateral breast reduction surgery Z98.890    Osteoarthritis of right hip M16.11        Current Outpatient Medications on File Prior to Visit   Medication Sig Dispense Refill    metFORMIN (GLUCOPHAGE) 500 mg tablet Take  by mouth two (2) times daily (with meals).  glucose blood VI test strips (ONETOUCH ULTRA BLUE TEST STRIP) strip Check three times a day.   Fluctuating blood sugar 100 Strip 3    lancets misc Use as directed. Dx: E11.9 100 Each 1    hydroCHLOROthiazide (HYDRODIURIL) 25 mg tablet TAKE ONE TABLET BY MOUTH DAILY 90 Tab 3    aspirin delayed-release 81 mg tablet Take  by mouth daily.  TURMERIC PO Take 500 mg by mouth daily.  potassium 99 mg tablet Take 99 mg by mouth every other day.  LUTEIN PO Take 25 mcg by mouth daily.  OTHER bromium 500 mg every morning      fish,bora,flax oils-om3,6,9no1 (OMEGA 3-6-9) 1,200 mg cap Take  by mouth.  coenzyme q10 (CO Q-10) 10 mg cap Take 100 mg by mouth daily.  zinc 50 mg tab tablet Take 50 mg by mouth daily.  B.infantis-B.ani-B.long-B.bifi (PROBIOTIC 4X) 10-15 mg TbEC Take  by mouth daily.  ONETOUCH DELICA LANCETS 30 gauge misc       magnesium 250 mg Tab Take 400 mg by mouth daily.  melatonin 3 mg tablet Take 3 mg by mouth nightly.  senna-docusate (PERICOLACE) 8.6-50 mg per tablet Take 1 Tab by mouth two (2) times a day. 30 Tab 0    ascorbic acid/multivit-min (EMERGEN-C PO) Take 1,000 mg by mouth daily.  OTHER Berta daily (500 mg)      loratadine (CLARITIN) 10 mg tablet Take 10 mg by mouth daily. No current facility-administered medications on file prior to visit. Allergies   Allergen Reactions    Ceclor [Cefaclor] Rash     Amilcar Trinidad    Sulfa (Sulfonamide Antibiotics) Anaphylaxis, Shortness of Breath and Rash    Invokana [Canagliflozin] Other (comments)     Alopecia and yeast     Micardis [Telmisartan] Myalgia     Leg muscle pain - subsided with discontinuation    Azithromycin Nausea Only     Pt states she is allergic to all mycin drugs; GI upset    Ciprofloxacin Other (comments)     Headache    Lexapro [Escitalopram] Other (comments)     Pt states she had arm pains and vision changes.  Losartan Cough    Other Medication Other (comments)     DERMABOND GLUE, HAD BREAST REDUCTION AND WOUND ALMOST DEHISCED.     Tetracycline Other (comments)     Stomach issues      Zyrtec [Cetirizine] Other (comments)     Pt states that it makes her heart race       Past Medical History:   Diagnosis Date    Arthritis     degenerative & generalized    Chronic pain     right hip    Diabetes mellitus type 2, controlled ([address] 8/5/2015    Elevated liver enzymes     H/O bone density study 10/12    normal    H/O seasonal allergies     Hypercalcemia 5/8/2017    Hypertension     resolved last in May    Nausea & vomiting     Other and unspecified hyperlipidemia 8/5/2015    Psychiatric disorder     anxiety no meds       Past Surgical History:   Procedure Laterality Date    HX ABDOMINOPLASTY  2003    HX BREAST REDUCTION Bilateral 11/21/2017    BILATERAL BREAST REDUCTION performed by Linden Ramos MD at [address]  [place] [address]    HX CYST REMOVAL Right 2008    ganglion - wrist    HX GI  11/2011    colonoscopy-normal-10 years-done in [address]    BTL    HX HEENT Right 2007    Lasik    HX ORTHOPAEDIC  06/21/2016    back surgery L3- L5 , screws and rods and plates    HX OTHER SURGICAL Right 1991    plantar wart of FOOT & 3th and 5th toe ? procedure    HX TONSILLECTOMY  1967       Family History   Problem Relation Age of Onset    Hypertension Mother     Diabetes Mother     Arthritis-osteo Mother     Cancer Father         lung, ?brain    Other Paternal Aunt         ruptured brain aneurysm - HTN    Hypertension Sister         bilat THR    Arthritis-osteo Sister     Coronary Artery Disease Brother         stent    Arthritis-osteo Brother     No Known Problems Brother     Lung Disease Brother     Other Maternal Grandmother         during childbirth    Hypertension Sister     Anesth Problems Neg Hx        Social History     Socioeconomic History    Marital status:      Spouse name: Single    Number of children: 2    Years of education: BA    Highest education level: Not on file   Occupational History    Occupation: NP Comment: [address]   Social Needs    Financial resource strain: Not on file    Food insecurity:     Worry: Not on file     Inability: Not on file    Transportation needs:     Medical: Not on file     Non-medical: Not on file   Tobacco Use    Smoking status: Never Smoker    Smokeless tobacco: Never Used   Substance and Sexual Activity    Alcohol use: Yes     Alcohol/week: 0.0 standard drinks     Comment: very rare    Drug use: No    Sexual activity: Not Currently   Lifestyle    Physical activity:     Days per week: Not on file     Minutes per session: Not on file    Stress: Not on file   Relationships    Social connections:     Talks on phone: Not on file     Gets together: Not on file     Attends Scientology service: Not on file     Active member of club or organization: Not on file     Attends meetings of clubs or organizations: Not on file     Relationship status: Not on file    Intimate partner violence:     Fear of current or ex partner: Not on file     Emotionally abused: Not on file     Physically abused: Not on file     Forced sexual activity: Not on file   Other Topics Concern    Not on file   Social History Narrative    ** Merged History Encounter **            Review of Systems   Constitutional: Positive for weight loss (intentional). Negative for malaise/fatigue. Respiratory: Negative for cough and shortness of breath. Cardiovascular: Negative for chest pain and palpitations. Gastrointestinal: Positive for constipation. Negative for abdominal pain and heartburn.        +abdominal bloating   Musculoskeletal: Negative for joint pain and myalgias. Neurological: Negative for dizziness, tingling, sensory change, weakness and headaches. Psychiatric/Behavioral: Negative for depression, memory loss and substance abuse.      Visit Vitals  /86 (BP 1 Location: Left arm, BP Patient Position: Sitting)   Pulse 71   Temp 97.9 °F (36.6 °C) (Oral)   Resp 16   Ht 5' 3\" (1.6 m)   Wt 173 lb (78.5 kg)   SpO2 99%   BMI 30.65 kg/m²       Physical Exam   Constitutional: She is oriented to person, place, and time. She appears well-developed. No distress. Obese   HENT:   Right Ear: External ear normal.   Left Ear: External ear normal.   Eyes: Conjunctivae and EOM are normal. Right eye exhibits no discharge. Left eye exhibits no discharge. Neck: Normal range of motion. Neck supple. Cardiovascular: Normal rate, regular rhythm and normal heart sounds. Pulmonary/Chest: Effort normal and breath sounds normal. She has no wheezes. Abdominal: Soft. Bowel sounds are normal. There is no tenderness. Lymphadenopathy:     She has no cervical adenopathy. Neurological: She is alert and oriented to person, place, and time. Skin: She is not diaphoretic. Psychiatric: She has a normal mood and affect. Her behavior is normal.   Nursing note and vitals reviewed. ASSESSMENT and PLAN    ICD-10-CM ICD-9-CM    1. Controlled type 2 diabetes mellitus without complication, without long-term current use of insulin (HCC) E11.9 250.00 Blood-Glucose Meter monitoring kit sent to pharmacy. SITagliptin (JANUVIA) 50 mg tablet sent to pharmacy. metFORMIN ER (GLUCOPHAGE XR) 750 mg tablet sent to pharmacy. glucose blood VI test strips (BLOOD GLUCOSE TEST) strip sent to pharmacy. Blood-glucose monitoring test kit and strips prescribed. Januvia 50 mg prescribed. Potential side effects were discussed. She should take 1 tab PO in the evening. Metformin  mg prescribed. Potential side effects were discussed. She should take 1 tab PO in the morning. 2. Mixed hyperlipidemia E78.2 272.2 Advised diet and exercise   3. Hypokalemia E87.6 276.8 Patient alternates 1 tab and 1/2 tab HCTZ 25 mg. Recommended eating banana. 4. Essential hypertension I10 401.9 BP is well-controlled on current medication. No change to regimen at this time. This plan was reviewed with the patient and patient agrees.  All questions were answered. This scribe documentation was reviewed by me and accurately reflects the examination and decisions made by me. This note will not be viewable in [address]

## 2019-07-20 LAB
ALBUMIN SERPL-MCNC: 4.4 G/DL (ref 3.6–4.8)
ALBUMIN/GLOB SERPL: 1.7 {RATIO} (ref 1.2–2.2)
ALP SERPL-CCNC: 83 IU/L (ref 39–117)
ALT SERPL-CCNC: 23 IU/L (ref 0–32)
AST SERPL-CCNC: 26 IU/L (ref 0–40)
BILIRUB SERPL-MCNC: 0.4 MG/DL (ref 0–1.2)
BUN SERPL-MCNC: 11 MG/DL (ref 8–27)
BUN/CREAT SERPL: 15 (ref 12–28)
CALCIUM SERPL-MCNC: 9.9 MG/DL (ref 8.7–10.3)
CHLORIDE SERPL-SCNC: 100 MMOL/L (ref 96–106)
CHOLEST SERPL-MCNC: 199 MG/DL (ref 100–199)
CO2 SERPL-SCNC: 26 MMOL/L (ref 20–29)
CREAT SERPL-MCNC: 0.74 MG/DL (ref 0.57–1)
ERYTHROCYTE [DISTWIDTH] IN BLOOD BY AUTOMATED COUNT: 16.9 % (ref 12.3–15.4)
EST. AVERAGE GLUCOSE BLD GHB EST-MCNC: 148 MG/DL
GLOBULIN SER CALC-MCNC: 2.6 G/DL (ref 1.5–4.5)
GLUCOSE SERPL-MCNC: 114 MG/DL (ref 65–99)
HBA1C MFR BLD: 6.8 % (ref 4.8–5.6)
HCT VFR BLD AUTO: 38.4 % (ref 34–46.6)
HDLC SERPL-MCNC: 51 MG/DL
HGB BLD-MCNC: 12.3 G/DL (ref 11.1–15.9)
LDLC SERPL CALC-MCNC: 133 MG/DL (ref 0–99)
MCH RBC QN AUTO: 24.2 PG (ref 26.6–33)
MCHC RBC AUTO-ENTMCNC: 32 G/DL (ref 31.5–35.7)
MCV RBC AUTO: 75 FL (ref 79–97)
PLATELET # BLD AUTO: 300 X10E3/UL (ref 150–450)
POTASSIUM SERPL-SCNC: 4.2 MMOL/L (ref 3.5–5.2)
PROT SERPL-MCNC: 7 G/DL (ref 6–8.5)
RBC # BLD AUTO: 5.09 X10E6/UL (ref 3.77–5.28)
SODIUM SERPL-SCNC: 143 MMOL/L (ref 134–144)
TRIGL SERPL-MCNC: 77 MG/DL (ref 0–149)
TSH SERPL DL<=0.005 MIU/L-ACNC: 2.18 UIU/ML (ref 0.45–4.5)
VLDLC SERPL CALC-MCNC: 15 MG/DL (ref 5–40)
WBC # BLD AUTO: 4 X10E3/UL (ref 3.4–10.8)

## 2019-10-14 ENCOUNTER — OFFICE VISIT (OUTPATIENT)
Dept: PRIMARY CARE CLINIC | Age: 59
End: 2019-10-14

## 2019-10-14 VITALS
SYSTOLIC BLOOD PRESSURE: 124 MMHG | OXYGEN SATURATION: 100 % | HEIGHT: 63 IN | TEMPERATURE: 98.2 F | DIASTOLIC BLOOD PRESSURE: 80 MMHG | RESPIRATION RATE: 16 BRPM | BODY MASS INDEX: 31.29 KG/M2 | WEIGHT: 176.6 LBS | HEART RATE: 83 BPM

## 2019-10-14 DIAGNOSIS — E11.9 CONTROLLED TYPE 2 DIABETES MELLITUS WITHOUT COMPLICATION, WITHOUT LONG-TERM CURRENT USE OF INSULIN (HCC): Primary | ICD-10-CM

## 2019-10-14 DIAGNOSIS — E66.9 OBESITY (BMI 30.0-34.9): ICD-10-CM

## 2019-10-14 DIAGNOSIS — I10 BENIGN ESSENTIAL HYPERTENSION: ICD-10-CM

## 2019-10-14 DIAGNOSIS — K59.04 CHRONIC IDIOPATHIC CONSTIPATION: ICD-10-CM

## 2019-10-14 NOTE — PROGRESS NOTES
Identified pt with two pt identifiers(name and ). Reviewed record in preparation for visit and have obtained necessary documentation. Chief Complaint   Patient presents with    Diabetes     Follow up        Health Maintenance Due   Topic    Pneumococcal 0-64 years (1 of 1 - PPSV23)    Shingrix Vaccine Age 50> (1 of 2)    DTaP/Tdap/Td series (2 - Td)    Influenza Age 5 to Adult     EYE EXAM RETINAL OR DILATED        Coordination of Care Questionnaire:  :   1) Have you been to an emergency room, urgent care, or hospitalized since your last visit? If yes, where when, and reason for visit? no       2. Have seen or consulted any other health care provider since your last visit? If yes, where when, and reason for visit? NO      3) Do you have an Advanced Directive/ Living Will in place? NO  If yes, do we have a copy on file NO  If no, would you like information NO    Patient is accompanied by self and patient I have received verbal consent from Nelsy Ford to discuss any/all medical information while they are present in the room.

## 2019-10-14 NOTE — PROGRESS NOTES
Written by Delia Gutierrez, as dictated by Dr. Grace Thapa MD.    Clark Mckinley is a 62 y.o. female. HPI  The patient presents today for a DM-2 follow-up. Patient is not fasting for labs. Her BS at home have been doing well, and this morning , it was 118. She has been eating a Vegetarian diet x 3 weeks, and has stopped doing the keto diet, as it was increasing her cholesterol. Compliant Metformin  mg at night. She stopped taking Januvia 50 mg, as it made her slugglish and tired. She has gained weight. She weighs 176 lbs today and weighed 173 lbs on 07/19/19. She feels like the weight has better distribution. She is trying to go back to a plant based diet as she did not have as many issues when she was a vegetarian. She usually uses her elliptical and standing bike at home. She reports having chronic constipation, and has been eating more fiber and is taking Magnesium. She is also taking Melatonin, Probiotics, and Potassium. She believes she has Sjogren's Syndrome, as she has dry eyes. She had a genetic  test done, that notes she was more likely to develop Sjogren's in the future. She had Lasix done on her R eye, and is taking Omega 3 PRN and uses Refresh with Omega 3. Her BP in office looks good. She did not take HCTZ 25 mg this morning. Denies dizziness. She has received the first dose of Shingrix on 08/05/19. She used to work at the [employer], but had a disagreement, and is now on Self-Pay. She is working to find a new job. Patient Active Problem List   Diagnosis Code    Menopausal state N95.1    DDD (degenerative disc disease), thoracic M51.34    DDD (degenerative disc disease), lumbar M51.36    Pulmonary nodule, left R91.1    Diabetes mellitus type 2, controlled ([address] E11.9    Benign essential hypertension I10    Hyperlipidemia with target LDL less than 100 E78.5    Obesity (BMI 30-39. 9) E66.9    Anxiety disorder F41.9    Status post lumbar surgery Z98.890    Hypercalcemia E83.52    Hx of bilateral breast reduction surgery Z98.890    Osteoarthritis of right hip M16.11        Current Outpatient Medications on File Prior to Visit   Medication Sig Dispense Refill    metFORMIN ER (GLUCOPHAGE XR) 750 mg tablet Take 1 Tab by mouth daily for 90 days. 60 Tab 2    Blood-Glucose Meter monitoring kit Check blood sugar 3 times a day 1 Kit 0    glucose blood VI test strips (BLOOD GLUCOSE TEST) strip Test as directed in clinic. Dx: Diabetes Mellitus 100 Strip 0    glucose blood VI test strips (ONETOUCH ULTRA BLUE TEST STRIP) strip Check three times a day. Fluctuating blood sugar 100 Strip 3    lancets misc Use as directed. Dx: E11.9 100 Each 1    senna-docusate (PERICOLACE) 8.6-50 mg per tablet Take 1 Tab by mouth two (2) times a day. 30 Tab 0    hydroCHLOROthiazide (HYDRODIURIL) 25 mg tablet TAKE ONE TABLET BY MOUTH DAILY 90 Tab 3    ascorbic acid/multivit-min (EMERGEN-C PO) Take 1,000 mg by mouth daily.  aspirin delayed-release 81 mg tablet Take  by mouth daily.  TURMERIC PO Take 500 mg by mouth daily.  potassium 99 mg tablet Take 99 mg by mouth every other day.  LUTEIN PO Take 25 mcg by mouth daily.  OTHER Berta daily (500 mg)      loratadine (CLARITIN) 10 mg tablet Take 10 mg by mouth daily.  coenzyme q10 (CO Q-10) 10 mg cap Take 100 mg by mouth daily.  zinc 50 mg tab tablet Take 50 mg by mouth daily.  B.infantis-B.ani-B.long-B.bifi (PROBIOTIC 4X) 10-15 mg TbEC Take  by mouth daily.  ONETOUCH DELICA LANCETS 30 gauge misc       magnesium 250 mg Tab Take 400 mg by mouth daily.  melatonin 3 mg tablet Take 3 mg by mouth nightly.  [DISCONTINUED] SITagliptin (JANUVIA) 50 mg tablet Take 1 Tab by mouth daily for 90 days. 30 Tab 2    [DISCONTINUED] metFORMIN ER (GLUCOPHAGE XR) 750 mg tablet Take 1 Tab by mouth daily for 90 days.  30 Tab 2    OTHER bromium 500 mg every morning      fish,bora,flax oils-om3,6,9no1 (OMEGA 3-6-9) 1,200 mg cap Take  by mouth. No current facility-administered medications on file prior to visit. Allergies   Allergen Reactions    Ceclor [Cefaclor] Rash     Amilcar Trinidad    Sulfa (Sulfonamide Antibiotics) Anaphylaxis, Shortness of Breath and Rash    Invokana [Canagliflozin] Other (comments)     Alopecia and yeast     Micardis [Telmisartan] Myalgia     Leg muscle pain - subsided with discontinuation    Azithromycin Nausea Only     Pt states she is allergic to all mycin drugs; GI upset    Ciprofloxacin Other (comments)     Headache    Lexapro [Escitalopram] Other (comments)     Pt states she had arm pains and vision changes.  Losartan Cough    Other Medication Other (comments)     DERMABOND GLUE, HAD BREAST REDUCTION AND WOUND ALMOST DEHISCED.     Tetracycline Other (comments)     Stomach issues      Zyrtec [Cetirizine] Other (comments)     Pt states that it makes her heart race       Past Medical History:   Diagnosis Date    Arthritis     degenerative & generalized    Chronic pain     right hip    Diabetes mellitus type 2, controlled ([facility] [address] [address] 8/5/2015    Elevated liver enzymes     H/O bone density study 10/12    normal    H/O seasonal allergies     Hypercalcemia 5/8/2017    Hypertension     resolved last in May    Nausea & vomiting     Other and unspecified hyperlipidemia 8/5/2015    Psychiatric disorder     anxiety no meds       Past Surgical History:   Procedure Laterality Date    HX ABDOMINOPLASTY  2003    HX BREAST REDUCTION Bilateral 11/21/2017    BILATERAL BREAST REDUCTION performed by Zuleyka Schafer MD at [address]  [id number]    HX CYST REMOVAL Right 2008    ganglion - wrist    HX GI  11/2011    colonoscopy-normal-10 years-done [address]    BTL    HX HEENT Right 2007    Lasik    HX ORTHOPAEDIC  06/21/2016    back surgery L3- L5 , screws and rods and plates    HX OTHER SURGICAL Right 1991    plantar wart of FOOT & 3th and 5th toe ? procedure    HX TONSILLECTOMY  1967       Family History   Problem Relation Age of Onset    Hypertension Mother     Diabetes Mother     Arthritis-osteo Mother     Cancer Father         lung, ?brain    Other Paternal Aunt         ruptured brain aneurysm - HTN    Hypertension Sister         bilat THR    Arthritis-osteo Sister     Coronary Artery Disease Brother         stent    Arthritis-osteo Brother     No Known Problems Brother     Lung Disease Brother     Other Maternal Grandmother         during childbirth    Hypertension Sister     Anesth Problems Neg Hx        Social History     Socioeconomic History    Marital status:      Spouse name: Single    Number of children: 2    Years of education: BA    Highest education level: Not on file   Occupational History    Occupation: NP     Comment: [employer]   Social Needs    Financial resource strain: Not on file    Food insecurity:     Worry: Not on file     Inability: Not on file   [employer] needs:     Medical: Not on file     Non-medical: Not on file   Tobacco Use    Smoking status: Never Smoker    Smokeless tobacco: Never Used   Substance and Sexual Activity    Alcohol use:  Yes     Alcohol/week: 0.0 standard drinks     Comment: very rare    Drug use: No    Sexual activity: Not Currently   Lifestyle    Physical activity:     Days per week: Not on file     Minutes per session: Not on file    Stress: Not on file   Relationships    Social connections:     Talks on phone: Not on file     Gets together: Not on file     Attends Zoroastrian service: Not on file     Active member of club or organization: Not on file     Attends meetings of clubs or organizations: Not on file     Relationship status: Not on file    Intimate partner violence:     Fear of current or ex partner: Not on file     Emotionally abused: Not on file     Physically abused: Not on file     Forced sexual activity: Not on file   Other Topics Concern    Not on file   Social History Narrative    ** Merged History Encounter **            Orders Only on 07/19/2019   Component Date Value Ref Range Status    Glucose 07/19/2019 114* 65 - 99 mg/dL Final    BUN 07/19/2019 11  8 - 27 mg/dL Final    Creatinine 07/19/2019 0.74  0.57 - 1.00 mg/dL Final    GFR est non-AA 07/19/2019 88  >59 mL/min/1.73 Final    GFR est AA 07/19/2019 101  >59 mL/min/1.73 Final    BUN/Creatinine ratio 07/19/2019 15  12 - 28 Final    Sodium 07/19/2019 143  134 - 144 mmol/L Final    Potassium 07/19/2019 4.2  3.5 - 5.2 mmol/L Final    Chloride 07/19/2019 100  96 - 106 mmol/L Final    CO2 07/19/2019 26  20 - 29 mmol/L Final    Calcium 07/19/2019 9.9  8.7 - 10.3 mg/dL Final    Protein, total 07/19/2019 7.0  6.0 - 8.5 g/dL Final    Albumin 07/19/2019 4.4  3.6 - 4.8 g/dL Final    GLOBULIN, TOTAL 07/19/2019 2.6  1.5 - 4.5 g/dL Final    A-G Ratio 07/19/2019 1.7  1.2 - 2.2 Final    Bilirubin, total 07/19/2019 0.4  0.0 - 1.2 mg/dL Final    Alk.  phosphatase 07/19/2019 83  39 - 117 IU/L Final    AST (SGOT) 07/19/2019 26  0 - 40 IU/L Final    ALT (SGPT) 07/19/2019 23  0 - 32 IU/L Final    TSH 07/19/2019 2.180  0.450 - 4.500 uIU/mL Final    Cholesterol, total 07/19/2019 199  100 - 199 mg/dL Final    Triglyceride 07/19/2019 77  0 - 149 mg/dL Final    HDL Cholesterol 07/19/2019 51  >39 mg/dL Final    VLDL, calculated 07/19/2019 15  5 - 40 mg/dL Final    LDL, calculated 07/19/2019 133* 0 - 99 mg/dL Final    WBC 07/19/2019 4.0  3.4 - 10.8 x10E3/uL Final    RBC 07/19/2019 5.09  3.77 - 5.28 x10E6/uL Final    HGB 07/19/2019 12.3  11.1 - 15.9 g/dL Final    HCT 07/19/2019 38.4  34.0 - 46.6 % Final    MCV 07/19/2019 75* 79 - 97 fL Final    MCH 07/19/2019 24.2* 26.6 - 33.0 pg Final    MCHC 07/19/2019 32.0  31.5 - 35.7 g/dL Final    RDW 07/19/2019 16.9* 12.3 - 15.4 % Final    PLATELET 68/98/1637 366  150 - 450 x10E3/uL Final    Hemoglobin A1c 07/19/2019 6.8* 4.8 - 5.6 % Final    Estimated average glucose 07/19/2019 148  mg/dL Final       Review of Systems   Respiratory: Negative for shortness of breath. Musculoskeletal: Negative for joint pain and myalgias. Neurological: Negative for dizziness and headaches. Psychiatric/Behavioral: Negative for depression and substance abuse. The patient is not nervous/anxious and does not have insomnia. Visit Vitals  /80 (BP 1 Location: Left arm, BP Patient Position: Sitting)   Pulse 83   Temp 98.2 °F (36.8 °C) (Oral)   Resp 16   Ht 5' 3\" (1.6 m)   Wt 176 lb 9.6 oz (80.1 kg)   SpO2 100%   BMI 31.28 kg/m²       Physical Exam   Constitutional: She is oriented to person, place, and time. She appears well-developed. No distress. obese   HENT:   Head: Normocephalic and atraumatic. Right Ear: External ear normal.   Left Ear: External ear normal.   Eyes: Pupils are equal, round, and reactive to light. Conjunctivae and EOM are normal. Right eye exhibits no discharge. Left eye exhibits no discharge. Neck: Normal range of motion. Neck supple. Cardiovascular: Normal rate, regular rhythm and normal heart sounds. Exam reveals no gallop and no friction rub. No murmur heard. Pulmonary/Chest: Effort normal and breath sounds normal. She has no wheezes. Abdominal: Soft. Bowel sounds are normal. There is no tenderness. Musculoskeletal: Normal range of motion. Neurological: She is alert and oriented to person, place, and time. She has normal reflexes. Skin: She is not diaphoretic. Psychiatric: She has a normal mood and affect. Her behavior is normal. Thought content normal.   Nursing note and vitals reviewed. ASSESSMENT and PLAN    ICD-10-CM ICD-9-CM    1. Controlled type 2 diabetes mellitus without complication, without long-term current use of insulin ([facility]) W15.9 128.54 METABOLIC PANEL, COMPREHENSIVE      HEMOGLOBIN A1C WITH EAG    CMP and Hemoglobin A1c ordered.    2. Benign essential hypertension I10 401.1 BP is well-controlled on current medication. No change to dosage at this time. 3. Obesity (BMI 30.0-34. 9) E66.9 278.00 Encouraged diet and exercise. This plan was reviewed with the patient and patient agrees. All questions were answered. This scribe documentation was reviewed by me and accurately reflects the examination and decisions made by me. This note will not be viewable in [address]

## 2019-10-15 LAB
ALBUMIN SERPL-MCNC: 4.4 G/DL (ref 3.6–4.8)
ALBUMIN/GLOB SERPL: 1.7 {RATIO} (ref 1.2–2.2)
ALP SERPL-CCNC: 77 IU/L (ref 39–117)
ALT SERPL-CCNC: 48 IU/L (ref 0–32)
AST SERPL-CCNC: 37 IU/L (ref 0–40)
BILIRUB SERPL-MCNC: <0.2 MG/DL (ref 0–1.2)
BUN SERPL-MCNC: 13 MG/DL (ref 8–27)
BUN/CREAT SERPL: 18 (ref 12–28)
CALCIUM SERPL-MCNC: 9.7 MG/DL (ref 8.7–10.3)
CHLORIDE SERPL-SCNC: 100 MMOL/L (ref 96–106)
CO2 SERPL-SCNC: 22 MMOL/L (ref 20–29)
CREAT SERPL-MCNC: 0.72 MG/DL (ref 0.57–1)
EST. AVERAGE GLUCOSE BLD GHB EST-MCNC: 146 MG/DL
GLOBULIN SER CALC-MCNC: 2.6 G/DL (ref 1.5–4.5)
GLUCOSE SERPL-MCNC: 148 MG/DL (ref 65–99)
HBA1C MFR BLD: 6.7 % (ref 4.8–5.6)
POTASSIUM SERPL-SCNC: 4.6 MMOL/L (ref 3.5–5.2)
PROT SERPL-MCNC: 7 G/DL (ref 6–8.5)
SODIUM SERPL-SCNC: 142 MMOL/L (ref 134–144)

## 2019-10-15 NOTE — PROGRESS NOTES
Sonido Terry, your Hba1C has slightly improved but liver enzyme still on the higher side. Keep up the good work!

## 2020-05-01 DIAGNOSIS — E11.9 CONTROLLED TYPE 2 DIABETES MELLITUS WITHOUT COMPLICATION, UNSPECIFIED WHETHER LONG TERM INSULIN USE (HCC): ICD-10-CM

## 2020-05-01 DIAGNOSIS — I10 BENIGN ESSENTIAL HYPERTENSION: ICD-10-CM

## 2020-05-01 RX ORDER — HYDROCHLOROTHIAZIDE 25 MG/1
TABLET ORAL
Qty: 90 TAB | Refills: 3 | Status: SHIPPED | OUTPATIENT
Start: 2020-05-01 | End: 2021-10-29

## 2020-05-01 RX ORDER — METFORMIN HYDROCHLORIDE 750 MG/1
750 TABLET, EXTENDED RELEASE ORAL DAILY
Qty: 60 TAB | Refills: 2 | Status: SHIPPED | OUTPATIENT
Start: 2020-05-01 | End: 2020-11-04

## 2020-05-01 NOTE — TELEPHONE ENCOUNTER
Last office visit 10/14/2019  Last med refill  Metformin 8/21/2019  Hydrochlorothiazide 10/9/2019 [facility]

## 2020-05-08 ENCOUNTER — VIRTUAL VISIT (OUTPATIENT)
Dept: PRIMARY CARE CLINIC | Age: 60
End: 2020-05-08

## 2020-05-08 DIAGNOSIS — E11.9 CONTROLLED TYPE 2 DIABETES MELLITUS WITHOUT COMPLICATION, WITHOUT LONG-TERM CURRENT USE OF INSULIN (HCC): ICD-10-CM

## 2020-05-08 DIAGNOSIS — E66.9 OBESITY (BMI 30-39.9): ICD-10-CM

## 2020-05-08 DIAGNOSIS — I10 BENIGN ESSENTIAL HYPERTENSION: Primary | ICD-10-CM

## 2020-05-08 NOTE — PROGRESS NOTES
Written by Risa Greenberg, as dictated by Dr. Isaac Sherwood MD.    Herminio Goins is a 56 y.o. female. HPI  I was in the office while conducting this encounter. Consent:  She and/or her healthcare decision maker is aware that this patient-initiated Telehealth encounter is a billable service, with coverage as determined by her insurance carrier. She is aware that she may receive a bill and has provided verbal consent to proceed: Yes    This virtual visit was conducted via Virool. Pursuant to the emergency declaration under the [facility], [facility] waiver authority and the [employer] Act, this Virtual  Visit was conducted to reduce the patient's risk of exposure to COVID-19 and provide continuity of care for an established patient. Services were provided through a video synchronous discussion virtually to substitute for in-person clinic visit. Due to this being a TeleHealth evaluation, many elements of the physical examination are unable to be assessed. The patient presents today for a follow-up. 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 She wears proper PPE while at work and works in the [employer] most of the days. She is trying to start her own business which is little stressful. . She has been feeling well otherwise  but has  gained some weight because the nursing home she works at does not allow her to take certain food in. She was previously doing intermittent fasting when she lived in [place], [place]. She has not had time to exercise because she drives over an hour to her job every day. She has not been feeling fatigued. She has a hx of diabetes. Her hemoglobin A1C was 6.7 in October 2019. She is compliant on Metformin  mg but not with diet lately. She has a hx of hypertension.  She is compliant on HCTZ 25 mg. Of note, she is working at the Medical facility at the [facility]. Patient Active Problem List   Diagnosis Code    Menopausal state N95.1    DDD (degenerative disc disease), thoracic M51.34    DDD (degenerative disc disease), lumbar M51.36    Pulmonary nodule, left R91.1    Diabetes mellitus type 2, controlled ([address] E11.9    Benign essential hypertension I10    Hyperlipidemia with target LDL less than 100 E78.5    Obesity (BMI 30-39. 9) E66.9    Anxiety disorder F41.9    Status post lumbar surgery Z98.890    Hypercalcemia E83.52    Hx of bilateral breast reduction surgery Z98.890    Osteoarthritis of right hip M16.11        Current Outpatient Medications on File Prior to Visit   Medication Sig Dispense Refill    hydroCHLOROthiazide (HYDRODIURIL) 25 mg tablet TAKE ONE TABLET BY MOUTH DAILY 90 Tab 3    metFORMIN ER (GLUCOPHAGE XR) 750 mg tablet Take 1 Tab by mouth daily for 90 days. 60 Tab 2    Blood-Glucose Meter monitoring kit Check blood sugar 3 times a day 1 Kit 0    glucose blood VI test strips (BLOOD GLUCOSE TEST) strip Test as directed in clinic. Dx: Diabetes Mellitus 100 Strip 0    glucose blood VI test strips (ONETOUCH ULTRA BLUE TEST STRIP) strip Check three times a day. Fluctuating blood sugar 100 Strip 3    lancets misc Use as directed. Dx: E11.9 100 Each 1    senna-docusate (PERICOLACE) 8.6-50 mg per tablet Take 1 Tab by mouth two (2) times a day. 30 Tab 0    ascorbic acid/multivit-min (EMERGEN-C PO) Take 1,000 mg by mouth daily.  aspirin delayed-release 81 mg tablet Take  by mouth daily.  TURMERIC PO Take 500 mg by mouth daily.  potassium 99 mg tablet Take 99 mg by mouth every other day.  LUTEIN PO Take 25 mcg by mouth daily.  OTHER bromium 500 mg every morning      OTHER Berta daily (500 mg)      fish,bora,flax oils-om3,6,9no1 (OMEGA 3-6-9) 1,200 mg cap Take  by mouth.       loratadine (CLARITIN) 10 mg tablet Take 10 mg by mouth daily.  coenzyme q10 (CO Q-10) 10 mg cap Take 100 mg by mouth daily.  zinc 50 mg tab tablet Take 50 mg by mouth daily.  B.infantis-B.ani-B.long-B.bifi (PROBIOTIC 4X) 10-15 mg TbEC Take  by mouth daily.  ONETOUCH DELICA LANCETS 30 gauge misc       magnesium 250 mg Tab Take 400 mg by mouth daily.  melatonin 3 mg tablet Take 3 mg by mouth nightly. No current facility-administered medications on file prior to visit. Allergies   Allergen Reactions    Ceclor [Cefaclor] Rash     Amilcar Trinidad    Sulfa (Sulfonamide Antibiotics) Anaphylaxis, Shortness of Breath and Rash    Invokana [Canagliflozin] Other (comments)     Alopecia and yeast     Micardis [Telmisartan] Myalgia     Leg muscle pain - subsided with discontinuation    Azithromycin Nausea Only     Pt states she is allergic to all mycin drugs; GI upset    Ciprofloxacin Other (comments)     Headache    Lexapro [Escitalopram] Other (comments)     Pt states she had arm pains and vision changes.  Losartan Cough    Other Medication Other (comments)     DERMABOND GLUE, HAD BREAST REDUCTION AND WOUND ALMOST DEHISCED.     Tetracycline Other (comments)     Stomach issues      Zyrtec [Cetirizine] Other (comments)     Pt states that it makes her heart race       Past Medical History:   Diagnosis Date    Arthritis     degenerative & generalized    Chronic pain     right hip    Diabetes mellitus type 2, controlled ([facility] [address] 8/5/2015    Elevated liver enzymes     H/O bone density study 10/12    normal    H/O seasonal allergies     Hypercalcemia 5/8/2017    Hypertension     resolved last in May    Nausea & vomiting     Other and unspecified hyperlipidemia 8/5/2015    Psychiatric disorder     anxiety no meds       Past Surgical History:   Procedure Laterality Date    HX ABDOMINOPLASTY  2003    HX BREAST REDUCTION Bilateral 11/21/2017    BILATERAL BREAST REDUCTION performed by Moe Doctor MD Bharat at [address] HX  SECTION  [id number]    HX CYST REMOVAL Right     ganglion - wrist    HX GI  2011    colonoscopy-normal-10 years-done in [address]    BTL    HX HEENT Right     Lasik    HX ORTHOPAEDIC  2016    back surgery L3- L5 , screws and rods and plates    HX OTHER SURGICAL Right     plantar wart of FOOT & 3th and 5th toe ? procedure    HX TONSILLECTOMY  1967       Family History   Problem Relation Age of Onset    Hypertension Mother    Kelly Clemens Diabetes Mother     Arthritis-osteo Mother     Cancer Father         lung, ?brain    Other Paternal Aunt         ruptured brain aneurysm - HTN    Hypertension Sister         bilat THR    Arthritis-osteo Sister     Coronary Artery Disease Brother         stent    Arthritis-osteo Brother     No Known Problems Brother     Lung Disease Brother     Other Maternal Grandmother         during childbirth    Hypertension Sister     Anesth Problems Neg Hx        Social History     Socioeconomic History    Marital status:      Spouse name: Single    Number of children: 2    Years of education: BA    Highest education level: Not on file   Occupational History    Occupation: NP     Comment: [employer]   Social Needs    Financial resource strain: Not on file    Food insecurity     Worry: Not on file     Inability: Not on file   Blottr needs     Medical: Not on file     Non-medical: Not on file   Tobacco Use    Smoking status: Never Smoker    Smokeless tobacco: Never Used   Substance and Sexual Activity    Alcohol use:  Yes     Alcohol/week: 0.0 standard drinks     Comment: very rare    Drug use: No    Sexual activity: Not Currently   Lifestyle    Physical activity     Days per week: Not on file     Minutes per session: Not on file    Stress: Not on file   Relationships    Social connections     Talks on phone: Not on file     Gets together: Not on file     Attends Shinto service: Not on file     Active member of club or organization: Not on file     Attends meetings of clubs or organizations: Not on file     Relationship status: Not on file    Intimate partner violence     Fear of current or ex partner: Not on file     Emotionally abused: Not on file     Physically abused: Not on file     Forced sexual activity: Not on file   Other Topics Concern    Not on file   Social History Narrative    ** Merged History Encounter **            No visits with results within 3 Month(s) from this visit. Latest known visit with results is:   Office Visit on 10/14/2019   Component Date Value Ref Range Status    Glucose 10/14/2019 148* 65 - 99 mg/dL Final    BUN 10/14/2019 13  8 - 27 mg/dL Final    Creatinine 10/14/2019 0.72  0.57 - 1.00 mg/dL Final    GFR est non-AA 10/14/2019 91  >59 mL/min/1.73 Final    GFR est AA 10/14/2019 105  >59 mL/min/1.73 Final    BUN/Creatinine ratio 10/14/2019 18  12 - 28 Final    Sodium 10/14/2019 142  134 - 144 mmol/L Final    Potassium 10/14/2019 4.6  3.5 - 5.2 mmol/L Final    Chloride 10/14/2019 100  96 - 106 mmol/L Final    CO2 10/14/2019 22  20 - 29 mmol/L Final    Calcium 10/14/2019 9.7  8.7 - 10.3 mg/dL Final    Protein, total 10/14/2019 7.0  6.0 - 8.5 g/dL Final    Albumin 10/14/2019 4.4  3.6 - 4.8 g/dL Final    GLOBULIN, TOTAL 10/14/2019 2.6  1.5 - 4.5 g/dL Final    A-G Ratio 10/14/2019 1.7  1.2 - 2.2 Final    Bilirubin, total 10/14/2019 <0.2  0.0 - 1.2 mg/dL Final    Alk. phosphatase 10/14/2019 77  39 - 117 IU/L Final    AST (SGOT) 10/14/2019 37  0 - 40 IU/L Final    ALT (SGPT) 10/14/2019 48* 0 - 32 IU/L Final    Hemoglobin A1c 10/14/2019 6.7* 4.8 - 5.6 % Final    Comment:          Prediabetes: 5.7 - 6.4           Diabetes: >6.4           Glycemic control for adults with diabetes: <7.0      Estimated average glucose 10/14/2019 146  mg/dL Final     Review of Systems   Constitutional: Negative for malaise/fatigue.    HENT: Negative for congestion. Eyes: Negative for blurred vision. Respiratory: Negative for shortness of breath. Cardiovascular: Negative for chest pain and leg swelling. Gastrointestinal: Negative for constipation, diarrhea and heartburn. Genitourinary: Negative for dysuria, frequency and urgency. Musculoskeletal: Negative for joint pain and myalgias. Neurological: Negative for dizziness and headaches. Psychiatric/Behavioral: Negative for depression and substance abuse. The patient is not nervous/anxious and does not have insomnia. There were no vitals taken for this visit. Physical Exam  Constitutional:       General: She is not in acute distress. Appearance: She is well-developed. She is not diaphoretic. HENT:      Head: Normocephalic and atraumatic. Nose: No congestion. Eyes:      General:         Right eye: No discharge. Left eye: No discharge. Conjunctiva/sclera: Conjunctivae normal.   Pulmonary:      Effort: Pulmonary effort is normal. No respiratory distress. Neurological:      Mental Status: She is alert and oriented to person, place, and time. Psychiatric:         Behavior: Behavior normal.         Thought Content: Thought content normal.         ASSESSMENT and PLAN    ICD-10-CM ICD-9-CM    1. Benign essential hypertension I10 401.1 Stable at this time. No changes in medication needed at this time. 2. Controlled type 2 diabetes mellitus without complication, without long-term current use of insulin ([facility]) N21.8 524.45 METABOLIC PANEL, COMPREHENSIVE    Metabolic panel ordered       HEMOGLOBIN A1C WITH EAG    Hemoglobin A1C ordered   3. Obesity (BMI 30-39. 9) E66.9 278.00 Advised pt to try to eat healthy and excercise        Total Time: minutes: 11-20 minutes. This plan was reviewed with the patient and patient agrees. All questions were answered. This scribe documentation was reviewed by me and accurately reflects the examination and decisions made by me.     This note will not be viewable in [address]

## 2020-07-17 NOTE — PROGRESS NOTES
Reviewed record in preparation for visit and have obtained necessary documentation. Identified pt with two pt identifiers(name and ). Health Maintenance Due   Topic    Pneumococcal 19-64 Medium Risk (1 of 1 - PPSV23)    Shingrix Vaccine Age 50> (1 of 2)    FOOT EXAM Q1     EYE EXAM RETINAL OR DILATED Q1          Chief Complaint   Patient presents with    Pre-op Exam     R THR 10/08/18 by Dr. Twin Weiner; pt provided office with forms.         Wt Readings from Last 3 Encounters:   10/03/18 177 lb 12.8 oz (80.6 kg)   18 176 lb (79.8 kg)   18 175 lb 11.2 oz (79.7 kg)     Temp Readings from Last 3 Encounters:   10/03/18 98.2 °F (36.8 °C) (Oral)   18 98.1 °F (36.7 °C)   18 97.9 °F (36.6 °C) (Oral)     BP Readings from Last 3 Encounters:   10/03/18 128/84   18 122/81   18 132/80     Pulse Readings from Last 3 Encounters:   10/03/18 88   18 82   18 91           Learning Assessment:  :     Learning Assessment 2017   PRIMARY LEARNER Patient Patient Patient Patient Patient Patient   HIGHEST LEVEL OF EDUCATION - PRIMARY LEARNER  > 4 YEARS OF COLLEGE > 4 YEARS OF COLLEGE > 4 YEARS OF COLLEGE > 4 YEARS OF COLLEGE 2 YEARS OF COLLEGE -   BARRIERS PRIMARY LEARNER NONE NONE NONE NONE NONE -   CO-LEARNER CAREGIVER No No No No No -   PRIMARY LANGUAGE ENGLISH ENGLISH ENGLISH ENGLISH ENGLISH ENGLISH    NEED - - No No No -   LEARNER PREFERENCE PRIMARY DEMONSTRATION DEMONSTRATION DEMONSTRATION PICTURES DEMONSTRATION OTHER (COMMENT)     - - READING READING - -     - - - VIDEOS - -   LEARNING SPECIAL TOPICS - - no no no -   ANSWERED BY patient patient patient patient patient patient   RELATIONSHIP SELF SELF SELF SELF SELF SELF   ASSESSMENT COMMENT - - none none - -       Depression Screening:  :     PHQ over the last two weeks 2018   Little interest or pleasure in doing things Not at all   Feeling down, depressed, irritable, or hopeless Not at all   Total Score PHQ 2 0       Fall Risk Assessment:  :     Fall Risk Assessment, last 12 mths 11/29/2017   Able to walk? Yes   Fall in past 12 months? No       Abuse Screening:  :     Abuse Screening Questionnaire 6/27/2018 11/29/2017 8/4/2015 7/14/2014   Do you ever feel afraid of your partner? N N N N   Are you in a relationship with someone who physically or mentally threatens you? N N N N   Is it safe for you to go home? Y Y Y Y       Coordination of Care Questionnaire:  :     1) Have you been to an emergency room, urgent care clinic since your last visit? no   Hospitalized since your last visit? no             2) Have you seen or consulted any other health care providers outside of [address] [place] [address] since your last visit? yes, Ortho  (Include any pap smears or colon screenings in this section.)    3) Do you have an Advance Directive on file?  no No

## 2020-11-02 ENCOUNTER — TELEPHONE (OUTPATIENT)
Dept: PRIMARY CARE CLINIC | Age: 60
End: 2020-11-02

## 2020-11-02 DIAGNOSIS — E03.9 ACQUIRED HYPOTHYROIDISM: ICD-10-CM

## 2020-11-02 DIAGNOSIS — E78.2 MIXED HYPERLIPIDEMIA: Primary | ICD-10-CM

## 2020-11-02 DIAGNOSIS — Z00.00 PHYSICAL EXAM: ICD-10-CM

## 2020-11-02 DIAGNOSIS — E11.9 CONTROLLED TYPE 2 DIABETES MELLITUS WITHOUT COMPLICATION, WITHOUT LONG-TERM CURRENT USE OF INSULIN (HCC): ICD-10-CM

## 2020-11-02 NOTE — TELEPHONE ENCOUNTER
Pt would like lab orders put in  for A1C, CMP, Lipid pannel .  States she has talked with Dr. Amira Jackson regarding her diabetes

## 2020-11-04 DIAGNOSIS — E11.9 CONTROLLED TYPE 2 DIABETES MELLITUS WITHOUT COMPLICATION, UNSPECIFIED WHETHER LONG TERM INSULIN USE (HCC): ICD-10-CM

## 2020-11-04 RX ORDER — METFORMIN HYDROCHLORIDE 750 MG/1
TABLET, EXTENDED RELEASE ORAL
Qty: 60 TAB | Refills: 2 | Status: SHIPPED | OUTPATIENT
Start: 2020-11-04 | End: 2021-05-08

## 2020-11-05 DIAGNOSIS — Z00.00 PHYSICAL EXAM: ICD-10-CM

## 2020-11-05 DIAGNOSIS — E11.9 CONTROLLED TYPE 2 DIABETES MELLITUS WITHOUT COMPLICATION, WITHOUT LONG-TERM CURRENT USE OF INSULIN (HCC): ICD-10-CM

## 2020-11-05 DIAGNOSIS — E78.2 MIXED HYPERLIPIDEMIA: ICD-10-CM

## 2020-11-05 DIAGNOSIS — E03.9 ACQUIRED HYPOTHYROIDISM: ICD-10-CM

## 2020-11-05 LAB
ALBUMIN SERPL-MCNC: 4.2 G/DL (ref 3.5–5)
ALBUMIN/GLOB SERPL: 1.4 {RATIO} (ref 1.1–2.2)
ALP SERPL-CCNC: 82 U/L (ref 45–117)
ALT SERPL-CCNC: 54 U/L (ref 12–78)
ANION GAP SERPL CALC-SCNC: 7 MMOL/L (ref 5–15)
AST SERPL-CCNC: 36 U/L (ref 15–37)
BASOPHILS # BLD: 0 K/UL (ref 0–0.1)
BASOPHILS NFR BLD: 1 % (ref 0–1)
BILIRUB SERPL-MCNC: 0.3 MG/DL (ref 0.2–1)
BUN SERPL-MCNC: 19 MG/DL (ref 6–20)
BUN/CREAT SERPL: 25 (ref 12–20)
CALCIUM SERPL-MCNC: 9.5 MG/DL (ref 8.5–10.1)
CHLORIDE SERPL-SCNC: 103 MMOL/L (ref 97–108)
CHOLEST SERPL-MCNC: 231 MG/DL
CO2 SERPL-SCNC: 30 MMOL/L (ref 21–32)
CREAT SERPL-MCNC: 0.76 MG/DL (ref 0.55–1.02)
DIFFERENTIAL METHOD BLD: ABNORMAL
EOSINOPHIL # BLD: 0.3 K/UL (ref 0–0.4)
EOSINOPHIL NFR BLD: 7 % (ref 0–7)
ERYTHROCYTE [DISTWIDTH] IN BLOOD BY AUTOMATED COUNT: 15 % (ref 11.5–14.5)
EST. AVERAGE GLUCOSE BLD GHB EST-MCNC: 148 MG/DL
GLOBULIN SER CALC-MCNC: 2.9 G/DL (ref 2–4)
GLUCOSE SERPL-MCNC: 115 MG/DL (ref 65–100)
HBA1C MFR BLD: 6.8 % (ref 4–5.6)
HCT VFR BLD AUTO: 42.4 % (ref 35–47)
HDLC SERPL-MCNC: 55 MG/DL
HDLC SERPL: 4.2 {RATIO} (ref 0–5)
HGB BLD-MCNC: 12.9 G/DL (ref 11.5–16)
IMM GRANULOCYTES # BLD AUTO: 0 K/UL (ref 0–0.04)
IMM GRANULOCYTES NFR BLD AUTO: 0 % (ref 0–0.5)
LDLC SERPL CALC-MCNC: 156.8 MG/DL (ref 0–100)
LIPID PROFILE,FLP: ABNORMAL
LYMPHOCYTES # BLD: 1.6 K/UL (ref 0.8–3.5)
LYMPHOCYTES NFR BLD: 34 % (ref 12–49)
MCH RBC QN AUTO: 25.3 PG (ref 26–34)
MCHC RBC AUTO-ENTMCNC: 30.4 G/DL (ref 30–36.5)
MCV RBC AUTO: 83.1 FL (ref 80–99)
MONOCYTES # BLD: 0.6 K/UL (ref 0–1)
MONOCYTES NFR BLD: 12 % (ref 5–13)
NEUTS SEG # BLD: 2.3 K/UL (ref 1.8–8)
NEUTS SEG NFR BLD: 46 % (ref 32–75)
NRBC # BLD: 0 K/UL (ref 0–0.01)
NRBC BLD-RTO: 0 PER 100 WBC
PLATELET # BLD AUTO: 267 K/UL (ref 150–400)
PMV BLD AUTO: 11.3 FL (ref 8.9–12.9)
POTASSIUM SERPL-SCNC: 4.1 MMOL/L (ref 3.5–5.1)
PROT SERPL-MCNC: 7.1 G/DL (ref 6.4–8.2)
RBC # BLD AUTO: 5.1 M/UL (ref 3.8–5.2)
SODIUM SERPL-SCNC: 140 MMOL/L (ref 136–145)
TRIGL SERPL-MCNC: 96 MG/DL (ref ?–150)
TSH SERPL DL<=0.05 MIU/L-ACNC: 1.93 UIU/ML (ref 0.36–3.74)
VLDLC SERPL CALC-MCNC: 19.2 MG/DL
WBC # BLD AUTO: 4.9 K/UL (ref 3.6–11)

## 2020-11-06 ENCOUNTER — OFFICE VISIT (OUTPATIENT)
Dept: PRIMARY CARE CLINIC | Age: 60
End: 2020-11-06

## 2020-11-06 VITALS
TEMPERATURE: 98.4 F | WEIGHT: 180.6 LBS | HEIGHT: 63 IN | BODY MASS INDEX: 32 KG/M2 | HEART RATE: 84 BPM | DIASTOLIC BLOOD PRESSURE: 83 MMHG | OXYGEN SATURATION: 99 % | RESPIRATION RATE: 16 BRPM | SYSTOLIC BLOOD PRESSURE: 131 MMHG

## 2020-11-06 DIAGNOSIS — I10 ESSENTIAL HYPERTENSION: ICD-10-CM

## 2020-11-06 DIAGNOSIS — E78.2 MIXED HYPERLIPIDEMIA: ICD-10-CM

## 2020-11-06 DIAGNOSIS — E66.9 OBESITY (BMI 30-39.9): ICD-10-CM

## 2020-11-06 DIAGNOSIS — E11.9 CONTROLLED TYPE 2 DIABETES MELLITUS WITHOUT COMPLICATION, UNSPECIFIED WHETHER LONG TERM INSULIN USE (HCC): Primary | ICD-10-CM

## 2020-11-06 PROCEDURE — 99214 OFFICE O/P EST MOD 30 MIN: CPT | Performed by: INTERNAL MEDICINE

## 2020-11-06 NOTE — PROGRESS NOTES
Chief Complaint   Patient presents with    Follow Up Chronic Condition     patient is self pay and does not have insurance,  diabetes and bp check and only taking 12.5 hydrochlorothiazide where she was taking 25.

## 2020-11-06 NOTE — PROGRESS NOTES
Written by Chino Newby, as dictated by Dr. Ashish Pinzon MD.    Fei Neri is a 56 y.o. female. HPI  Pt presents today to follow up on diabetes and discuss lab results. Her labs drawn on 11/05/20 showed an HbA1c of 6.8. Her BP looks good in office today at 131/83, and she is taking a half a tablet of HCTZ 25 mg every day. She takes turmeric, magnesium, and zinc every day. She takes melatonin for sleep. Her lipid panel showed an elevated total cholesterol (231) and LDL (156.8). This is an increase from 07/20/19, when her LDL was 133 and her total cholesterol was 199. However, her HDL has shown a bit of improvement from 51 to 55. Her CT heart done on 04/03/19 gave her a total calcium score of 7. She has been eating a lot of nuts, and these are probably raising her HDL and helping with her diabetes, but she is increasing her LDL and gaining weight by doing this as well. She tried doing a ketogenic diet before for weight loss, but it raised her cholesterol. She is doing more of a Mediterranean-style diet now to start working on her cholesterol. She has gained weight from 176 lb on 10/14/19 to 180 lb today. 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 Patient Active Problem List   Diagnosis Code    Menopausal state N95.1    DDD (degenerative disc disease), thoracic M51.34    DDD (degenerative disc disease), lumbar M51.36    Pulmonary nodule, left R91.1    Diabetes mellitus type 2, controlled ([facility] [address] E11.9    Benign essential hypertension I10    Hyperlipidemia with target LDL less than 100 E78.5    Obesity (BMI 30-39. 9) E66.9    Anxiety disorder F41.9    Status post lumbar surgery Z98.890    Hypercalcemia E83.52    Hx of bilateral breast reduction surgery Z98.890    Osteoarthritis of right hip M16.11        Current Outpatient Medications on File Prior to Visit   Medication Sig Dispense Refill    metFORMIN ER (GLUCOPHAGE XR) 750 mg tablet TAKE 1 TABLET BY MOUTH EVERY DAY 60 Tab 2    hydroCHLOROthiazide (HYDRODIURIL) 25 mg tablet TAKE ONE TABLET BY MOUTH DAILY 90 Tab 3    Blood-Glucose Meter monitoring kit Check blood sugar 3 times a day 1 Kit 0    glucose blood VI test strips (BLOOD GLUCOSE TEST) strip Test as directed in clinic. Dx: Diabetes Mellitus 100 Strip 0    glucose blood VI test strips (ONETOUCH ULTRA BLUE TEST STRIP) strip Check three times a day. Fluctuating blood sugar 100 Strip 3    lancets misc Use as directed. Dx: E11.9 100 Each 1    ascorbic acid/multivit-min (EMERGEN-C PO) Take 1,000 mg by mouth daily.  aspirin delayed-release 81 mg tablet Take  by mouth daily.  TURMERIC PO Take 500 mg by mouth daily.  potassium 99 mg tablet Take 99 mg by mouth every other day.  LUTEIN PO Take 25 mcg by mouth daily.  OTHER bromium 500 mg every morning      OTHER Ebrta daily (500 mg)      fish,bora,flax oils-om3,6,9no1 (OMEGA 3-6-9) 1,200 mg cap Take  by mouth.  loratadine (CLARITIN) 10 mg tablet Take 10 mg by mouth daily.  coenzyme q10 (CO Q-10) 10 mg cap Take 100 mg by mouth daily.  zinc 50 mg tab tablet Take 50 mg by mouth daily.  B.infantis-B.ani-B.long-B.bifi (PROBIOTIC 4X) 10-15 mg TbEC Take  by mouth daily.  ONETOUCH DELICA LANCETS 30 gauge misc       magnesium 250 mg Tab Take 400 mg by mouth daily.  melatonin 3 mg tablet Take 3 mg by mouth nightly.  senna-docusate (PERICOLACE) 8.6-50 mg per tablet Take 1 Tab by mouth two (2) times a day. 30 Tab 0     No current facility-administered medications on file prior to visit.         Allergies   Allergen Reactions    Ceclor [Cefaclor] Rash     Robert's Amos    Sulfa (Sulfonamide Antibiotics) Anaphylaxis, Shortness of Breath and Rash    Invokana [Canagliflozin] Other (comments)     Alopecia and yeast     Micardis [Telmisartan] Myalgia     Leg muscle pain - subsided with discontinuation    Azithromycin Nausea Only     Pt states she is allergic to all mycin drugs; GI upset    Ciprofloxacin Other (comments)     Headache    Lexapro [Escitalopram] Other (comments)     Pt states she had arm pains and vision changes.  Losartan Cough    Other Medication Other (comments)     DERMABOND GLUE, HAD BREAST REDUCTION AND WOUND ALMOST DEHISCED.     Tetracycline Other (comments)     Stomach issues      Zyrtec [Cetirizine] Other (comments)     Pt states that it makes her heart race       Past Medical History:   Diagnosis Date    Arthritis     degenerative & generalized    Chronic pain     right hip    Diabetes mellitus type 2, controlled ([facility] [address] 8/5/2015    Elevated liver enzymes     H/O bone density study 10/12    normal    H/O seasonal allergies     Hypercalcemia 5/8/2017    Hypertension     resolved last in May    Nausea & vomiting     Other and unspecified hyperlipidemia 8/5/2015    Psychiatric disorder     anxiety no meds       Past Surgical History:   Procedure Laterality Date    HX ABDOMINOPLASTY  2003    HX BREAST REDUCTION Bilateral 11/21/2017    BILATERAL BREAST REDUCTION performed by Parisa Jones MD [facility]  [phone]    HX CYST REMOVAL Right 2008    ganglion - wrist    HX GI  11/2011    colonoscopy-normal-10 years-done in [address]    BTL    HX HEENT Right 2007    Lasik    HX ORTHOPAEDIC  06/21/2016    back surgery L3- L5 , screws and rods and plates    HX OTHER SURGICAL Right 1991    plantar wart of FOOT & 3th and 5th toe ? procedure    HX TONSILLECTOMY  1967       Family History   Problem Relation Age of Onset    Hypertension Mother     Diabetes Mother     Arthritis-osteo Mother     Cancer Father         lung, ?brain    Other Paternal Aunt         ruptured brain aneurysm - HTN    Hypertension Sister         bilat THR    Arthritis-osteo Sister     Coronary Artery Disease Brother         stent    Arthritis-osteo Brother     No Known Problems Brother     Lung Disease Brother     Other Maternal Grandmother         during childbirth    Hypertension Sister     Anesth Problems Neg Hx        Social History     Socioeconomic History    Marital status:      Spouse name: Single    Number of children: 2    Years of education: BA    Highest education level: Not on file   Occupational History    Occupation: NP     Comment: [id number]   Social Needs    Financial resource strain: Not on file    Food insecurity     Worry: Not on file     Inability: Not on file   [employer] needs     Medical: Not on file     Non-medical: Not on file   Tobacco Use    Smoking status: Never Smoker    Smokeless tobacco: Never Used   Substance and Sexual Activity    Alcohol use:  Yes     Alcohol/week: 0.0 standard drinks     Comment: very rare    Drug use: No    Sexual activity: Not Currently   Lifestyle    Physical activity     Days per week: Not on file     Minutes per session: Not on file    Stress: Not on file   Relationships    Social connections     Talks on phone: Not on file     Gets together: Not on file     Attends Amish service: Not on file     Active member of club or organization: Not on file     Attends meetings of clubs or organizations: Not on file     Relationship status: Not on file    Intimate partner violence     Fear of current or ex partner: Not on file     Emotionally abused: Not on file     Physically abused: Not on file     Forced sexual activity: Not on file   Other Topics Concern    Not on file   Social History Narrative    ** Merged History Encounter **            Orders Only on 11/05/2020   Component Date Value Ref Range Status    LIPID PROFILE 11/05/2020        Final    Cholesterol, total 11/05/2020 231* <200 MG/DL Final    Triglyceride 11/05/2020 96  <150 MG/DL Final    Comment: Based on NCEP-ATP III:  Triglycerides <150 mg/dL  is considered normal, 150-199  mg/dL  borderline high,  200-499 mg/dL high and  greater than or equal to 500  mg/dL very high.  HDL Cholesterol 11/05/2020 55  MG/DL Final    Comment: Based on NCEP ATP III, HDL Cholesterol <40 mg/dL is considered low and >60  mg/dL is elevated.  LDL, calculated 11/05/2020 156.8* 0 - 100 MG/DL Final    Comment: Based on the NCEP-ATP: LDL-C concentrations are considered  optimal <100 mg/dL,  near optimal/above Normal 100-129 mg/dL Borderline High: 130-159, High: 160-189  mg/dL Very High: Greater than or equal to 190 mg/dL      VLDL, calculated 11/05/2020 19.2  MG/DL Final    CHOL/HDL Ratio 11/05/2020 4.2  0.0 - 5.0   Final    TSH 11/05/2020 1.93  0.36 - 3.74 uIU/mL Final    Comment:   Due to TSH heterogeneity, both structurally and degree of glycosylation,  monoclonal antibodies used in the TSH assay may not accurately quantitate TSH. Therefore, this result should be correlated with clinical findings as well as  with other assessments of thyroid function, e.g., free T4, free T3.  Sodium 11/05/2020 140  136 - 145 mmol/L Final    Potassium 11/05/2020 4.1  3.5 - 5.1 mmol/L Final    Chloride 11/05/2020 103  97 - 108 mmol/L Final    CO2 11/05/2020 30  21 - 32 mmol/L Final    Anion gap 11/05/2020 7  5 - 15 mmol/L Final    Glucose 11/05/2020 115* 65 - 100 mg/dL Final    BUN 11/05/2020 19  6 - 20 MG/DL Final    Creatinine 11/05/2020 0.76  0.55 - 1.02 MG/DL Final    BUN/Creatinine ratio 11/05/2020 25* 12 - 20   Final    GFR est AA 11/05/2020 >60  >60 ml/min/1.73m2 Final    GFR est non-AA 11/05/2020 >60  >60 ml/min/1.73m2 Final    Comment: Estimated GFR is calculated using the IDMS-traceable Modification of Diet in  Renal Disease (MDRD) Study equation, reported for both  Americans  (GFRAA) and non- Americans (GFRNA), and normalized to 1.73m2 body  surface area.  The physician must decide which value applies to the patient.  Calcium 11/05/2020 9.5  8.5 - 10.1 MG/DL Final    Bilirubin, total 11/05/2020 0.3  0.2 - 1.0 MG/DL Final    ALT (SGPT) 11/05/2020 54  12 - 78 U/L Final    AST (SGOT) 11/05/2020 36  15 - 37 U/L Final    Alk. phosphatase 11/05/2020 82  45 - 117 U/L Final    Protein, total 11/05/2020 7.1  6.4 - 8.2 g/dL Final    Albumin 11/05/2020 4.2  3.5 - 5.0 g/dL Final    Globulin 11/05/2020 2.9  2.0 - 4.0 g/dL Final    A-G Ratio 11/05/2020 1.4  1.1 - 2.2   Final    WBC 11/05/2020 4.9  3.6 - 11.0 K/uL Final    RBC 11/05/2020 5.10  3.80 - 5.20 M/uL Final    HGB 11/05/2020 12.9  11.5 - 16.0 g/dL Final    HCT 11/05/2020 42.4  35.0 - 47.0 % Final    MCV 11/05/2020 83.1  80.0 - 99.0 FL Final    MCH 11/05/2020 25.3* 26.0 - 34.0 PG Final    MCHC 11/05/2020 30.4  30.0 - 36.5 g/dL Final    RDW 11/05/2020 15.0* 11.5 - 14.5 % Final    PLATELET 16/07/3329 150  150 - 400 K/uL Final    MPV 11/05/2020 11.3  8.9 - 12.9 FL Final    NRBC 11/05/2020 0.0  0  WBC Final    ABSOLUTE NRBC 11/05/2020 0.00  0.00 - 0.01 K/uL Final    NEUTROPHILS 11/05/2020 46  32 - 75 % Final    LYMPHOCYTES 11/05/2020 34  12 - 49 % Final    MONOCYTES 11/05/2020 12  5 - 13 % Final    EOSINOPHILS 11/05/2020 7  0 - 7 % Final    BASOPHILS 11/05/2020 1  0 - 1 % Final    IMMATURE GRANULOCYTES 11/05/2020 0  0.0 - 0.5 % Final    ABS. NEUTROPHILS 11/05/2020 2.3  1.8 - 8.0 K/UL Final    ABS. LYMPHOCYTES 11/05/2020 1.6  0.8 - 3.5 K/UL Final    ABS. MONOCYTES 11/05/2020 0.6  0.0 - 1.0 K/UL Final    ABS. EOSINOPHILS 11/05/2020 0.3  0.0 - 0.4 K/UL Final    ABS. BASOPHILS 11/05/2020 0.0  0.0 - 0.1 K/UL Final    ABS. IMM. GRANS.  11/05/2020 0.0  0.00 - 0.04 K/UL Final    DF 11/05/2020 AUTOMATED    Final    Hemoglobin A1c 11/05/2020 6.8* 4.0 - 5.6 % Final    Comment: NEW METHOD PLEASE NOTE NEW REFERENCE RANGE  (NOTE)  HbA1C Interpretive Ranges  <5.7              Normal  5.7 - 6.4 Consider Prediabetes  >6.5              Consider Diabetes      Est. average glucose 11/05/2020 148  mg/dL Final     Review of Systems   Constitutional: Negative for malaise/fatigue and weight loss. HENT: Negative for congestion and sore throat. Eyes: Negative for blurred vision. Respiratory: Negative for cough and shortness of breath. Cardiovascular: Negative for chest pain and leg swelling. Gastrointestinal: Negative for constipation and heartburn. Genitourinary: Negative for frequency and urgency. Musculoskeletal: Negative for back pain, joint pain and myalgias. Neurological: Negative for dizziness and headaches. Psychiatric/Behavioral: Negative for depression. The patient is not nervous/anxious and does not have insomnia. Visit Vitals  /83 (BP 1 Location: Left arm, BP Patient Position: Sitting)   Pulse 84   Temp 98.4 °F (36.9 °C) (Oral)   Resp 16   Ht 5' 3\" (1.6 m)   Wt 180 lb 9.6 oz (81.9 kg)   SpO2 99%   BMI 31.99 kg/m²     Physical Exam  Vitals signs and nursing note reviewed. Constitutional:       General: She is not in acute distress. Appearance: Normal appearance. She is well-developed and well-groomed. She is not diaphoretic. HENT:      Right Ear: External ear normal.      Left Ear: External ear normal.   Eyes:      General: No scleral icterus. Right eye: No discharge. Left eye: No discharge. Extraocular Movements: Extraocular movements intact. Neck:      Musculoskeletal: Normal range of motion and neck supple. Cardiovascular:      Rate and Rhythm: Normal rate and regular rhythm. Pulmonary:      Effort: Pulmonary effort is normal.      Breath sounds: Normal breath sounds. No wheezing. Musculoskeletal:      Right lower leg: No edema. Left lower leg: No edema.    Feet:      Comments: Diabetic foot exam:   Left: Vibratory sensation normal    Sharp/dull discrimination normal    Filament test normal sensation with micro filament   Pulse DP: 2+ (normal)   Deformities: None  Right: Vibratory sensation normal   Sharp/dull discrimination normal   Filament test normal sensation with micro filament   Pulse DP: 2+ (normal)   Deformities: None  Lymphadenopathy:      Cervical: No cervical adenopathy. Neurological:      Mental Status: She is alert and oriented to person, place, and time. Psychiatric:         Mood and Affect: Mood and affect normal.         Behavior: Behavior normal.       ASSESSMENT and PLAN    ICD-10-CM ICD-9-CM    1. Controlled type 2 diabetes mellitus without complication, unspecified whether long term insulin use (HCC)  E11.9 250.00  DIABETES FOOT EXAM    Diabetic foot exam normal.     2. Mixed hyperlipidemia  E78.2 272.2 I am not going to start her on a medication yet, but I instructed her to increase her exercise and continue with her plans to follow a Mediterranean diet to improve her cholesterol. Explained that while she had a low calcium score of 7 on the CT heart, it is still important that we get her cholesterol under control. 3. Essential hypertension  I10 401.9 BP is well-controlled on current medication. No change to dosage at this time. 4. Obesity (BMI 30-39. 9)  E66.9 278.00 I commended the pt on their healthy lifestyle choices and routines. Explained that they should be walking 5,000 steps daily to maintain conditioning and weight and increase to at least 10,000 steps to work on losing weight. This plan was reviewed with the patient and patient agrees. All questions were answered. This scribe documentation was reviewed by me and accurately reflects the examination and decisions made by me.

## 2021-05-08 DIAGNOSIS — E11.9 CONTROLLED TYPE 2 DIABETES MELLITUS WITHOUT COMPLICATION, UNSPECIFIED WHETHER LONG TERM INSULIN USE (HCC): ICD-10-CM

## 2021-05-08 RX ORDER — METFORMIN HYDROCHLORIDE 750 MG/1
TABLET, EXTENDED RELEASE ORAL
Qty: 90 TAB | Refills: 1 | Status: SHIPPED | OUTPATIENT
Start: 2021-05-08 | End: 2021-11-13

## 2021-06-05 NOTE — PROGRESS NOTES
HPI:  Elisabeth Valenzuela is a 60y.o. year old female who returns to clinic today for routine follow up appointment to discuss the issues below:    Here for diabetic follow up. Last seen 9/2016 by Dr. Lenore Clayton.   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 At that visit - Metformin inc from 500 mg bid to 1000 mg bid due to a rise in A1c from 6.6 % --> 7.1 %. She notes stomach, flank pain and \"sick\" on the higher dose. In Feb she reduced it on her own to 1000 mg in the morning and 500 mg in the evening. She has continued to refuse a statin for LDL. Her Calcium level was 11 in Sept.  Previously saw Dr. Gary Mac for this. She recalls no issue with her parathyroids. She takes a calcium supplement. She has elevated LFTs - she has not had alcohol since 2014 (was never a big drinker). Hasn't been exercising with the new job. Previously had been going to the gym 2-3 days per week. Diet:  Breakfast - protein shake and boiled egg  Lunch- varies:  Soup, salmon salad in tod pocket, popcorn, edamame  Afternoon snack - cup of homemade soup  Dinner - home cooked balanced meal  Drinks - mostly water         Prior to Admission medications    Medication Sig Start Date End Date Taking? Authorizing Provider   amoxicillin-clavulanate (AUGMENTIN) 875-125 mg per tablet Take 1 Tab by mouth two (2) times a day for 7 days. 2/27/17 3/6/17 Yes Omelia Simpers MELCHOR Adams   albuterol (PROVENTIL HFA, VENTOLIN HFA, PROAIR HFA) 90 mcg/actuation inhaler Take 2 Puffs by inhalation every six (6) hours as needed for Wheezing. 2/27/17  Yes Janeelia Luz Maria Adams NP   hydroCHLOROthiazide (HYDRODIURIL) 25 mg tablet TAKE ONE TABLET BY MOUTH ONE TIME DAILY 1/5/17  Yes Isabel Francisco MD   metFORMIN (GLUCOPHAGE) 1,000 mg tablet Take 1 Tab by mouth two (2) times daily (with meals).  10/17/16  Yes Mary Lou Nicholson NP   gabapentin (NEURONTIN) 300 mg capsule one tab qhs 9/3/16  Yes Historical Provider B.infantis-B.ani-B.long-B.bifi (PROBIOTIC 4X) 10-15 mg TbEC Take  by mouth daily. Yes Historical Provider   [facility] LANCETS 30 gauge misc  2/6/16  Yes Historical Provider   Calcium-Cholecalciferol, D3, 600 mg(1,500mg) -400 unit cap Take  by mouth. Yes Historical Provider   glucose blood VI test strips (ASCENSIA AUTODISC VI, ONE TOUCH ULTRA TEST VI) strip Use to check blood sugar once daily. 2/5/16  Yes Samuel Muñoz MD   Blood-Glucose Meter monitoring kit Use as directed. Dx: E11.9 2/5/16  Yes Samuel Muñoz MD   Lancets misc Use as directed. Dx: E11.9 2/5/16  Yes Samuel Muñoz MD   mometasone (NASONEX) 50 mcg/actuation nasal spray 2 Sprays by Both Nostrils route daily. 1/28/14  Yes ALISON Murillo   magnesium 250 mg Tab Take  by mouth. Yes Historical Provider   omega-3 fatty acids-vitamin e (FISH OIL) 1,000 mg Cap Take 1 Cap by mouth. Yes Historical Provider   turmeric root extract 500 mg Cap Take  by mouth. Yes Historical Provider   melatonin 3 mg tablet Take  by mouth. Yes Historical Provider          Allergies   Allergen Reactions    Ceclor [Cefaclor] Rash     Robert's Amos    Sulfur Anaphylaxis    Azithromycin Other (comments)     Stomach issues    Azithromycin Nausea Only     Pt states she is allergic to all mycin drugs.  Ceclor [Cefaclor] Rash    Ciprofloxacin Other (comments)     Headache    Ciprofloxacin (Bulk) Other (comments)     headache    Lexapro [Escitalopram] Other (comments)     Pt states she had arm pains and vision changes.  Losartan Cough    Sulfa (Sulfonamide Antibiotics) Shortness of Breath and Rash    Tetracycline Other (comments)     Stomach issues      Tetracycline Nausea Only    Zyrtec [Cetirizine] Other (comments)     Pt states that it makes her heart race           Review of Systems   Constitutional: Negative for malaise/fatigue and weight loss. Eyes: Negative for blurred vision.    Respiratory: Negative for shortness of breath. Cardiovascular: Negative for chest pain and palpitations. Gastrointestinal: Negative for abdominal pain, constipation and diarrhea. Genitourinary: Negative for frequency. Neurological: Negative for tingling. Endo/Heme/Allergies: Negative for polydipsia. Physical Exam   Constitutional: She appears well-nourished. Obese   Neck: Carotid bruit is not present. Cardiovascular: Normal rate, regular rhythm and normal heart sounds. No murmur heard. Pulses:       Carotid pulses are 2+ on the right side, and 2+ on the left side. Pulmonary/Chest: Effort normal and breath sounds normal.   Abdominal: Soft. Bowel sounds are normal. There is no hepatosplenomegaly. There is no tenderness. Musculoskeletal: She exhibits no edema. Neurological:   Monofilament intact bilaterally. Pulses R: 2+ and L: 2+. No open wounds. No skin lesions. Visit Vitals    /78 (BP 1 Location: Left arm, BP Patient Position: Sitting)    Pulse 82    Temp 97.3 °F (36.3 °C) (Oral)    Resp 16    Ht 5' 3\" (1.6 m)    Wt 186 lb 9.6 oz (84.6 kg)    SpO2 97%    BMI 33.05 kg/m2         Assessment & Plan:  Herminio Hansen was seen today for diabetes. Diagnoses and all orders for this visit:    Controlled type 2 diabetes mellitus without complication, without long-term current use of insulin ([address]  I evaluated and recommended to continue current doses of medications pending lab work. We discussed additional options as she hasn't tolerated a full 2000 mg daily of Metformin, of the options we discussed she is interested in SGLT2 inhibitors. -     METABOLIC PANEL, COMPREHENSIVE  -     LIPID PANEL  -     HEMOGLOBIN A1C WITH EAG  -      DIABETES FOOT EXAM    Benign essential hypertension  I evaluated and recommended to continue current doses of medications pending lab work. -     METABOLIC PANEL, COMPREHENSIVE    Elevated LFTs  Suspect fatty liver disease. Encouraged weight reduction and low carbohydrate diet. Recheck, consider US pending results. -     METABOLIC PANEL, COMPREHENSIVE    Hypercalcemia  I recommend she stop her calcium supplementation, encouraged dietary intake of recommended amount of calcium.    -     METABOLIC PANEL, COMPREHENSIVE  -     PTH INTACT  -     VITAMIN D, 25 HYDROXY    Need for hepatitis C screening test  -     HEPATITIS C AB    Breast cancer screening  -     JOHN MAMMO BI SCREENING INCL CAD; Future         Follow-up Disposition:  Return in about 3 months (around 6/3/2017) for Establish care with new provider for diabets. Advised her to call back or return to office if symptoms worsen/change/persist.  Discussed expected course/resolution/complications of diagnosis in detail with patient. Medication risks/benefits/costs/interactions/alternatives discussed with patient. She was given an after visit summary which includes diagnoses, current medications, & vitals. She expressed understanding with the diagnosis and plan. Yes

## 2021-06-10 LAB — HBA1C MFR BLD HPLC: 6.3 %

## 2021-09-20 DIAGNOSIS — I10 BENIGN ESSENTIAL HYPERTENSION: ICD-10-CM

## 2021-09-20 DIAGNOSIS — E11.9 CONTROLLED TYPE 2 DIABETES MELLITUS WITHOUT COMPLICATION, UNSPECIFIED WHETHER LONG TERM INSULIN USE (HCC): ICD-10-CM

## 2021-09-21 RX ORDER — HYDROCHLOROTHIAZIDE 25 MG/1
TABLET ORAL
Qty: 90 TABLET | Refills: 3 | OUTPATIENT
Start: 2021-09-21

## 2021-09-21 RX ORDER — METFORMIN HYDROCHLORIDE 750 MG/1
750 TABLET, EXTENDED RELEASE ORAL DAILY
Qty: 90 TABLET | Refills: 1 | OUTPATIENT
Start: 2021-09-21

## 2021-09-28 ENCOUNTER — OFFICE VISIT (OUTPATIENT)
Dept: PRIMARY CARE CLINIC | Age: 61
End: 2021-09-28
Payer: COMMERCIAL

## 2021-09-28 VITALS
SYSTOLIC BLOOD PRESSURE: 137 MMHG | WEIGHT: 170.6 LBS | OXYGEN SATURATION: 100 % | DIASTOLIC BLOOD PRESSURE: 85 MMHG | TEMPERATURE: 97.1 F | HEART RATE: 71 BPM | HEIGHT: 63 IN | RESPIRATION RATE: 15 BRPM | BODY MASS INDEX: 30.23 KG/M2

## 2021-09-28 DIAGNOSIS — E66.9 OBESITY (BMI 30.0-34.9): ICD-10-CM

## 2021-09-28 DIAGNOSIS — M19.90 ARTHRITIS: ICD-10-CM

## 2021-09-28 DIAGNOSIS — Z12.31 ENCOUNTER FOR SCREENING MAMMOGRAM FOR MALIGNANT NEOPLASM OF BREAST: ICD-10-CM

## 2021-09-28 DIAGNOSIS — I10 ESSENTIAL HYPERTENSION: ICD-10-CM

## 2021-09-28 DIAGNOSIS — E11.9 CONTROLLED TYPE 2 DIABETES MELLITUS WITHOUT COMPLICATION, WITHOUT LONG-TERM CURRENT USE OF INSULIN (HCC): Primary | ICD-10-CM

## 2021-09-28 PROBLEM — Z96.641 S/P HIP REPLACEMENT, RIGHT: Status: ACTIVE | Noted: 2021-09-28

## 2021-09-28 PROBLEM — M16.11 OSTEOARTHRITIS OF RIGHT HIP: Status: RESOLVED | Noted: 2018-10-08 | Resolved: 2021-09-28

## 2021-09-28 PROCEDURE — 99214 OFFICE O/P EST MOD 30 MIN: CPT | Performed by: INTERNAL MEDICINE

## 2021-09-28 NOTE — PROGRESS NOTES
Sandra Bernstein (: 1960) is a 59 y.o. female, established patient, here for evaluation of the following chief complaint(s):  Medication Refill     Written by Esme Duncan, as dictated by Dr. Dipika Borjas MD.      ASSESSMENT/PLAN:  Below is the assessment and plan developed based on review of pertinent history, physical exam, labs, studies, and medications. 1. Controlled type 2 diabetes mellitus without complication, without long-term current use of insulin (HCC)  Continue taking metformin 750 mg as prescribed. Ordered labs to check CBC levels, lipid panel, A1c level, TSH level, and kidney function. Waiting for results. Completed a diabetic foot exam in the office today. -     CBC W/O DIFF; Future  -     LIPID PANEL; Future  -     HEMOGLOBIN A1C WITH EAG; Future  -     TSH 3RD GENERATION; Future  -     MICROALBUMIN, UR, RAND W/ MICROALB/CREAT RATIO; Future  -      DIABETES FOOT EXAM    2. Essential hypertension  Well controlled on current medication. Continue taking HCTZ 25 mg as prescribed. Ordered labs to check metabolic panel. Waiting for results. -     METABOLIC PANEL, COMPREHENSIVE; Future    3. Encounter for screening mammogram for malignant neoplasm of breast  Ordered a mammogram to be completed. Waiting for results. -     [facility] 3D NASIR W MAMMO BI SCREENING INCL CAD; Future    4. Obesity (BMI 30.0-34. 9)  Continue with current program to work on weight loss. Advised pt to purchase a FitBit or similar device. Discussed that a healthy lifestyle requires 5,000-7,000 steps daily, but weight loss requires 10,000 steps daily. 5. Arthritis  She is not interested in completing surgery at this time. SUBJECTIVE/OBJECTIVE:  HPI     Patient presents today for a follow up visit. Her weight has changed from 180 lbs on 2020 to 170 lbs today. She has been attending a program to work on losing weight. She c/o frequent constipation, abdominal distention, and abdominal pain.  She takes Pericolace 8.6-50 mg and she notes the medication works well. She takes HCTZ 25 mg for HTN. Her BP today is 137/85. She c/o L hip pain. She had surgery on her R hip and lower back in the past. She was recommended to complete surgery on her L hip but she is not interested in surgery. She takes metformin 750 mg for diabetes. She completed a colonoscopy in 2019 and she had a few polyps but they were benign. She completed a DEXA scan in 2015 and results were unremarkable. Patient Active Problem List   Diagnosis Code    DDD (degenerative disc disease), lumbar M51.36    Pulmonary nodule, left R91.1    Diabetes mellitus type 2, controlled ([address] E11.9    Benign essential hypertension I10    Hyperlipidemia with target LDL less than 100 E78.5    Obesity (BMI 30-39. 9) E66.9    Anxiety disorder F41.9    Status post lumbar surgery Z98.890    Hypercalcemia E83.52    Hx of bilateral breast reduction surgery Z98.890    S/P hip replacement, right Z96.641        Current Outpatient Medications on File Prior to Visit   Medication Sig Dispense Refill    metFORMIN ER (GLUCOPHAGE XR) 750 mg tablet TAKE 1 TABLET BY MOUTH EVERY DAY 90 Tab 1    hydroCHLOROthiazide (HYDRODIURIL) 25 mg tablet TAKE ONE TABLET BY MOUTH DAILY 90 Tab 3    lancets misc Use as directed. Dx: E11.9 100 Each 1    senna-docusate (PERICOLACE) 8.6-50 mg per tablet Take 1 Tab by mouth two (2) times a day. 30 Tab 0    ascorbic acid/multivit-min (EMERGEN-C PO) Take 1,000 mg by mouth daily.  aspirin delayed-release 81 mg tablet Take  by mouth daily.  TURMERIC PO Take 500 mg by mouth daily.  potassium 99 mg tablet Take 99 mg by mouth every other day.  LUTEIN PO Take 25 mcg by mouth daily.  OTHER Berta daily (500 mg)      fish,bora,flax oils-om3,6,9no1 (OMEGA 3-6-9) 1,200 mg cap Take  by mouth.  loratadine (CLARITIN) 10 mg tablet Take 10 mg by mouth daily.       B.infantis-B.ani-B.long-B.bifi (PROBIOTIC 4X) 10-15 mg TbEC Take  by mouth daily.  ONETOUCH DELICA LANCETS 30 gauge misc       magnesium 250 mg Tab Take 400 mg by mouth daily.  melatonin 3 mg tablet Take 3 mg by mouth nightly.  Blood-Glucose Meter monitoring kit Check blood sugar 3 times a day 1 Kit 0    glucose blood VI test strips (BLOOD GLUCOSE TEST) strip Test as directed in clinic. Dx: Diabetes Mellitus 100 Strip 0    glucose blood VI test strips (ONETOUCH ULTRA BLUE TEST STRIP) strip Check three times a day. Fluctuating blood sugar 100 Strip 3    OTHER bromium 500 mg every morning (Patient not taking: Reported on 9/28/2021)      coenzyme q10 (CO Q-10) 10 mg cap Take 100 mg by mouth daily. (Patient not taking: Reported on 9/28/2021)      zinc 50 mg tab tablet Take 50 mg by mouth daily. (Patient not taking: Reported on 9/28/2021)       No current facility-administered medications on file prior to visit. Allergies   Allergen Reactions    Ceclor [Cefaclor] Rash     Amilcar Trinidad    Sulfa (Sulfonamide Antibiotics) Anaphylaxis, Shortness of Breath and Rash    Invokana [Canagliflozin] Other (comments)     Alopecia and yeast     Micardis [Telmisartan] Myalgia     Leg muscle pain - subsided with discontinuation    Azithromycin Nausea Only     Pt states she is allergic to all mycin drugs; GI upset    Ciprofloxacin Other (comments)     Headache    Lexapro [Escitalopram] Other (comments)     Pt states she had arm pains and vision changes.  Losartan Cough    Other Medication Other (comments)     DERMABOND GLUE, HAD BREAST REDUCTION AND WOUND ALMOST DEHISCED.     Tetracycline Other (comments)     Stomach issues      Zyrtec [Cetirizine] Other (comments)     Pt states that it makes her heart race       Past Medical History:   Diagnosis Date    Arthritis     degenerative & generalized    Chronic pain     right hip    Diabetes mellitus type 2, controlled ([facility] [address] 8/5/2015    Elevated liver enzymes     H/O bone density study 10/12    normal    H/O seasonal allergies     Hypercalcemia 5/8/2017    Hypertension     resolved last in May    Nausea & vomiting     Other and unspecified hyperlipidemia 8/5/2015    Psychiatric disorder     anxiety no meds       Past Surgical History:   Procedure Laterality Date    HX ABDOMINOPLASTY  2003    HX BREAST REDUCTION Bilateral 11/21/2017    BILATERAL BREAST REDUCTION performed by Brie Anderson MD at [address]  [address]  [phone]    HX CYST REMOVAL Right 2008    ganglion - wrist    HX GI  11/2011    colonoscopy-normal-10 years-done in [address]    BTL    HX HEENT Right 2007    Lasik    HX ORTHOPAEDIC  06/21/2016    back surgery L3- L5 , screws and rods and plates    HX OTHER SURGICAL Right 1991    plantar wart of FOOT & 3th and 5th toe ? procedure    HX TONSILLECTOMY  1967       Family History   Problem Relation Age of Onset    Hypertension Mother     Diabetes Mother     Arthritis-osteo Mother     Cancer Father         lung, ?brain    Other Paternal Aunt         ruptured brain aneurysm - HTN    Hypertension Sister         bilat THR    Arthritis-osteo Sister     Coronary Artery Disease Brother         stent    Arthritis-osteo Brother     No Known Problems Brother     Lung Disease Brother     Other Maternal Grandmother         during childbirth    Hypertension Sister     Anesth Problems Neg Hx        Social History     Socioeconomic History    Marital status:      Spouse name: Single    Number of children: 2    Years of education: BA    Highest education level: Not on file   Occupational History    Occupation: NP     Comment: [employer]   Tobacco Use    Smoking status: Never Smoker    Smokeless tobacco: Never Used   Vaping Use    Vaping Use: Never used   Substance and Sexual Activity    Alcohol use:  Yes     Alcohol/week: 0.0 standard drinks     Comment: very rare    Drug use: No    Sexual activity: Not Currently Other Topics Concern    Not on file   Social History Narrative    ** Merged History Encounter **          Social Determinants of Health     Financial Resource Strain:     Difficulty of Paying Living Expenses:    Food Insecurity:     Worried About Running Out of Food in the Last Year:     Ran Out of Food in the Last Year:    Transportation Needs:     Lack of Transportation (Medical):  Lack of Transportation (Non-Medical):    Physical Activity:     Days of Exercise per Week:     Minutes of Exercise per Session:    Stress:     Feeling of Stress :    Social Connections:     Frequency of Communication with Friends and Family:     Frequency of Social Gatherings with Friends and Family:     Attends Mosque Services:     Active Member of Clubs or Organizations:     Attends Club or Organization Meetings:     Marital Status:    Intimate Partner Violence:     Fear of Current or Ex-Partner:     Emotionally Abused:     Physically Abused:     Sexually Abused:        Abstract on 09/07/2021   Component Date Value Ref Range Status    Hemoglobin A1c, External 06/10/2021 6.3  % Final    [facility]       Review of Systems   Constitutional: Negative for activity change, fatigue and unexpected weight change. HENT: Negative for congestion, hearing loss, rhinorrhea and sore throat. Eyes: Negative for discharge. Respiratory: Negative for cough, chest tightness and shortness of breath. Cardiovascular: Negative for leg swelling. Gastrointestinal: Positive for abdominal distention, abdominal pain and constipation. Negative for diarrhea. Genitourinary: Negative for dysuria, flank pain, frequency and urgency. Musculoskeletal: Positive for arthralgias. Negative for back pain and myalgias. Skin: Negative for color change and rash. Neurological: Negative for dizziness, light-headedness and headaches. Psychiatric/Behavioral: Negative for dysphoric mood and sleep disturbance.  The patient is not nervous/anxious. Visit Vitals  /85 (BP 1 Location: Left arm)   Pulse 71   Temp 97.1 °F (36.2 °C)   Resp 15   Ht 5' 3\" (1.6 m)   Wt 170 lb 9.6 oz (77.4 kg)   SpO2 100%   BMI 30.22 kg/m²       Physical Exam  Vitals and nursing note reviewed. Constitutional:       General: She is not in acute distress. Appearance: Normal appearance. She is well-developed. She is not diaphoretic. HENT:      Right Ear: External ear normal.      Left Ear: External ear normal.   Eyes:      General: No scleral icterus. Right eye: No discharge. Left eye: No discharge. Extraocular Movements: Extraocular movements intact. Conjunctiva/sclera: Conjunctivae normal.   Cardiovascular:      Rate and Rhythm: Normal rate and regular rhythm. Pulmonary:      Effort: Pulmonary effort is normal.      Breath sounds: Normal breath sounds. No wheezing. Abdominal:      General: Bowel sounds are normal.      Palpations: Abdomen is soft. Tenderness: There is no abdominal tenderness. Musculoskeletal:      Cervical back: Normal range of motion and neck supple. Feet:      Comments: Diabetic foot exam:     Left: Vibratory sensation normal    Sharp/dull discrimination normal    Filament test normal sensation with micro filament   Pulse DP: 2+ (normal)   Deformities: None  Right: Vibratory sensation normal   Sharp/dull discrimination normal   Filament test normal sensation with micro filament   Pulse DP: 2+ (normal)   Deformities: None    Lymphadenopathy:      Cervical: No cervical adenopathy. Neurological:      Mental Status: She is alert and oriented to person, place, and time. Psychiatric:         Mood and Affect: Mood and affect normal.             An electronic signature was used to authenticate this note.   -- Kirsty Kyle

## 2021-09-28 NOTE — PROGRESS NOTES
Chief Complaint   Patient presents with    Medication Refill       Visit Vitals  BP (!) 150/86 (BP 1 Location: Left arm)   Pulse 71   Temp 97.1 °F (36.2 °C)   Resp 15   Ht 5' 3\" (1.6 m)   Wt 170 lb 9.6 oz (77.4 kg)   SpO2 100%   BMI 30.22 kg/m²       1. Have you been to the ER, urgent care clinic since your last visit? Hospitalized since your last visit? No    2. Have you seen or consulted any other health care providers outside of the [address] [place] [address] since your last visit? Include any pap smears or colon screening.  Yes [facility]

## 2021-09-29 DIAGNOSIS — I10 BENIGN ESSENTIAL HYPERTENSION: ICD-10-CM

## 2021-09-29 DIAGNOSIS — E11.9 CONTROLLED TYPE 2 DIABETES MELLITUS WITHOUT COMPLICATION, UNSPECIFIED WHETHER LONG TERM INSULIN USE (HCC): ICD-10-CM

## 2021-09-29 LAB
ALBUMIN SERPL-MCNC: 4.8 G/DL (ref 3.8–4.8)
ALBUMIN/CREAT UR: <9 MG/G CREAT (ref 0–29)
ALBUMIN/GLOB SERPL: 1.7 {RATIO} (ref 1.2–2.2)
ALP SERPL-CCNC: 74 IU/L (ref 44–121)
ALT SERPL-CCNC: 29 IU/L (ref 0–32)
AST SERPL-CCNC: 26 IU/L (ref 0–40)
BILIRUB SERPL-MCNC: 0.3 MG/DL (ref 0–1.2)
BUN SERPL-MCNC: 11 MG/DL (ref 8–27)
BUN/CREAT SERPL: 14 (ref 12–28)
CALCIUM SERPL-MCNC: 10.4 MG/DL (ref 8.7–10.3)
CHLORIDE SERPL-SCNC: 97 MMOL/L (ref 96–106)
CHOLEST SERPL-MCNC: 270 MG/DL (ref 100–199)
CO2 SERPL-SCNC: 26 MMOL/L (ref 20–29)
CREAT SERPL-MCNC: 0.76 MG/DL (ref 0.57–1)
CREAT UR-MCNC: 32.6 MG/DL
ERYTHROCYTE [DISTWIDTH] IN BLOOD BY AUTOMATED COUNT: 14.2 % (ref 11.7–15.4)
EST. AVERAGE GLUCOSE BLD GHB EST-MCNC: 137 MG/DL
GLOBULIN SER CALC-MCNC: 2.8 G/DL (ref 1.5–4.5)
GLUCOSE SERPL-MCNC: 97 MG/DL (ref 65–99)
HBA1C MFR BLD: 6.4 % (ref 4.8–5.6)
HCT VFR BLD AUTO: 41.9 % (ref 34–46.6)
HDLC SERPL-MCNC: 69 MG/DL
HGB BLD-MCNC: 13.7 G/DL (ref 11.1–15.9)
LDLC SERPL CALC-MCNC: 183 MG/DL (ref 0–99)
MCH RBC QN AUTO: 26.4 PG (ref 26.6–33)
MCHC RBC AUTO-ENTMCNC: 32.7 G/DL (ref 31.5–35.7)
MCV RBC AUTO: 81 FL (ref 79–97)
MICROALBUMIN UR-MCNC: <3 UG/ML
PLATELET # BLD AUTO: 307 X10E3/UL (ref 150–450)
POTASSIUM SERPL-SCNC: 4.7 MMOL/L (ref 3.5–5.2)
PROT SERPL-MCNC: 7.6 G/DL (ref 6–8.5)
RBC # BLD AUTO: 5.18 X10E6/UL (ref 3.77–5.28)
SODIUM SERPL-SCNC: 140 MMOL/L (ref 134–144)
TRIGL SERPL-MCNC: 103 MG/DL (ref 0–149)
TSH SERPL DL<=0.005 MIU/L-ACNC: 2.09 UIU/ML (ref 0.45–4.5)
VLDLC SERPL CALC-MCNC: 18 MG/DL (ref 5–40)
WBC # BLD AUTO: 6.5 X10E3/UL (ref 3.4–10.8)

## 2021-09-29 RX ORDER — HYDROCHLOROTHIAZIDE 25 MG/1
TABLET ORAL
Qty: 90 TABLET | Refills: 3 | Status: CANCELLED | OUTPATIENT
Start: 2021-09-29

## 2021-09-29 RX ORDER — METFORMIN HYDROCHLORIDE 750 MG/1
750 TABLET, EXTENDED RELEASE ORAL DAILY
Qty: 90 TABLET | Refills: 1 | Status: CANCELLED | OUTPATIENT
Start: 2021-09-29

## 2021-09-29 NOTE — TELEPHONE ENCOUNTER
Requested Prescriptions     Pending Prescriptions Disp Refills    hydroCHLOROthiazide (HYDRODIURIL) 25 mg tablet 90 Tablet 3     Sig: TAKE ONE TABLET BY MOUTH DAILY    metFORMIN ER (GLUCOPHAGE XR) 750 mg tablet 90 Tablet 1     Sig: Take 1 Tablet by mouth daily.         Last Visit 9/28/21  Last Refill   Metformin 5/8/21  HCTZ 5/1/20

## 2021-10-01 NOTE — PROGRESS NOTES
Results reviewed. Release via [facility],it was nice seeing you at your recent visit. Your blood report is back and I am happy to see Diabetes  numbers have improved but cholesterol has gone up. I think you are on Keto Diet that is why your readings are up. I would suggest cutting down on red meat & cardio exercise 3-4 times a week. Repeat labs in 3 months.

## 2021-10-29 DIAGNOSIS — I10 BENIGN ESSENTIAL HYPERTENSION: Primary | ICD-10-CM

## 2021-10-29 RX ORDER — HYDROCHLOROTHIAZIDE 12.5 MG/1
12.5 TABLET ORAL DAILY
Qty: 90 TABLET | Refills: 0 | Status: SHIPPED | OUTPATIENT
Start: 2021-10-29 | End: 2022-01-27

## 2021-11-13 DIAGNOSIS — E11.9 CONTROLLED TYPE 2 DIABETES MELLITUS WITHOUT COMPLICATION, UNSPECIFIED WHETHER LONG TERM INSULIN USE (HCC): ICD-10-CM

## 2021-11-13 RX ORDER — METFORMIN HYDROCHLORIDE 750 MG/1
TABLET, EXTENDED RELEASE ORAL
Qty: 90 TABLET | Refills: 1 | Status: SHIPPED | OUTPATIENT
Start: 2021-11-13 | End: 2022-05-18

## 2022-03-19 PROBLEM — Z98.890 HX OF BILATERAL BREAST REDUCTION SURGERY: Status: ACTIVE | Noted: 2018-10-03

## 2022-03-19 PROBLEM — Z96.641 S/P HIP REPLACEMENT, RIGHT: Status: ACTIVE | Noted: 2021-09-28

## 2022-03-19 PROBLEM — E83.52 HYPERCALCEMIA: Status: ACTIVE | Noted: 2017-05-08

## 2022-05-18 DIAGNOSIS — E11.9 CONTROLLED TYPE 2 DIABETES MELLITUS WITHOUT COMPLICATION, UNSPECIFIED WHETHER LONG TERM INSULIN USE (HCC): ICD-10-CM

## 2022-05-18 RX ORDER — METFORMIN HYDROCHLORIDE 750 MG/1
TABLET, EXTENDED RELEASE ORAL
Qty: 90 TABLET | Refills: 1 | Status: SHIPPED | OUTPATIENT
Start: 2022-05-18 | End: 2022-09-27 | Stop reason: ALTCHOICE

## 2022-09-27 ENCOUNTER — OFFICE VISIT (OUTPATIENT)
Dept: PRIMARY CARE CLINIC | Age: 62
End: 2022-09-27
Payer: COMMERCIAL

## 2022-09-27 VITALS
OXYGEN SATURATION: 100 % | DIASTOLIC BLOOD PRESSURE: 82 MMHG | TEMPERATURE: 97.8 F | BODY MASS INDEX: 30.76 KG/M2 | HEART RATE: 69 BPM | WEIGHT: 173.6 LBS | HEIGHT: 63 IN | RESPIRATION RATE: 17 BRPM | SYSTOLIC BLOOD PRESSURE: 151 MMHG

## 2022-09-27 DIAGNOSIS — Z79.4 TYPE 2 DIABETES MELLITUS WITH HYPERGLYCEMIA, WITH LONG-TERM CURRENT USE OF INSULIN (HCC): Primary | ICD-10-CM

## 2022-09-27 DIAGNOSIS — E78.2 MIXED HYPERLIPIDEMIA: ICD-10-CM

## 2022-09-27 DIAGNOSIS — M16.12 ARTHRITIS OF LEFT HIP: ICD-10-CM

## 2022-09-27 DIAGNOSIS — E11.65 TYPE 2 DIABETES MELLITUS WITH HYPERGLYCEMIA, WITH LONG-TERM CURRENT USE OF INSULIN (HCC): Primary | ICD-10-CM

## 2022-09-27 DIAGNOSIS — I10 PRIMARY HYPERTENSION: ICD-10-CM

## 2022-09-27 PROCEDURE — 99214 OFFICE O/P EST MOD 30 MIN: CPT | Performed by: INTERNAL MEDICINE

## 2022-09-27 RX ORDER — HYDROCHLOROTHIAZIDE 12.5 MG/1
12.5 TABLET ORAL DAILY
Qty: 90 TABLET | Refills: 0 | Status: CANCELLED | OUTPATIENT
Start: 2022-09-27 | End: 2022-12-26

## 2022-09-27 RX ORDER — CHLORTHALIDONE 25 MG/1
25 TABLET ORAL DAILY
Qty: 30 TABLET | Refills: 1 | Status: SHIPPED | OUTPATIENT
Start: 2022-09-27 | End: 2022-10-24

## 2022-09-27 RX ORDER — METFORMIN HYDROCHLORIDE 850 MG/1
850 TABLET ORAL 2 TIMES DAILY WITH MEALS
Qty: 60 TABLET | Refills: 2 | Status: SHIPPED | OUTPATIENT
Start: 2022-09-27 | End: 2022-10-08 | Stop reason: SINTOL

## 2022-09-27 RX ORDER — METFORMIN HYDROCHLORIDE 750 MG/1
750 TABLET, EXTENDED RELEASE ORAL DAILY
Qty: 90 TABLET | Refills: 1 | Status: CANCELLED | OUTPATIENT
Start: 2022-09-27

## 2022-09-27 NOTE — PROGRESS NOTES
Aminta Jones (: 1960) is a 57 y.o. female, established patient, here for evaluation of the following chief complaint(s):  Diabetes (Elevated sugars in 300 treating herself over the past month, is wanting A1c/Has been taking two tabs for Metformin, and using insulin)       ASSESSMENT/PLAN:  Below is the assessment and plan developed based on review of pertinent history, physical exam, labs, studies, and medications. 1. Type 2 diabetes mellitus with hyperglycemia, with long-term current use of insulin ([address]  I increased her metformin and recommended taking Ozempic or Trulicity but she refused to take any injectables. -     METABOLIC PANEL, COMPREHENSIVE; Future  -     CBC W/O DIFF; Future  -     MICROALBUMIN, UR, RAND W/ MICROALB/CREAT RATIO; Future  -      DIABETES FOOT EXAM  -     HEMOGLOBIN A1C WITH EAG; Future  -     metFORMIN (GLUCOPHAGE) 850 mg tablet; Take 1 Tablet by mouth two (2) times daily (with meals) for 90 days. , Normal, Disp-60 Tablet, R-2  2. Primary hypertension  I switched her to chlorthalidone as her calcium always stays on the higher side. -     chlorthalidone (HYGROTON) 25 mg tablet; Take 1 Tablet by mouth daily for 30 days. , Normal, Disp-30 Tablet, R-1  -     TSH 3RD GENERATION; Future  3. Mixed hyperlipidemia  She is not taking any medication for high cholesterol. I told her if the cholesterol comes back high I would recommend her starting statin. -     LIPID PANEL; Future  4. Arthritis of left hip  Told her with the weight loss hip pain should improve. She already had right hip surgery and she can go back to the Ortho to see if she needs left hip surgery as well. SUBJECTIVE/OBJECTIVE:  Patient seen today for follow-up. She has not been in the practice for almost a year now as she did not have an insurance for a long time.   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  She is looking for another job at this time and has been under stress lately. She has been  checking  her blood sugars and they were running high ,  few readings were in 300s so she went to the [facility] clinic and got increased dose of metformin prescription and insulin. She took insulin only twice and started working on diet. She is on metformin 750 mg twice a day  and her blood sugars have come down in 100s. She is not taking insulin anymore. She ran out of her hydrochlorothiazide few days ago and has not been taking anything for blood pressure. Denies any headaches or blurred vision. She had seen an ophthalmologist in June and everything was fine. She is still having pain in her left hip so she cannot do much physical activity. She is fasting for her labs today. Visit Vitals  BP (!) 151/82 (BP 1 Location: Right arm, BP Patient Position: Sitting, BP Cuff Size: Large adult)   Pulse 69   Temp 97.8 °F (36.6 °C)   Resp 17   Ht 5' 3\" (1.6 m)   Wt 173 lb 9.6 oz (78.7 kg)   SpO2 100%   BMI 30.75 kg/m²     Current Outpatient Medications   Medication Sig Dispense Refill    metFORMIN (GLUCOPHAGE) 850 mg tablet Take 1 Tablet by mouth two (2) times daily (with meals) for 90 days. 60 Tablet 2    chlorthalidone (HYGROTON) 25 mg tablet Take 1 Tablet by mouth daily for 30 days. 30 Tablet 1    Blood-Glucose Meter monitoring kit Check blood sugar 3 times a day 1 Kit 0    glucose blood VI test strips (BLOOD GLUCOSE TEST) strip Test as directed in clinic. Dx: Diabetes Mellitus 100 Strip 0    glucose blood VI test strips (ONETOUCH ULTRA BLUE TEST STRIP) strip Check three times a day. Fluctuating blood sugar 100 Strip 3    lancets misc Use as directed. Dx: E11.9 100 Each 1    senna-docusate (PERICOLACE) 8.6-50 mg per tablet Take 1 Tab by mouth two (2) times a day. 30 Tab 0    aspirin delayed-release 81 mg tablet Take  by mouth daily. TURMERIC PO Take 500 mg by mouth daily.       LUTEIN PO Take 25 mcg by mouth daily.      OTHER Berta daily (500 mg)      fish,bora,flax oils-om3,6,9no1 1,200 mg cap Take  by mouth.      loratadine (CLARITIN) 10 mg tablet Take 10 mg by mouth daily. B.animalis,bifid,infantis,long 10-15 mg TbEC Take  by mouth daily. ONETOUCH DELICA LANCETS 30 gauge misc       magnesium 250 mg Tab Take 400 mg by mouth daily. melatonin 3 mg tablet Take 3 mg by mouth nightly. ascorbic acid/multivit-min (EMERGEN-C PO) Take 1,000 mg by mouth daily. (Patient not taking: Reported on 9/27/2022)      potassium 99 mg tablet Take 99 mg by mouth every other day. (Patient not taking: Reported on 9/27/2022)      OTHER bromium 500 mg every morning (Patient not taking: Reported on 9/27/2022)      coenzyme q10 10 mg cap Take 100 mg by mouth daily. (Patient not taking: No sig reported)      zinc 50 mg tab tablet Take 50 mg by mouth daily.  (Patient not taking: Reported on 9/27/2022)       Past Medical History:   Diagnosis Date    Arthritis     degenerative & generalized    Chronic pain     right hip    Diabetes mellitus type 2, controlled ([facility] [address] 8/5/2015    Elevated liver enzymes     H/O bone density study 10/12    normal    H/O seasonal allergies     Hypercalcemia 5/8/2017    Hypertension     resolved last in May    Nausea & vomiting     Other and unspecified hyperlipidemia 8/5/2015    Psychiatric disorder     anxiety no meds     Past Surgical History:   Procedure Laterality Date    HX ABDOMINOPLASTY  2003    HX BREAST REDUCTION Bilateral 11/21/2017    BILATERAL BREAST REDUCTION performed by Reji Gale MD at [address]  [phone]    HX CYST REMOVAL Right 2008    ganglion - wrist    HX GI  11/2011    colonoscopy-normal-10 years-done in [place]    HX GYN  1986    BTL    HX HEENT Right 2007    Lasik    HX ORTHOPAEDIC  06/21/2016    back surgery L3- L5 , screws and rods and plates    HX OTHER SURGICAL Right 1991    plantar wart of FOOT & 3th and 5th toe ? procedure    HX TONSILLECTOMY 1967     Lab Results   Component Value Date/Time    WBC 6.5 09/28/2021 11:11 AM    HGB 13.7 09/28/2021 11:11 AM    HCT 41.9 09/28/2021 11:11 AM    PLATELET 585 13/27/4901 11:11 AM    MCV 81 09/28/2021 11:11 AM     Lab Results   Component Value Date/Time    Hemoglobin A1c 6.4 (H) 09/28/2021 11:11 AM    Hemoglobin A1c 6.8 (H) 11/05/2020 07:31 AM    Hemoglobin A1c 6.7 (H) 10/14/2019 01:11 PM    Hemoglobin A1c, External 6.3 06/10/2021 12:00 AM    Glucose 97 09/28/2021 11:11 AM    Glucose (POC) 109 (H) 10/10/2018 11:49 AM    Microalb/Creat ratio (ug/mg creat.) <9 09/28/2021 11:11 AM    LDL, calculated 183 (H) 09/28/2021 11:11 AM    LDL, calculated 156.8 (H) 11/05/2020 07:31 AM    Creatinine 0.76 09/28/2021 11:11 AM      Lab Results   Component Value Date/Time    Cholesterol, total 270 (H) 09/28/2021 11:11 AM    HDL Cholesterol 69 09/28/2021 11:11 AM    LDL, calculated 183 (H) 09/28/2021 11:11 AM    LDL, calculated 156.8 (H) 11/05/2020 07:31 AM    Triglyceride 103 09/28/2021 11:11 AM    CHOL/HDL Ratio 4.2 11/05/2020 07:31 AM     Lab Results   Component Value Date/Time    ALT (SGPT) 29 09/28/2021 11:11 AM    GGT 78 (H) 07/13/2017 09:14 AM    Alk.  phosphatase 74 09/28/2021 11:11 AM    Bilirubin, total 0.3 09/28/2021 11:11 AM    Albumin 4.8 09/28/2021 11:11 AM    Protein, total 7.6 09/28/2021 11:11 AM    INR 1.0 09/28/2018 11:38 AM    Prothrombin time 10.3 09/28/2018 11:38 AM    PLATELET 051 21/56/2463 11:11 AM       Lab Results   Component Value Date/Time    GFR est non-AA 84 09/28/2021 11:11 AM    GFR est AA 97 09/28/2021 11:11 AM    Creatinine 0.76 09/28/2021 11:11 AM    BUN 11 09/28/2021 11:11 AM    Sodium 140 09/28/2021 11:11 AM    Potassium 4.7 09/28/2021 11:11 AM    Chloride 97 09/28/2021 11:11 AM    CO2 26 09/28/2021 11:11 AM    PTH, Intact 23 07/13/2017 09:14 AM    Visit Vitals  BP (!) 151/82 (BP 1 Location: Right arm, BP Patient Position: Sitting, BP Cuff Size: Large adult)   Pulse 69   Temp 97.8 °F (36.6 °C) Resp 17   Ht 5' 3\" (1.6 m)   Wt 173 lb 9.6 oz (78.7 kg)   SpO2 100%   BMI 30.75 kg/m²       Review of Systems   Constitutional:  Negative for activity change, fatigue and unexpected weight change. HENT:  Negative for congestion, hearing loss, rhinorrhea and sore throat. Eyes:  Negative for discharge. Respiratory:  Negative for cough, chest tightness and shortness of breath. Cardiovascular:  Negative for palpitations and leg swelling. Gastrointestinal:  Negative for abdominal pain, constipation and diarrhea. Genitourinary:  Negative for dysuria, flank pain, frequency and urgency. Musculoskeletal:  Negative for arthralgias, back pain and myalgias. Skin:  Negative for color change and rash. Neurological:  Negative for dizziness, light-headedness and headaches. Psychiatric/Behavioral:  Negative for dysphoric mood and sleep disturbance. The patient is not nervous/anxious. Physical Exam    Vitals and nursing note reviewed. Constitutional:       Appearance: Normal appearance. obese. Eyes:      Extraocular Movements: Extraocular movements intact. Pupils: Pupils are equal, round, and reactive to light. Cardiovascular:      Rate and Rhythm: Normal rate and regular rhythm. Pulmonary:      Effort: Pulmonary effort is normal.      Breath sounds: Normal breath sounds. Abdominal:      General: Bowel sounds are normal. There is no distension. Palpations: Abdomen is soft. Musculoskeletal:         General: No swelling. Normal range of motion. Cervical back: Normal range of motion and neck supple. Right lower leg: No edema. Left lower leg: No edema. Neurological:      General: No focal deficit present. Mental Status:  Alert and oriented to person, place, and time. Motor: No weakness.    Psychiatric:         Mood and Affect: Mood normal.         Behavior: Behavior normal.    Diabetic foot exam:     Left: Vibratory sensation normal    Sharp/dull discrimination normal    Filament test normal sensation with micro filament   Pulse DP: 2+ (normal)   Deformities: None  Right: Vibratory sensation normal   Sharp/dull discrimination normal   Filament test normal sensation with micro filament   Pulse DP: 2+ (normal)   Deformities: None       An electronic signature was used to authenticate this note.   -- Adonay Webb MD

## 2022-09-27 NOTE — PROGRESS NOTES
Chief Complaint   Patient presents with    Diabetes     Elevated sugars in 300 treating herself over the past month, is wanting A1c  Has been taking two tabs for Metformin, and using insulin       Visit Vitals  BP (!) 151/82 (BP 1 Location: Right arm, BP Patient Position: Sitting, BP Cuff Size: Large adult)   Pulse 69   Temp 97.8 °F (36.6 °C)   Resp 17   Ht 5' 3\" (1.6 m)   Wt 173 lb 9.6 oz (78.7 kg)   SpO2 100%   BMI 30.75 kg/m²       1. Have you been to the ER, urgent care clinic since your last visit? Hospitalized since your last visit? Yes sinus infection    2. Have you seen or consulted any other health care providers outside of the [address] [place] [address] since your last visit? Include any pap smears or colon screening. No      3. For patients aged 39-70: Has the patient had a colonoscopy / FIT/ Cologuard? Yes - Care Gap present. Rooming MA/LPN to request most recent results      If the patient is female:    4. For patients aged 41-77: Has the patient had a mammogram within the past 2 years? Yes - Care Gap present. Rooming MA/LPN to request most recent results      5. For patients aged 21-65: Has the patient had a pap smear? Yes - Care Gap present.  Rooming MA/LPN to request most recent results

## 2022-09-28 LAB
ALBUMIN SERPL-MCNC: 4.8 G/DL (ref 3.8–4.8)
ALBUMIN/CREAT UR: 4 MG/G CREAT (ref 0–29)
ALBUMIN/GLOB SERPL: 1.8 {RATIO} (ref 1.2–2.2)
ALP SERPL-CCNC: 64 IU/L (ref 44–121)
ALT SERPL-CCNC: 41 IU/L (ref 0–32)
AST SERPL-CCNC: 32 IU/L (ref 0–40)
BILIRUB SERPL-MCNC: 0.3 MG/DL (ref 0–1.2)
BUN SERPL-MCNC: 19 MG/DL (ref 8–27)
BUN/CREAT SERPL: 28 (ref 12–28)
CALCIUM SERPL-MCNC: 10.4 MG/DL (ref 8.7–10.3)
CHLORIDE SERPL-SCNC: 103 MMOL/L (ref 96–106)
CHOLEST SERPL-MCNC: 226 MG/DL (ref 100–199)
CO2 SERPL-SCNC: 24 MMOL/L (ref 20–29)
CREAT SERPL-MCNC: 0.68 MG/DL (ref 0.57–1)
CREAT UR-MCNC: 148.6 MG/DL
EGFR: 97 ML/MIN/1.73
ERYTHROCYTE [DISTWIDTH] IN BLOOD BY AUTOMATED COUNT: 14.8 % (ref 11.7–15.4)
EST. AVERAGE GLUCOSE BLD GHB EST-MCNC: 232 MG/DL
GLOBULIN SER CALC-MCNC: 2.7 G/DL (ref 1.5–4.5)
GLUCOSE SERPL-MCNC: 105 MG/DL (ref 70–99)
HBA1C MFR BLD: 9.7 % (ref 4.8–5.6)
HCT VFR BLD AUTO: 44.9 % (ref 34–46.6)
HDLC SERPL-MCNC: 46 MG/DL
HGB BLD-MCNC: 13.7 G/DL (ref 11.1–15.9)
LDLC SERPL CALC-MCNC: 163 MG/DL (ref 0–99)
MCH RBC QN AUTO: 24.7 PG (ref 26.6–33)
MCHC RBC AUTO-ENTMCNC: 30.5 G/DL (ref 31.5–35.7)
MCV RBC AUTO: 81 FL (ref 79–97)
MICROALBUMIN UR-MCNC: 6.5 UG/ML
PLATELET # BLD AUTO: 325 X10E3/UL (ref 150–450)
POTASSIUM SERPL-SCNC: 4.4 MMOL/L (ref 3.5–5.2)
PROT SERPL-MCNC: 7.5 G/DL (ref 6–8.5)
RBC # BLD AUTO: 5.55 X10E6/UL (ref 3.77–5.28)
SODIUM SERPL-SCNC: 144 MMOL/L (ref 134–144)
TRIGL SERPL-MCNC: 93 MG/DL (ref 0–149)
TSH SERPL DL<=0.005 MIU/L-ACNC: 1.43 UIU/ML (ref 0.45–4.5)
VLDLC SERPL CALC-MCNC: 17 MG/DL (ref 5–40)
WBC # BLD AUTO: 5.9 X10E3/UL (ref 3.4–10.8)

## 2022-09-29 NOTE — PROGRESS NOTES
Results reviewed. Release via David Lopez, it was nice seeing you in the office. Your blood report is back and I am surprised to see HbA1C came back VERY high. Please try new dose of Metformin and exercise. Continue low carb diet and intermittent fasting. Will repeat HbA1C on your next visit to see if numbers are improving otherwise we need to add another medication. Cholesterol numbers are improving and will see on next blood work if there is further improvement.

## 2022-10-08 DIAGNOSIS — E11.9 CONTROLLED TYPE 2 DIABETES MELLITUS WITHOUT COMPLICATION, WITHOUT LONG-TERM CURRENT USE OF INSULIN (HCC): Primary | ICD-10-CM

## 2022-10-08 RX ORDER — METFORMIN HYDROCHLORIDE 500 MG/1
500 TABLET, EXTENDED RELEASE ORAL 2 TIMES DAILY
Qty: 180 TABLET | Refills: 0 | Status: SHIPPED | OUTPATIENT
Start: 2022-10-08 | End: 2023-01-06

## 2022-10-24 DIAGNOSIS — I10 PRIMARY HYPERTENSION: ICD-10-CM

## 2022-10-24 RX ORDER — CHLORTHALIDONE 25 MG/1
TABLET ORAL
Qty: 30 TABLET | Refills: 1 | Status: SHIPPED | OUTPATIENT
Start: 2022-10-24 | End: 2022-11-25

## 2022-11-24 DIAGNOSIS — I10 PRIMARY HYPERTENSION: ICD-10-CM

## 2022-11-25 RX ORDER — CHLORTHALIDONE 25 MG/1
TABLET ORAL
Qty: 30 TABLET | Refills: 1 | Status: SHIPPED | OUTPATIENT
Start: 2022-11-25

## 2022-12-30 DIAGNOSIS — I10 PRIMARY HYPERTENSION: ICD-10-CM

## 2022-12-30 RX ORDER — CHLORTHALIDONE 25 MG/1
TABLET ORAL
Qty: 30 TABLET | Refills: 1 | Status: SHIPPED | OUTPATIENT
Start: 2022-12-30

## 2023-01-04 DIAGNOSIS — E11.9 CONTROLLED TYPE 2 DIABETES MELLITUS WITHOUT COMPLICATION, WITHOUT LONG-TERM CURRENT USE OF INSULIN (HCC): ICD-10-CM

## 2023-01-04 RX ORDER — METFORMIN HYDROCHLORIDE 500 MG/1
500 TABLET, EXTENDED RELEASE ORAL 2 TIMES DAILY
Qty: 180 TABLET | Refills: 0 | Status: SHIPPED | OUTPATIENT
Start: 2023-01-04 | End: 2023-04-04

## 2023-01-28 DIAGNOSIS — I10 PRIMARY HYPERTENSION: ICD-10-CM

## 2023-01-29 RX ORDER — CHLORTHALIDONE 25 MG/1
TABLET ORAL
Qty: 30 TABLET | Refills: 1 | Status: SHIPPED | OUTPATIENT
Start: 2023-01-29

## 2023-03-01 DIAGNOSIS — I10 PRIMARY HYPERTENSION: ICD-10-CM

## 2023-03-01 RX ORDER — CHLORTHALIDONE 25 MG/1
TABLET ORAL
Qty: 30 TABLET | Refills: 1 | Status: SHIPPED | OUTPATIENT
Start: 2023-03-01

## 2023-03-29 ENCOUNTER — OFFICE VISIT (OUTPATIENT)
Dept: PRIMARY CARE CLINIC | Age: 63
End: 2023-03-29
Payer: COMMERCIAL

## 2023-03-29 VITALS
TEMPERATURE: 97.5 F | SYSTOLIC BLOOD PRESSURE: 136 MMHG | HEIGHT: 63 IN | RESPIRATION RATE: 17 BRPM | DIASTOLIC BLOOD PRESSURE: 85 MMHG | HEART RATE: 65 BPM | OXYGEN SATURATION: 100 % | WEIGHT: 166.8 LBS | BODY MASS INDEX: 29.55 KG/M2

## 2023-03-29 DIAGNOSIS — K05.00 ACUTE GINGIVITIS: ICD-10-CM

## 2023-03-29 DIAGNOSIS — E66.3 OVERWEIGHT (BMI 25.0-29.9): ICD-10-CM

## 2023-03-29 DIAGNOSIS — E78.2 MIXED HYPERLIPIDEMIA: ICD-10-CM

## 2023-03-29 DIAGNOSIS — Z12.31 ENCOUNTER FOR SCREENING MAMMOGRAM FOR MALIGNANT NEOPLASM OF BREAST: ICD-10-CM

## 2023-03-29 DIAGNOSIS — E11.9 CONTROLLED TYPE 2 DIABETES MELLITUS WITHOUT COMPLICATION, WITHOUT LONG-TERM CURRENT USE OF INSULIN (HCC): ICD-10-CM

## 2023-03-29 DIAGNOSIS — M81.0 POSTMENOPAUSAL BONE LOSS: ICD-10-CM

## 2023-03-29 DIAGNOSIS — I10 PRIMARY HYPERTENSION: ICD-10-CM

## 2023-03-29 DIAGNOSIS — E11.9 CONTROLLED TYPE 2 DIABETES MELLITUS WITHOUT COMPLICATION, WITHOUT LONG-TERM CURRENT USE OF INSULIN (HCC): Primary | ICD-10-CM

## 2023-03-29 PROBLEM — E83.52 HYPERCALCEMIA: Status: RESOLVED | Noted: 2017-05-08 | Resolved: 2023-03-29

## 2023-03-29 PROCEDURE — 3079F DIAST BP 80-89 MM HG: CPT | Performed by: INTERNAL MEDICINE

## 2023-03-29 PROCEDURE — 3075F SYST BP GE 130 - 139MM HG: CPT | Performed by: INTERNAL MEDICINE

## 2023-03-29 PROCEDURE — 99214 OFFICE O/P EST MOD 30 MIN: CPT | Performed by: INTERNAL MEDICINE

## 2023-03-29 RX ORDER — AMOXICILLIN AND CLAVULANATE POTASSIUM 875; 125 MG/1; MG/1
1 TABLET, FILM COATED ORAL 2 TIMES DAILY
Qty: 20 TABLET | Refills: 0 | Status: SHIPPED | OUTPATIENT
Start: 2023-03-29 | End: 2023-04-08

## 2023-03-29 NOTE — PROGRESS NOTES
Chief Complaint   Patient presents with    Diabetes       Visit Vitals  /85 (BP 1 Location: Left upper arm, BP Patient Position: Sitting)   Pulse 65   Temp 97.5 °F (36.4 °C)   Resp 17   Ht 5' 3\" (1.6 m)   Wt 166 lb 12.8 oz (75.7 kg)   SpO2 100%   BMI 29.55 kg/m²          1. Have you been to the ER, urgent care clinic since your last visit? Hospitalized since your last visit? No    2. Have you seen or consulted any other health care providers outside of the [address] [place] [address] since your last visit? Include any pap smears or colon screening. Yes dentist      3. For patients aged 39-70: Has the patient had a colonoscopy / FIT/ Cologuard? Yes - Care Gap present. Most recent result on file      If the patient is female:    4. For patients aged 41-77: Has the patient had a mammogram within the past 2 years? Yes - Care Gap present. Most recent result on file  Yes - Care Gap present. Most recent result on file    5. For patients aged 21-65: Has the patient had a pap smear? Yes - Care Gap present.  Most recent result on file

## 2023-03-29 NOTE — PROGRESS NOTES
Leeroy Dixon (: 1960) is a 57 y.o. female, established patient, here for evaluation of the following chief complaint(s):  Diabetes      ASSESSMENT/PLAN:  Below is the assessment and plan developed based on review of pertinent history, physical exam, labs, studies, and medications. 1. Controlled type 2 diabetes mellitus without complication, without long-term current use of insulin (HCC)  -     HEMOGLOBIN A1C WITH EAG; Future  -     CBC W/O DIFF; Future  I ordered blood work to check hemoglobin A1C levels and a CBC. I recommend that she continue taking metformin  mg BID. 2. Primary hypertension  -     METABOLIC PANEL, COMPREHENSIVE; Future  I ordered a CMP. I recommend that she continue taking chlorthalidone 25 mg daily. 3. Mixed hyperlipidemia  -     LIPID PANEL; Future  I ordered a lipid panel. 4. Postmenopausal bone loss  -     DEXA BONE DENSITY STUDY AXIAL; Future  I ordered a DEXA scan. 5. Encounter for screening mammogram for malignant neoplasm of breast  -     [facility] 3D NASIR W MAMMO BI SCREENING INCL CAD; Future  I ordered a mammogram.    6. Overweight (BMI 25.0-29. 9)  I recommend watching and decreasing carbohydrate intake. I explained that 5,000 steps are needed daily for healthy lifestyle and to maintain conditioning, and she will need to increase to 10,000 a day to work on weight loss. 7. Acute gingivitis  -     amoxicillin-clavulanate (AUGMENTIN) 875-125 mg per tablet; Take 1 Tablet by mouth two (2) times a day for 10 days. , Normal, Disp-20 Tablet, R-0 sent to pharmacy. I prescribed Augmentin 875-125 mg BID for 10 days. Potential side effects were discussed. SUBJECTIVE/OBJECTIVE:  HPI  The patient presents today for follow up on chronic conditions. She is fasting today. Her BP is elevated today in office at 168/84, 136/85 on manual repeat in left arm while sitting down. She has not been checking her BP at home. She is taking chlorthalidone 25 mg daily.     She is taking metformin  mg BID. She says that it has been effective for her. She has lost 7 lbs since her last appointment. She strives to exercise at least twice a week. She was intermittent fasting for a long period of time previously and has now started to eat regularly again. Her anxiety has improved and she does not want any medication. She has constipation. She has been in pain due to inflammation in her gums. She had a R sided gingivectomy and has a personal history of gingivitis. She has seasonal allergies and has been experiencing R ear pain when she lies down and at night. She is UTD for an eye exam.  She is UTD for a mammogram.  Patient Active Problem List   Diagnosis Code    DDD (degenerative disc disease), lumbar M51.36    Pulmonary nodule, left R91.1    Diabetes mellitus type 2, controlled ([facility] E11.9    Benign essential hypertension I10    Hyperlipidemia with target LDL less than 100 E78.5    Obesity (BMI 30-39. 9) E66.9    Anxiety disorder F41.9    Status post lumbar surgery Z98.890    Hx of bilateral breast reduction surgery Z98.890    S/P hip replacement, right Z96.641        Current Outpatient Medications on File Prior to Visit   Medication Sig Dispense Refill    chlorthalidone (HYGROTON) 25 mg tablet TAKE 1 TABLET BY MOUTH EVERY DAY 30 Tablet 1    metFORMIN ER (GLUCOPHAGE XR) 500 mg tablet TAKE 1 TABLET BY MOUTH TWO (2) TIMES A DAY FOR 90 DAYS. 180 Tablet 0    Blood-Glucose Meter monitoring kit Check blood sugar 3 times a day 1 Kit 0    glucose blood VI test strips (BLOOD GLUCOSE TEST) strip Test as directed in clinic. Dx: Diabetes Mellitus 100 Strip 0    glucose blood VI test strips (ONETOUCH ULTRA BLUE TEST STRIP) strip Check three times a day. Fluctuating blood sugar 100 Strip 3    lancets misc Use as directed. Dx: E11.9 100 Each 1    aspirin delayed-release 81 mg tablet Take  by mouth daily. LUTEIN PO Take 25 mcg by mouth daily.       fish,bora,flax oils-om3,6,9no1 1,200 mg cap Take  by mouth.      loratadine (CLARITIN) 10 mg tablet Take 10 mg by mouth daily. B.animalis,bifid,infantis,long 10-15 mg TbEC Take  by mouth daily. ONETOUCH DELICA LANCETS 30 gauge misc       magnesium 250 mg Tab Take 400 mg by mouth daily. melatonin 3 mg tablet Take 3 mg by mouth nightly. senna-docusate (PERICOLACE) 8.6-50 mg per tablet Take 1 Tab by mouth two (2) times a day. (Patient not taking: Reported on 3/29/2023) 30 Tab 0    ascorbic acid/multivit-min (EMERGEN-C PO) Take 1,000 mg by mouth daily. (Patient not taking: No sig reported)      TURMERIC PO Take 500 mg by mouth daily. (Patient not taking: Reported on 3/29/2023)      potassium 99 mg tablet Take 99 mg by mouth every other day. (Patient not taking: No sig reported)      OTHER bromium 500 mg every morning (Patient not taking: No sig reported)      OTHER Berta daily (500 mg)      coenzyme q10 10 mg cap Take 100 mg by mouth daily. (Patient not taking: No sig reported)      zinc 50 mg tab tablet Take 50 mg by mouth daily. (Patient not taking: No sig reported)       No current facility-administered medications on file prior to visit. Allergies   Allergen Reactions    Ceclor [Cefaclor] Rash     Amilcar Trinidad    Sulfa (Sulfonamide Antibiotics) Anaphylaxis, Shortness of Breath and Rash    Invokana [Canagliflozin] Other (comments)     Alopecia and yeast     Micardis [Telmisartan] Myalgia     Leg muscle pain - subsided with discontinuation    Azithromycin Nausea Only     Pt states she is allergic to all mycin drugs; GI upset    Ciprofloxacin Other (comments)     Headache    Lexapro [Escitalopram] Other (comments)     Pt states she had arm pains and vision changes. Losartan Cough    Other Medication Other (comments)     DERMABOND GLUE, HAD BREAST REDUCTION AND WOUND ALMOST DEHISCED.     Tetracycline Other (comments)     Stomach issues      Zyrtec [Cetirizine] Other (comments)     Pt states that it makes her heart race       Past Medical History:   Diagnosis Date    Arthritis     degenerative & generalized    Chronic pain     right hip    Diabetes mellitus type 2, controlled ([address] 08/05/2015    Elevated liver enzymes     H/O bone density study 10/2012    normal    H/O seasonal allergies     Hypercalcemia 05/08/2017    Hypertension     resolved last in May    Nausea & vomiting     Other and unspecified hyperlipidemia 08/05/2015    Psychiatric disorder     anxiety no meds    Pulsatile tinnitus, left ear        Past Surgical History:   Procedure Laterality Date    HX ABDOMINOPLASTY  2003    HX BREAST REDUCTION Bilateral 11/21/2017    BILATERAL BREAST REDUCTION performed by Ketan Meadows MD at [facility]  [id number]    HX CYST REMOVAL Right 2008    ganglion - wrist    HX GI  11/2011    colonoscopy-normal-10 years-done in [address]    HX HEENT Right 2007    Lasik    HX ORTHOPAEDIC  06/21/2016    back surgery L3- L5 , screws and rods and plates    HX OTHER SURGICAL Right 1991    plantar wart of FOOT & 3th and 5th toe ? procedure    HX TONSILLECTOMY  1967       Family History   Problem Relation Age of Onset    Hypertension Mother     Diabetes Mother     OSTEOARTHRITIS Mother     Cancer Father         lung, ?brain    Other Paternal Aunt         ruptured brain aneurysm - HTN    Hypertension Sister         bilat THR    OSTEOARTHRITIS Sister     Coronary Art Dis Brother         stent    OSTEOARTHRITIS Brother     No Known Problems Brother     Lung Disease Brother     Other Maternal Grandmother         during childbirth    Hypertension Sister     Anesth Problems Neg Hx        Social History     Socioeconomic History    Marital status:      Spouse name: Single    Number of children: 2    Years of education: BA    Highest education level: Not on file   Occupational History    Occupation: NP     Comment: [address]   Tobacco Use    Smoking status: Never    Smokeless tobacco: Never   Vaping Use Vaping Use: Never used   Substance and Sexual Activity    Alcohol use: Yes     Alcohol/week: 0.0 standard drinks     Comment: very rare    Drug use: No    Sexual activity: Not Currently   Other Topics Concern    Not on file   Social History Narrative    ** Merged History Encounter **          Social Determinants of Health     Financial Resource Strain: Medium Risk    Difficulty of Paying Living Expenses: Somewhat hard   Food Insecurity: Food Insecurity Present    Worried About Running Out of Food in the Last Year: Sometimes true    Ran Out of Food in the Last Year: Never true   Transportation Needs: Not on file   Physical Activity: Not on file   Stress: Not on file   Social Connections: Not on file   Intimate Partner Violence: Not on file   Housing Stability: Not on file       No visits with results within 3 Month(s) from this visit. Latest known visit with results is:   Office Visit on 09/27/2022   Component Date Value Ref Range Status    Glucose 09/27/2022 105 (A)  70 - 99 mg/dL Final                  **Please note reference interval change**    BUN 09/27/2022 19  8 - 27 mg/dL Final    Creatinine 09/27/2022 0.68  0.57 - 1.00 mg/dL Final    eGFR 09/27/2022 97  >59 mL/min/1.73 Final    BUN/Creatinine ratio 09/27/2022 28  12 - 28 Final    Sodium 09/27/2022 144  134 - 144 mmol/L Final    Potassium 09/27/2022 4.4  3.5 - 5.2 mmol/L Final    Chloride 09/27/2022 103  96 - 106 mmol/L Final    CO2 09/27/2022 24  20 - 29 mmol/L Final    Calcium 09/27/2022 10.4 (A)  8.7 - 10.3 mg/dL Final    Protein, total 09/27/2022 7.5  6.0 - 8.5 g/dL Final    Albumin 09/27/2022 4.8  3.8 - 4.8 g/dL Final    GLOBULIN, TOTAL 09/27/2022 2.7  1.5 - 4.5 g/dL Final    A-G Ratio 09/27/2022 1.8  1.2 - 2.2 Final    Bilirubin, total 09/27/2022 0.3  0.0 - 1.2 mg/dL Final    Alk.  phosphatase 09/27/2022 64  44 - 121 IU/L Final    AST (SGOT) 09/27/2022 32  0 - 40 IU/L Final    ALT (SGPT) 09/27/2022 41 (A)  0 - 32 IU/L Final    WBC 09/27/2022 5.9 3.4 - 10.8 x10E3/uL Final    RBC 09/27/2022 5.55 (A)  3.77 - 5.28 x10E6/uL Final    HGB 09/27/2022 13.7  11.1 - 15.9 g/dL Final    HCT 09/27/2022 44.9  34.0 - 46.6 % Final    MCV 09/27/2022 81  79 - 97 fL Final    MCH 09/27/2022 24.7 (A)  26.6 - 33.0 pg Final    MCHC 09/27/2022 30.5 (A)  31.5 - 35.7 g/dL Final    RDW 09/27/2022 14.8  11.7 - 15.4 % Final    PLATELET 60/20/5698 329  150 - 450 x10E3/uL Final    Cholesterol, total 09/27/2022 226 (A)  100 - 199 mg/dL Final    Triglyceride 09/27/2022 93  0 - 149 mg/dL Final    HDL Cholesterol 09/27/2022 46  >39 mg/dL Final    VLDL, calculated 09/27/2022 17  5 - 40 mg/dL Final    LDL, calculated 09/27/2022 163 (A)  0 - 99 mg/dL Final    Creatinine, urine random 09/27/2022 148.6  Not Estab. mg/dL Final    Microalbumin, urine 09/27/2022 6.5  Not Estab. ug/mL Final    Microalb/Creat ratio (ug/mg creat.) 09/27/2022 4  0 - 29 mg/g creat Final    Comment:                        Normal:                0 -  29                         Moderately increased: 30 - 300                         Severely increased:       >300      Hemoglobin A1c 09/27/2022 9.7 (A)  4.8 - 5.6 % Final    Comment:          Prediabetes: 5.7 - 6.4           Diabetes: >6.4           Glycemic control for adults with diabetes: <7.0      Estimated average glucose 09/27/2022 232  mg/dL Final    TSH 09/27/2022 1.430  0.450 - 4.500 uIU/mL Final     Review of Systems   Constitutional:  Negative for activity change, fatigue and unexpected weight change. HENT:  Positive for ear pain. Negative for congestion, hearing loss, rhinorrhea and sore throat. Eyes:  Negative for discharge. Respiratory:  Negative for cough, chest tightness and shortness of breath. Cardiovascular:  Negative for leg swelling. Gastrointestinal:  Positive for constipation. Negative for abdominal pain and diarrhea. Genitourinary:  Negative for dysuria, flank pain, frequency and urgency.    Musculoskeletal:  Negative for arthralgias, back pain and myalgias. Skin:  Negative for color change and rash. Neurological:  Negative for dizziness, light-headedness and headaches. Psychiatric/Behavioral:  Negative for dysphoric mood and sleep disturbance. The patient is not nervous/anxious. Visit Vitals  /85 (BP 1 Location: Left upper arm, BP Patient Position: Sitting)   Pulse 65   Temp 97.5 °F (36.4 °C)   Resp 17   Ht 5' 3\" (1.6 m)   Wt 166 lb 12.8 oz (75.7 kg)   SpO2 100%   BMI 29.55 kg/m²       Physical Exam  Vitals and nursing note reviewed. Constitutional:       General: She is not in acute distress. Appearance: Normal appearance. She is well-developed. She is not diaphoretic. HENT:      Right Ear: External ear normal.      Left Ear: External ear normal.   Eyes:      General: No scleral icterus. Right eye: No discharge. Left eye: No discharge. Extraocular Movements: Extraocular movements intact. Conjunctiva/sclera: Conjunctivae normal.   Cardiovascular:      Rate and Rhythm: Normal rate and regular rhythm. Pulmonary:      Effort: Pulmonary effort is normal.      Breath sounds: Normal breath sounds. No wheezing. Abdominal:      General: Bowel sounds are normal.      Palpations: Abdomen is soft. Tenderness: There is no abdominal tenderness. Musculoskeletal:      Cervical back: Normal range of motion and neck supple. Lymphadenopathy:      Cervical: No cervical adenopathy. Neurological:      Mental Status: She is alert and oriented to person, place, and time. Psychiatric:         Mood and Affect: Mood and affect normal.         INelson MD, personally performed the services described in this documentation as scribed by Ricky Salas in my presence, and it is both accurate and complete. An electronic signature was used to authenticate this note.   -- Ricky Salas

## 2023-03-30 RX ORDER — METFORMIN HYDROCHLORIDE 500 MG/1
500 TABLET, EXTENDED RELEASE ORAL 2 TIMES DAILY
Qty: 180 TABLET | Refills: 0 | Status: SHIPPED | OUTPATIENT
Start: 2023-03-30 | End: 2023-06-28

## 2023-03-31 DIAGNOSIS — E78.2 MIXED HYPERLIPIDEMIA: Primary | ICD-10-CM

## 2023-03-31 RX ORDER — ATORVASTATIN CALCIUM 10 MG/1
10 TABLET, FILM COATED ORAL DAILY
Qty: 90 TABLET | Refills: 0 | Status: SHIPPED | OUTPATIENT
Start: 2023-03-31 | End: 2023-06-29

## 2023-04-05 RX ORDER — CHLORTHALIDONE 25 MG/1
TABLET ORAL
Qty: 30 TABLET | Refills: 1 | Status: SHIPPED
Start: 2023-04-05

## 2023-04-29 DIAGNOSIS — I10 PRIMARY HYPERTENSION: ICD-10-CM

## 2023-04-29 RX ORDER — CHLORTHALIDONE 25 MG/1
TABLET ORAL
Qty: 30 TABLET | Refills: 1 | Status: SHIPPED | OUTPATIENT
Start: 2023-04-29

## 2023-05-15 DIAGNOSIS — I10 BENIGN ESSENTIAL HYPERTENSION: Primary | ICD-10-CM

## 2023-05-15 RX ORDER — CHLORTHALIDONE 25 MG/1
25 TABLET ORAL DAILY
Qty: 90 TABLET | Refills: 0 | Status: SHIPPED | OUTPATIENT
Start: 2023-05-15 | End: 2023-08-13

## 2023-05-15 NOTE — TELEPHONE ENCOUNTER
Requested Prescriptions     Pending Prescriptions Disp Refills    chlorthalidone (HYGROTON) 25 MG tablet 90 tablet 0     Sig: Take 1 tablet by mouth daily        Last Visit 3/29/23  Last Refill 3/1/23

## 2023-06-28 DIAGNOSIS — E11.9 TYPE 2 DIABETES MELLITUS WITHOUT COMPLICATIONS (HCC): ICD-10-CM

## 2023-06-28 RX ORDER — METFORMIN HYDROCHLORIDE 500 MG/1
TABLET, EXTENDED RELEASE ORAL
Qty: 180 TABLET | Refills: 0 | Status: SHIPPED | OUTPATIENT
Start: 2023-06-28

## 2023-07-03 DIAGNOSIS — I10 BENIGN ESSENTIAL HYPERTENSION: ICD-10-CM

## 2023-07-04 RX ORDER — CHLORTHALIDONE 25 MG/1
TABLET ORAL
Qty: 90 TABLET | Refills: 0 | Status: SHIPPED | OUTPATIENT
Start: 2023-07-04

## 2023-08-03 ENCOUNTER — HOSPITAL ENCOUNTER (OUTPATIENT)
Facility: HOSPITAL | Age: 63
Discharge: HOME | End: 2023-08-03
Attending: ORTHOPAEDIC SURGERY
Payer: MEDICARE

## 2023-08-03 DIAGNOSIS — M16.12 PRIMARY OSTEOARTHRITIS OF LEFT HIP: ICD-10-CM

## 2023-08-03 PROCEDURE — 6360000002 HC RX W HCPCS: Performed by: ORTHOPAEDIC SURGERY

## 2023-08-03 PROCEDURE — 6360000004 HC RX CONTRAST MEDICATION: Performed by: ORTHOPAEDIC SURGERY

## 2023-08-03 PROCEDURE — 2500000003 HC RX 250 WO HCPCS: Performed by: ORTHOPAEDIC SURGERY

## 2023-08-03 PROCEDURE — 20605 DRAIN/INJ JOINT/BURSA W/O US: CPT

## 2023-08-03 RX ORDER — TRIAMCINOLONE ACETONIDE 40 MG/ML
40 INJECTION, SUSPENSION INTRA-ARTICULAR; INTRAMUSCULAR ONCE
Status: COMPLETED | OUTPATIENT
Start: 2023-08-03 | End: 2023-08-03

## 2023-08-03 RX ORDER — LIDOCAINE HYDROCHLORIDE 10 MG/ML
6 INJECTION, SOLUTION EPIDURAL; INFILTRATION; INTRACAUDAL; PERINEURAL ONCE
Status: COMPLETED | OUTPATIENT
Start: 2023-08-03 | End: 2023-08-03

## 2023-08-03 RX ORDER — LIDOCAINE HYDROCHLORIDE 10 MG/ML
5 INJECTION, SOLUTION EPIDURAL; INFILTRATION; INTRACAUDAL; PERINEURAL ONCE
Status: COMPLETED | OUTPATIENT
Start: 2023-08-03 | End: 2023-08-03

## 2023-08-03 RX ORDER — BUPIVACAINE HYDROCHLORIDE 7.5 MG/ML
30 INJECTION, SOLUTION EPIDURAL; RETROBULBAR ONCE
Status: COMPLETED | OUTPATIENT
Start: 2023-08-03 | End: 2023-08-03

## 2023-08-03 RX ADMIN — TRIAMCINOLONE ACETONIDE 40 MG: 40 INJECTION, SUSPENSION INTRA-ARTICULAR; INTRAMUSCULAR at 12:31

## 2023-08-03 RX ADMIN — BUPIVACAINE HYDROCHLORIDE 5 MG: 7.5 INJECTION, SOLUTION EPIDURAL; RETROBULBAR at 12:28

## 2023-08-03 RX ADMIN — LIDOCAINE HYDROCHLORIDE 5 ML: 10 INJECTION, SOLUTION EPIDURAL; INFILTRATION; INTRACAUDAL; PERINEURAL at 12:30

## 2023-08-03 RX ADMIN — IOHEXOL 10 ML: 180 INJECTION INTRAVENOUS at 12:27

## 2023-08-03 RX ADMIN — LIDOCAINE HYDROCHLORIDE 5 ML: 10 INJECTION, SOLUTION EPIDURAL; INFILTRATION; INTRACAUDAL; PERINEURAL at 12:31

## 2023-09-14 DIAGNOSIS — E11.9 TYPE 2 DIABETES MELLITUS WITHOUT COMPLICATIONS (HCC): ICD-10-CM

## 2023-09-14 RX ORDER — METFORMIN HYDROCHLORIDE 500 MG/1
TABLET, EXTENDED RELEASE ORAL
Qty: 180 TABLET | Refills: 0 | Status: SHIPPED | OUTPATIENT
Start: 2023-09-14

## 2023-12-04 ENCOUNTER — HOSPITAL ENCOUNTER (OUTPATIENT)
Facility: HOSPITAL | Age: 63
Discharge: HOME | End: 2023-12-07
Payer: MEDICARE

## 2023-12-04 VITALS
HEART RATE: 80 BPM | SYSTOLIC BLOOD PRESSURE: 152 MMHG | WEIGHT: 178 LBS | OXYGEN SATURATION: 97 % | TEMPERATURE: 97.7 F | BODY MASS INDEX: 31.54 KG/M2 | HEIGHT: 63 IN | DIASTOLIC BLOOD PRESSURE: 80 MMHG

## 2023-12-04 LAB
ABO + RH BLD: NORMAL
ANION GAP SERPL CALC-SCNC: 4 MMOL/L (ref 5–15)
APPEARANCE UR: CLEAR
BACTERIA URNS QL MICRO: NEGATIVE /HPF
BASOPHILS # BLD: 0 K/UL (ref 0–0.1)
BASOPHILS NFR BLD: 1 % (ref 0–1)
BILIRUB UR QL: NEGATIVE
BLOOD GROUP ANTIBODIES SERPL: NORMAL
BUN SERPL-MCNC: 19 MG/DL (ref 6–20)
BUN/CREAT SERPL: 27 (ref 12–20)
CALCIUM SERPL-MCNC: 9.1 MG/DL (ref 8.5–10.1)
CHLORIDE SERPL-SCNC: 108 MMOL/L (ref 97–108)
CO2 SERPL-SCNC: 27 MMOL/L (ref 21–32)
COLOR UR: NORMAL
CREAT SERPL-MCNC: 0.71 MG/DL (ref 0.55–1.02)
DIFFERENTIAL METHOD BLD: ABNORMAL
EKG ATRIAL RATE: 78 BPM
EKG DIAGNOSIS: NORMAL
EKG P AXIS: 48 DEGREES
EKG P-R INTERVAL: 172 MS
EKG Q-T INTERVAL: 362 MS
EKG QRS DURATION: 68 MS
EKG QTC CALCULATION (BAZETT): 412 MS
EKG R AXIS: 7 DEGREES
EKG T AXIS: 18 DEGREES
EKG VENTRICULAR RATE: 78 BPM
EOSINOPHIL # BLD: 0.3 K/UL (ref 0–0.4)
EOSINOPHIL NFR BLD: 5 % (ref 0–7)
EPITH CASTS URNS QL MICRO: NORMAL /LPF
ERYTHROCYTE [DISTWIDTH] IN BLOOD BY AUTOMATED COUNT: 15 % (ref 11.5–14.5)
EST. AVERAGE GLUCOSE BLD GHB EST-MCNC: 160 MG/DL
GLUCOSE SERPL-MCNC: 159 MG/DL (ref 65–100)
GLUCOSE UR STRIP.AUTO-MCNC: NEGATIVE MG/DL
HBA1C MFR BLD: 7.2 % (ref 4–5.6)
HCT VFR BLD AUTO: 38 % (ref 35–47)
HGB BLD-MCNC: 11.5 G/DL (ref 11.5–16)
HGB UR QL STRIP: NEGATIVE
HYALINE CASTS URNS QL MICRO: NORMAL /LPF (ref 0–5)
IMM GRANULOCYTES # BLD AUTO: 0 K/UL (ref 0–0.04)
IMM GRANULOCYTES NFR BLD AUTO: 1 % (ref 0–0.5)
INR PPP: 1 (ref 0.9–1.1)
KETONES UR QL STRIP.AUTO: NEGATIVE MG/DL
LEUKOCYTE ESTERASE UR QL STRIP.AUTO: NEGATIVE
LYMPHOCYTES # BLD: 1.8 K/UL (ref 0.8–3.5)
LYMPHOCYTES NFR BLD: 33 % (ref 12–49)
MCH RBC QN AUTO: 24.5 PG (ref 26–34)
MCHC RBC AUTO-ENTMCNC: 30.3 G/DL (ref 30–36.5)
MCV RBC AUTO: 80.9 FL (ref 80–99)
MONOCYTES # BLD: 0.6 K/UL (ref 0–1)
MONOCYTES NFR BLD: 12 % (ref 5–13)
NEUTS SEG # BLD: 2.7 K/UL (ref 1.8–8)
NEUTS SEG NFR BLD: 48 % (ref 32–75)
NITRITE UR QL STRIP.AUTO: NEGATIVE
NRBC # BLD: 0 K/UL (ref 0–0.01)
NRBC BLD-RTO: 0 PER 100 WBC
PH UR STRIP: 6 (ref 5–8)
PLATELET # BLD AUTO: 293 K/UL (ref 150–400)
PMV BLD AUTO: 10.5 FL (ref 8.9–12.9)
POTASSIUM SERPL-SCNC: 4.8 MMOL/L (ref 3.5–5.1)
PROT UR STRIP-MCNC: NEGATIVE MG/DL
PROTHROMBIN TIME: 10.7 SEC (ref 9–11.1)
RBC # BLD AUTO: 4.7 M/UL (ref 3.8–5.2)
RBC #/AREA URNS HPF: NORMAL /HPF (ref 0–5)
SODIUM SERPL-SCNC: 139 MMOL/L (ref 136–145)
SP GR UR REFRACTOMETRY: 1.02 (ref 1–1.03)
SPECIMEN EXP DATE BLD: NORMAL
URINE CULTURE IF INDICATED: NORMAL
UROBILINOGEN UR QL STRIP.AUTO: 0.2 EU/DL (ref 0.2–1)
WBC # BLD AUTO: 5.5 K/UL (ref 3.6–11)
WBC URNS QL MICRO: NORMAL /HPF (ref 0–4)

## 2023-12-04 PROCEDURE — 36415 COLL VENOUS BLD VENIPUNCTURE: CPT

## 2023-12-04 PROCEDURE — 86850 RBC ANTIBODY SCREEN: CPT

## 2023-12-04 PROCEDURE — 93010 ELECTROCARDIOGRAM REPORT: CPT | Performed by: SPECIALIST

## 2023-12-04 PROCEDURE — 81001 URINALYSIS AUTO W/SCOPE: CPT

## 2023-12-04 PROCEDURE — 83036 HEMOGLOBIN GLYCOSYLATED A1C: CPT

## 2023-12-04 PROCEDURE — 80048 BASIC METABOLIC PNL TOTAL CA: CPT

## 2023-12-04 PROCEDURE — 93005 ELECTROCARDIOGRAM TRACING: CPT | Performed by: ORTHOPAEDIC SURGERY

## 2023-12-04 PROCEDURE — 85025 COMPLETE CBC W/AUTO DIFF WBC: CPT

## 2023-12-04 PROCEDURE — 85610 PROTHROMBIN TIME: CPT

## 2023-12-04 PROCEDURE — APPNB30 APP NON BILLABLE TIME 0-30 MINS: Performed by: NURSE PRACTITIONER

## 2023-12-04 RX ORDER — ATORVASTATIN CALCIUM 10 MG/1
10 TABLET, FILM COATED ORAL AS NEEDED
COMMUNITY

## 2023-12-04 RX ORDER — ACETAMINOPHEN 500 MG
500 TABLET ORAL AS NEEDED
COMMUNITY

## 2023-12-04 RX ORDER — ACETAMINOPHEN 160 MG
TABLET,DISINTEGRATING ORAL DAILY
COMMUNITY

## 2023-12-04 RX ORDER — PERPHENAZINE 16 MG
TABLET ORAL DAILY
COMMUNITY

## 2023-12-04 RX ORDER — THIAMINE MONONITRATE (VIT B1) 100 MG
100 TABLET ORAL DAILY
COMMUNITY
End: 2023-12-04 | Stop reason: ALTCHOICE

## 2023-12-04 RX ORDER — COVID-19 ANTIGEN TEST
KIT MISCELLANEOUS AS NEEDED
COMMUNITY
End: 2023-12-06 | Stop reason: ALTCHOICE

## 2023-12-04 RX ORDER — IBUPROFEN 200 MG
200 TABLET ORAL AS NEEDED
COMMUNITY

## 2023-12-04 NOTE — PERIOP NOTE
[id number] [facility] INSTRUCTIONS  ORTHOPAEDIC    Surgery Date:   12/12/23    Your surgeon's office or [facility] staff will call you between 4 PM- 8 PM the day before surgery with your arrival time. If your surgery is on a Monday, you will receive a call the preceding Friday. If your surgeon's office has given you, your arrival time then go by that time. Please report to [facility] Patient Access/Admitting on the 1st floor. Bring your insurance card, photo identification, and any copayment (if applicable). If you are going home the same day of your surgery, you must have a responsible adult to drive you home. You need to have a responsible adult to stay with you the first 24 hours after surgery and you should not drive a car for 24 hours following your surgery. If you are being admitted to the hospital,please leave personal belongings/luggage in your car until you have an assigned hospital room number. Please wear comfortable clothes. Wear your glasses instead of contacts. We ask that all money, jewelry and valuables be left at home. Wear no make up, particularly mascara, the day of surgery. Do NOT drink alcohol or smoke 24 hours before surgery. STOP smoking for 14 days prior as it helps with breathing and healing after surgery. All body piercings, rings, and jewelry need to be removed and left at home. Do not wear any fingernail polish except for clear. Please wear your hair loose or down. Please no pony-tails, buns, or any metal hair accessories. You may wear deodorant. Do not shave any body area within 24 hours of your surgery. Please follow all instructions to avoid any potential surgical cancellation. Should your physical condition change, (i.e. fever, cold, flu, etc.) please notify your surgeon as soon as possible. It is important to be on time. If a situation occurs where you may be delayed, please call:  [phone] / [phone] on the day of surgery.   The Preadmission

## 2023-12-05 LAB
BACTERIA SPEC CULT: NORMAL
BACTERIA SPEC CULT: NORMAL
SERVICE CMNT-IMP: NORMAL

## 2023-12-05 NOTE — PROGRESS NOTES
Carrie Horvath) was seen at [address] [place] [address] on 12/4/23. They have surgery scheduled with Dr. Lindy Pichardo on 12/12/23. The results of their ARNOL STOP-BANG Scoring Tool indicates the potential need for a referral to sleep medicine. Results forwarded to PCP for follow-up/further recommendations regarding evaluation for sleep apnea. ARNOL STOP-BANG Scoring Tool    [x] Does the patient snore loud enough to be heard through a closed door? [] Does the patient often feel tired, fatigued or sleep during the daytime or after a \"good\" night's sleep? [] Has anyone observed the patient stop breathing during their sleep? [x] Does the patient have or are they treated for HTN? [] Is the patient's BMI >35? [x] Is the patient's neck size >17\"(male) or >16\"(female)? [x] Is the patient older than 48?  [] Is the patient male?     ARNOL Risk Score: 4    EFRAIN Tinajero - NP
Granulocytes 12/04/2023 1 (H)  0.0 - 0.5 % Final    Neutrophils Absolute 12/04/2023 2.7  1.8 - 8.0 K/UL Final    Lymphocytes Absolute 12/04/2023 1.8  0.8 - 3.5 K/UL Final    Monocytes Absolute 12/04/2023 0.6  0.0 - 1.0 K/UL Final    Eosinophils Absolute 12/04/2023 0.3  0.0 - 0.4 K/UL Final    Basophils Absolute 12/04/2023 0.0  0.0 - 0.1 K/UL Final    Absolute Immature Granulocyte 12/04/2023 0.0  0.00 - 0.04 K/UL Final    Differential Type 12/04/2023 AUTOMATED    Final    Special Requests 12/04/2023 NO SPECIAL REQUESTS    Final    Culture 12/04/2023 MRSA NOT PRESENT    Final    Culture 12/04/2023     Final                    Value:Screening of patient nares for MRSA is for surveillance purposes and, if positive, to facilitate isolation considerations in high risk settings. It is not intended for automatic decolonization interventions per se as regimens are not sufficiently effective to warrant routine use.       Ventricular Rate 12/04/2023 78  BPM Final    Atrial Rate 12/04/2023 78  BPM Final    P-R Interval 12/04/2023 172  ms Final    QRS Duration 12/04/2023 68  ms Final    Q-T Interval 12/04/2023 362  ms Final    QTc Calculation (Bazett) 12/04/2023 412  ms Final    P Axis 12/04/2023 48  degrees Final    R Axis 12/04/2023 7  degrees Final    T Axis 12/04/2023 18  degrees Final    Diagnosis 12/04/2023    Final                    Value:Normal sinus rhythm  Minimal voltage criteria for LVH, may be normal variant ( R in aVL )  Borderline ECG  When compared with ECG of 28-SEP-2018 10:36,  No significant change was found  Confirmed by Urbano Barlow M.D., Darwin Solomon ([id number]) on 12/4/2023 4:20:18 PM      Hemoglobin A1C 12/04/2023 7.2 (H)  4.0 - 5.6 % Final    Comment: (NOTE)  HbA1C Interpretive Ranges  <5.7              Normal  5.7 - 6.4         Consider Prediabetes  >6.5              Consider Diabetes      eAG 12/04/2023 160  mg/dL Final    INR 12/04/2023 1.0  0.9 - 1.1   Final    A single therapeutic range for Vit K antagonists may

## 2023-12-06 ENCOUNTER — OFFICE VISIT (OUTPATIENT)
Dept: PRIMARY CARE CLINIC | Facility: CLINIC | Age: 63
End: 2023-12-06
Payer: MEDICARE

## 2023-12-06 VITALS
WEIGHT: 177 LBS | TEMPERATURE: 97.5 F | OXYGEN SATURATION: 100 % | HEIGHT: 63 IN | RESPIRATION RATE: 16 BRPM | BODY MASS INDEX: 31.36 KG/M2 | HEART RATE: 90 BPM | DIASTOLIC BLOOD PRESSURE: 81 MMHG | SYSTOLIC BLOOD PRESSURE: 142 MMHG

## 2023-12-06 DIAGNOSIS — I10 PRIMARY HYPERTENSION: ICD-10-CM

## 2023-12-06 DIAGNOSIS — R00.0 TACHYCARDIA: ICD-10-CM

## 2023-12-06 DIAGNOSIS — M16.10 HIP ARTHRITIS: Primary | ICD-10-CM

## 2023-12-06 DIAGNOSIS — E11.9 TYPE 2 DIABETES MELLITUS WITHOUT COMPLICATION, WITHOUT LONG-TERM CURRENT USE OF INSULIN (HCC): ICD-10-CM

## 2023-12-06 DIAGNOSIS — Z01.818 PRE-OP EXAM: ICD-10-CM

## 2023-12-06 PROCEDURE — G8427 DOCREV CUR MEDS BY ELIG CLIN: HCPCS | Performed by: INTERNAL MEDICINE

## 2023-12-06 PROCEDURE — 1090F PRES/ABSN URINE INCON ASSESS: CPT | Performed by: INTERNAL MEDICINE

## 2023-12-06 PROCEDURE — 3079F DIAST BP 80-89 MM HG: CPT | Performed by: INTERNAL MEDICINE

## 2023-12-06 PROCEDURE — 3051F HG A1C>EQUAL 7.0%<8.0%: CPT | Performed by: INTERNAL MEDICINE

## 2023-12-06 PROCEDURE — 1123F ACP DISCUSS/DSCN MKR DOCD: CPT | Performed by: INTERNAL MEDICINE

## 2023-12-06 PROCEDURE — 99214 OFFICE O/P EST MOD 30 MIN: CPT | Performed by: INTERNAL MEDICINE

## 2023-12-06 PROCEDURE — G8484 FLU IMMUNIZE NO ADMIN: HCPCS | Performed by: INTERNAL MEDICINE

## 2023-12-06 PROCEDURE — 2022F DILAT RTA XM EVC RTNOPTHY: CPT | Performed by: INTERNAL MEDICINE

## 2023-12-06 PROCEDURE — 1036F TOBACCO NON-USER: CPT | Performed by: INTERNAL MEDICINE

## 2023-12-06 PROCEDURE — G8399 PT W/DXA RESULTS DOCUMENT: HCPCS | Performed by: INTERNAL MEDICINE

## 2023-12-06 PROCEDURE — 3077F SYST BP >= 140 MM HG: CPT | Performed by: INTERNAL MEDICINE

## 2023-12-06 PROCEDURE — G8417 CALC BMI ABV UP PARAM F/U: HCPCS | Performed by: INTERNAL MEDICINE

## 2023-12-06 PROCEDURE — 3017F COLORECTAL CA SCREEN DOC REV: CPT | Performed by: INTERNAL MEDICINE

## 2023-12-06 RX ORDER — NEBIVOLOL 5 MG/1
5 TABLET ORAL DAILY
Qty: 30 TABLET | Refills: 2 | Status: SHIPPED | OUTPATIENT
Start: 2023-12-06 | End: 2024-03-05

## 2023-12-06 RX ORDER — METHOCARBAMOL 500 MG/1
500 TABLET, FILM COATED ORAL 2 TIMES DAILY
Qty: 20 TABLET | Refills: 0 | Status: SHIPPED | OUTPATIENT
Start: 2023-12-06 | End: 2023-12-16

## 2023-12-06 RX ORDER — CHLORTHALIDONE 25 MG/1
25 TABLET ORAL DAILY
Qty: 90 TABLET | Refills: 0 | Status: CANCELLED | OUTPATIENT
Start: 2023-12-06

## 2023-12-06 RX ORDER — VITAMIN B COMPLEX
1 CAPSULE ORAL DAILY
COMMUNITY

## 2023-12-06 SDOH — ECONOMIC STABILITY: INCOME INSECURITY: HOW HARD IS IT FOR YOU TO PAY FOR THE VERY BASICS LIKE FOOD, HOUSING, MEDICAL CARE, AND HEATING?: PATIENT DECLINED

## 2023-12-06 SDOH — ECONOMIC STABILITY: HOUSING INSECURITY
IN THE LAST 12 MONTHS, WAS THERE A TIME WHEN YOU DID NOT HAVE A STEADY PLACE TO SLEEP OR SLEPT IN A SHELTER (INCLUDING NOW)?: PATIENT REFUSED

## 2023-12-06 SDOH — ECONOMIC STABILITY: FOOD INSECURITY: WITHIN THE PAST 12 MONTHS, THE FOOD YOU BOUGHT JUST DIDN'T LAST AND YOU DIDN'T HAVE MONEY TO GET MORE.: PATIENT DECLINED

## 2023-12-06 SDOH — ECONOMIC STABILITY: FOOD INSECURITY: WITHIN THE PAST 12 MONTHS, YOU WORRIED THAT YOUR FOOD WOULD RUN OUT BEFORE YOU GOT MONEY TO BUY MORE.: PATIENT DECLINED

## 2023-12-06 ASSESSMENT — PATIENT HEALTH QUESTIONNAIRE - PHQ9
2. FEELING DOWN, DEPRESSED OR HOPELESS: 0
SUM OF ALL RESPONSES TO PHQ QUESTIONS 1-9: 0
SUM OF ALL RESPONSES TO PHQ QUESTIONS 1-9: 0
1. LITTLE INTEREST OR PLEASURE IN DOING THINGS: 0
SUM OF ALL RESPONSES TO PHQ9 QUESTIONS 1 & 2: 0
SUM OF ALL RESPONSES TO PHQ QUESTIONS 1-9: 0
SUM OF ALL RESPONSES TO PHQ QUESTIONS 1-9: 0

## 2023-12-08 ENCOUNTER — TELEPHONE (OUTPATIENT)
Dept: PRIMARY CARE CLINIC | Facility: CLINIC | Age: 63
End: 2023-12-08

## 2023-12-08 DIAGNOSIS — I10 BENIGN ESSENTIAL HYPERTENSION: ICD-10-CM

## 2023-12-08 DIAGNOSIS — I10 PRIMARY HYPERTENSION: Primary | ICD-10-CM

## 2023-12-08 RX ORDER — ATENOLOL 25 MG/1
25 TABLET ORAL DAILY
Qty: 30 TABLET | Refills: 3 | Status: SHIPPED | OUTPATIENT
Start: 2023-12-08

## 2023-12-08 RX ORDER — CHLORTHALIDONE 25 MG/1
25 TABLET ORAL DAILY
Qty: 30 TABLET | Refills: 2 | Status: SHIPPED | OUTPATIENT
Start: 2023-12-08

## 2023-12-08 NOTE — TELEPHONE ENCOUNTER
Pt said bystolic was 146 dollars, so she wanted atenolol 25 mg daily. Also wanted refill on chlorthalidone . Both meds sent to pharmacy.

## 2023-12-09 NOTE — H&P
Lesly House (: 1960) is a 70 y.o. female, patient, here for evaluation of the following chief complaint(s):  Hip Pain (Left x 5 years, worse in last 2 months)        HPI:     Placement. Chief complaint is left hip pain. Allergies   Allergen Reactions    Cefaclor Rash       Skyler's Brain    Sulfa Antibiotics Anaphylaxis, Rash and Shortness Of Breath    Canagliflozin Other (See Comments)       Alopecia and yeast     Telmisartan Myalgia       Leg muscle pain - subsided with discontinuation    Azithromycin Nausea Only       Pt states she is allergic to all mycin drugs; GI upset    Cetirizine Other (See Comments)       Pt states that it makes her heart race    Ciprofloxacin Other (See Comments)       Headache    Escitalopram Other (See Comments)       Pt states she had arm pains and vision changes. Losartan Cough    Tetracycline Other (See Comments)       Stomach issues         Current Facility-Administered Medications          Current Outpatient Medications   Medication Sig Dispense Refill    chlorthalidone (HYGROTON) 25 MG tablet TAKE 1 TABLET BY MOUTH EVERY DAY 90 tablet 0    metFORMIN (GLUCOPHAGE-XR) 500 MG extended release tablet TAKE 1 TABLET BY MOUTH TWO (2) TIMES A DAY FOR 90 DAYS. 180 tablet 0    Lancets MISC Use as directed. Dx: E11.9        LUTEIN PO Take 25 mcg by mouth daily        TURMERIC PO Take 500 mg by mouth daily        aspirin 81 MG EC tablet Take by mouth daily        Coenzyme Q10 10 MG CAPS Take 100 mg by mouth daily        loratadine (CLARITIN) 10 MG tablet Take 1 tablet by mouth daily        melatonin 3 MG TABS tablet Take 1 tablet by mouth nightly        potassium gluconate 550 mg tablet Take 99 mg by mouth every other day        senna-docusate (PERICOLACE) 8.6-50 MG per tablet Take 1 tablet by mouth 2 times daily          No current facility-administered medications for this visit.             Past Medical History        Past Medical History:   Diagnosis Date

## 2023-12-11 ENCOUNTER — ANESTHESIA EVENT (OUTPATIENT)
Facility: HOSPITAL | Age: 63
End: 2023-12-11
Payer: MEDICARE

## 2023-12-12 ENCOUNTER — HOSPITAL ENCOUNTER (OUTPATIENT)
Facility: HOSPITAL | Age: 63
Setting detail: OBSERVATION
Discharge: HOME HEALTH | End: 2023-12-14
Attending: ORTHOPAEDIC SURGERY | Admitting: ORTHOPAEDIC SURGERY
Payer: MEDICARE

## 2023-12-12 ENCOUNTER — APPOINTMENT (OUTPATIENT)
Facility: HOSPITAL | Age: 63
End: 2023-12-12
Attending: ORTHOPAEDIC SURGERY
Payer: MEDICARE

## 2023-12-12 ENCOUNTER — ANESTHESIA (OUTPATIENT)
Facility: HOSPITAL | Age: 63
End: 2023-12-12
Payer: MEDICARE

## 2023-12-12 DIAGNOSIS — M16.12 ARTHRITIS OF LEFT HIP: Primary | ICD-10-CM

## 2023-12-12 DIAGNOSIS — Z96.642 S/P TOTAL LEFT HIP ARTHROPLASTY: ICD-10-CM

## 2023-12-12 LAB
GLUCOSE BLD STRIP.AUTO-MCNC: 125 MG/DL (ref 65–117)
GLUCOSE BLD STRIP.AUTO-MCNC: 133 MG/DL (ref 65–117)
GLUCOSE BLD STRIP.AUTO-MCNC: 202 MG/DL (ref 65–117)
SERVICE CMNT-IMP: ABNORMAL

## 2023-12-12 PROCEDURE — 2580000003 HC RX 258: Performed by: PHYSICIAN ASSISTANT

## 2023-12-12 PROCEDURE — 97530 THERAPEUTIC ACTIVITIES: CPT

## 2023-12-12 PROCEDURE — 3700000000 HC ANESTHESIA ATTENDED CARE: Performed by: ORTHOPAEDIC SURGERY

## 2023-12-12 PROCEDURE — 6360000002 HC RX W HCPCS: Performed by: PHYSICIAN ASSISTANT

## 2023-12-12 PROCEDURE — 7100000000 HC PACU RECOVERY - FIRST 15 MIN: Performed by: ORTHOPAEDIC SURGERY

## 2023-12-12 PROCEDURE — 6370000000 HC RX 637 (ALT 250 FOR IP): Performed by: PHYSICIAN ASSISTANT

## 2023-12-12 PROCEDURE — 6370000000 HC RX 637 (ALT 250 FOR IP): Performed by: ORTHOPAEDIC SURGERY

## 2023-12-12 PROCEDURE — 3700000001 HC ADD 15 MINUTES (ANESTHESIA): Performed by: ORTHOPAEDIC SURGERY

## 2023-12-12 PROCEDURE — 97161 PT EVAL LOW COMPLEX 20 MIN: CPT

## 2023-12-12 PROCEDURE — 2500000003 HC RX 250 WO HCPCS: Performed by: PHYSICIAN ASSISTANT

## 2023-12-12 PROCEDURE — 2709999900 HC NON-CHARGEABLE SUPPLY: Performed by: ORTHOPAEDIC SURGERY

## 2023-12-12 PROCEDURE — G0378 HOSPITAL OBSERVATION PER HR: HCPCS

## 2023-12-12 PROCEDURE — 3600000015 HC SURGERY LEVEL 5 ADDTL 15MIN: Performed by: ORTHOPAEDIC SURGERY

## 2023-12-12 PROCEDURE — 2580000003 HC RX 258: Performed by: STUDENT IN AN ORGANIZED HEALTH CARE EDUCATION/TRAINING PROGRAM

## 2023-12-12 PROCEDURE — 6360000002 HC RX W HCPCS: Performed by: NURSE ANESTHETIST, CERTIFIED REGISTERED

## 2023-12-12 PROCEDURE — 7100000001 HC PACU RECOVERY - ADDTL 15 MIN: Performed by: ORTHOPAEDIC SURGERY

## 2023-12-12 PROCEDURE — 6360000002 HC RX W HCPCS: Performed by: ORTHOPAEDIC SURGERY

## 2023-12-12 PROCEDURE — 3600000005 HC SURGERY LEVEL 5 BASE: Performed by: ORTHOPAEDIC SURGERY

## 2023-12-12 PROCEDURE — C1776 JOINT DEVICE (IMPLANTABLE): HCPCS | Performed by: ORTHOPAEDIC SURGERY

## 2023-12-12 PROCEDURE — 6360000002 HC RX W HCPCS: Performed by: STUDENT IN AN ORGANIZED HEALTH CARE EDUCATION/TRAINING PROGRAM

## 2023-12-12 PROCEDURE — 6370000000 HC RX 637 (ALT 250 FOR IP): Performed by: STUDENT IN AN ORGANIZED HEALTH CARE EDUCATION/TRAINING PROGRAM

## 2023-12-12 PROCEDURE — 2580000003 HC RX 258: Performed by: NURSE ANESTHETIST, CERTIFIED REGISTERED

## 2023-12-12 PROCEDURE — 97116 GAIT TRAINING THERAPY: CPT

## 2023-12-12 PROCEDURE — 2580000003 HC RX 258: Performed by: ORTHOPAEDIC SURGERY

## 2023-12-12 PROCEDURE — 82962 GLUCOSE BLOOD TEST: CPT

## 2023-12-12 DEVICE — BIOLOX DELTA CERAMIC FEMORAL HEAD 32MM DIA +9 12/14 TAPER
Type: IMPLANTABLE DEVICE | Site: HIP | Status: FUNCTIONAL
Brand: BIOLOX DELTA

## 2023-12-12 DEVICE — PINNACLE CANCELLOUS BONE SCREW 6.5MM X 25MM
Type: IMPLANTABLE DEVICE | Site: HIP | Status: FUNCTIONAL
Brand: PINNACLE

## 2023-12-12 DEVICE — APEX HOLE ELIMINATOR - PS
Type: IMPLANTABLE DEVICE | Site: HIP | Status: FUNCTIONAL
Brand: APEX

## 2023-12-12 DEVICE — HIP H2 TOT ADV OTHER HD IMPL CAPPED SYNTHES: Type: IMPLANTABLE DEVICE | Site: HIP | Status: FUNCTIONAL

## 2023-12-12 DEVICE — PINNACLE GRIPTION ACETABULAR SHELL SECTOR 48MM OD
Type: IMPLANTABLE DEVICE | Site: HIP | Status: FUNCTIONAL
Brand: PINNACLE GRIPTION

## 2023-12-12 DEVICE — PINNACLE CANCELLOUS BONE SCREW 6.5MM X 15MM
Type: IMPLANTABLE DEVICE | Site: HIP | Status: FUNCTIONAL
Brand: PINNACLE

## 2023-12-12 DEVICE — PINNACLE HIP SOLUTIONS ALTRX POLYETHYLENE ACETABULAR LINER NEUTRAL 32MM ID 48MM OD
Type: IMPLANTABLE DEVICE | Site: HIP | Status: FUNCTIONAL
Brand: PINNACLE ALTRX

## 2023-12-12 DEVICE — ACTIS DUOFIX HIP PROSTHESIS (FEMORAL STEM 12/14 TAPER CEMENTLESS SIZE 2 STD COLLAR)  CE
Type: IMPLANTABLE DEVICE | Site: HIP | Status: FUNCTIONAL
Brand: ACTIS

## 2023-12-12 RX ORDER — SODIUM CHLORIDE 9 MG/ML
INJECTION, SOLUTION INTRAVENOUS PRN
Status: DISCONTINUED | OUTPATIENT
Start: 2023-12-12 | End: 2023-12-12 | Stop reason: HOSPADM

## 2023-12-12 RX ORDER — TRAMADOL HYDROCHLORIDE 50 MG/1
50 TABLET ORAL EVERY 4 HOURS PRN
Status: DISCONTINUED | OUTPATIENT
Start: 2023-12-12 | End: 2023-12-14 | Stop reason: HOSPADM

## 2023-12-12 RX ORDER — HYDROMORPHONE HYDROCHLORIDE 1 MG/ML
0.5 INJECTION, SOLUTION INTRAMUSCULAR; INTRAVENOUS; SUBCUTANEOUS
Status: DISCONTINUED | OUTPATIENT
Start: 2023-12-12 | End: 2023-12-14 | Stop reason: HOSPADM

## 2023-12-12 RX ORDER — 0.9 % SODIUM CHLORIDE 0.9 %
500 INTRAVENOUS SOLUTION INTRAVENOUS ONCE
Status: DISCONTINUED | OUTPATIENT
Start: 2023-12-12 | End: 2023-12-14 | Stop reason: HOSPADM

## 2023-12-12 RX ORDER — ATENOLOL 50 MG/1
25 TABLET ORAL NIGHTLY
Status: DISCONTINUED | OUTPATIENT
Start: 2023-12-12 | End: 2023-12-14 | Stop reason: HOSPADM

## 2023-12-12 RX ORDER — ATENOLOL 50 MG/1
25 TABLET ORAL DAILY
Status: DISCONTINUED | OUTPATIENT
Start: 2023-12-12 | End: 2023-12-12

## 2023-12-12 RX ORDER — DEXAMETHASONE SODIUM PHOSPHATE 4 MG/ML
INJECTION, SOLUTION INTRA-ARTICULAR; INTRALESIONAL; INTRAMUSCULAR; INTRAVENOUS; SOFT TISSUE PRN
Status: DISCONTINUED | OUTPATIENT
Start: 2023-12-12 | End: 2023-12-12 | Stop reason: SDUPTHER

## 2023-12-12 RX ORDER — ONDANSETRON 4 MG/1
4 TABLET, ORALLY DISINTEGRATING ORAL EVERY 8 HOURS PRN
Status: DISCONTINUED | OUTPATIENT
Start: 2023-12-12 | End: 2023-12-14 | Stop reason: HOSPADM

## 2023-12-12 RX ORDER — ASPIRIN 325 MG
325 TABLET, DELAYED RELEASE (ENTERIC COATED) ORAL 2 TIMES DAILY
Status: DISCONTINUED | OUTPATIENT
Start: 2023-12-12 | End: 2023-12-14 | Stop reason: HOSPADM

## 2023-12-12 RX ORDER — BISACODYL 5 MG/1
5 TABLET, DELAYED RELEASE ORAL DAILY
Status: DISCONTINUED | OUTPATIENT
Start: 2023-12-12 | End: 2023-12-14 | Stop reason: HOSPADM

## 2023-12-12 RX ORDER — SODIUM CHLORIDE 9 MG/ML
INJECTION, SOLUTION INTRAVENOUS PRN
Status: DISCONTINUED | OUTPATIENT
Start: 2023-12-12 | End: 2023-12-14 | Stop reason: HOSPADM

## 2023-12-12 RX ORDER — SODIUM CHLORIDE, SODIUM LACTATE, POTASSIUM CHLORIDE, CALCIUM CHLORIDE 600; 310; 30; 20 MG/100ML; MG/100ML; MG/100ML; MG/100ML
INJECTION, SOLUTION INTRAVENOUS CONTINUOUS
Status: DISCONTINUED | OUTPATIENT
Start: 2023-12-12 | End: 2023-12-12 | Stop reason: HOSPADM

## 2023-12-12 RX ORDER — KETOROLAC TROMETHAMINE 30 MG/ML
15 INJECTION, SOLUTION INTRAMUSCULAR; INTRAVENOUS EVERY 6 HOURS
Status: COMPLETED | OUTPATIENT
Start: 2023-12-12 | End: 2023-12-14

## 2023-12-12 RX ORDER — SENNA AND DOCUSATE SODIUM 50; 8.6 MG/1; MG/1
1 TABLET, FILM COATED ORAL 2 TIMES DAILY
Status: DISCONTINUED | OUTPATIENT
Start: 2023-12-12 | End: 2023-12-14 | Stop reason: HOSPADM

## 2023-12-12 RX ORDER — ONDANSETRON 2 MG/ML
INJECTION INTRAMUSCULAR; INTRAVENOUS PRN
Status: DISCONTINUED | OUTPATIENT
Start: 2023-12-12 | End: 2023-12-12 | Stop reason: SDUPTHER

## 2023-12-12 RX ORDER — CHLORTHALIDONE 25 MG/1
25 TABLET ORAL DAILY
Status: DISCONTINUED | OUTPATIENT
Start: 2023-12-12 | End: 2023-12-14 | Stop reason: HOSPADM

## 2023-12-12 RX ORDER — SODIUM CHLORIDE 0.9 % (FLUSH) 0.9 %
5-40 SYRINGE (ML) INJECTION PRN
Status: DISCONTINUED | OUTPATIENT
Start: 2023-12-12 | End: 2023-12-12 | Stop reason: HOSPADM

## 2023-12-12 RX ORDER — SODIUM CHLORIDE 0.9 % (FLUSH) 0.9 %
5-40 SYRINGE (ML) INJECTION EVERY 12 HOURS SCHEDULED
Status: DISCONTINUED | OUTPATIENT
Start: 2023-12-12 | End: 2023-12-14 | Stop reason: HOSPADM

## 2023-12-12 RX ORDER — SODIUM CHLORIDE 0.9 % (FLUSH) 0.9 %
5-40 SYRINGE (ML) INJECTION EVERY 12 HOURS SCHEDULED
Status: DISCONTINUED | OUTPATIENT
Start: 2023-12-12 | End: 2023-12-12 | Stop reason: HOSPADM

## 2023-12-12 RX ORDER — SODIUM CHLORIDE 9 MG/ML
INJECTION, SOLUTION INTRAVENOUS CONTINUOUS
Status: DISCONTINUED | OUTPATIENT
Start: 2023-12-12 | End: 2023-12-14 | Stop reason: HOSPADM

## 2023-12-12 RX ORDER — METHOCARBAMOL 500 MG/1
500 TABLET, FILM COATED ORAL 2 TIMES DAILY
Status: DISCONTINUED | OUTPATIENT
Start: 2023-12-12 | End: 2023-12-14 | Stop reason: HOSPADM

## 2023-12-12 RX ORDER — FENTANYL CITRATE 50 UG/ML
25 INJECTION, SOLUTION INTRAMUSCULAR; INTRAVENOUS EVERY 5 MIN PRN
Status: DISCONTINUED | OUTPATIENT
Start: 2023-12-12 | End: 2023-12-12 | Stop reason: HOSPADM

## 2023-12-12 RX ORDER — OXYCODONE HYDROCHLORIDE 5 MG/1
5 TABLET ORAL
Status: DISCONTINUED | OUTPATIENT
Start: 2023-12-12 | End: 2023-12-12 | Stop reason: HOSPADM

## 2023-12-12 RX ORDER — DIPHENHYDRAMINE HYDROCHLORIDE 50 MG/ML
25 INJECTION INTRAMUSCULAR; INTRAVENOUS EVERY 6 HOURS PRN
Status: DISCONTINUED | OUTPATIENT
Start: 2023-12-12 | End: 2023-12-14 | Stop reason: HOSPADM

## 2023-12-12 RX ORDER — FENTANYL CITRATE 50 UG/ML
INJECTION, SOLUTION INTRAMUSCULAR; INTRAVENOUS PRN
Status: DISCONTINUED | OUTPATIENT
Start: 2023-12-12 | End: 2023-12-12 | Stop reason: SDUPTHER

## 2023-12-12 RX ORDER — MIDAZOLAM HYDROCHLORIDE 2 MG/2ML
2 INJECTION, SOLUTION INTRAMUSCULAR; INTRAVENOUS
Status: DISCONTINUED | OUTPATIENT
Start: 2023-12-12 | End: 2023-12-12 | Stop reason: HOSPADM

## 2023-12-12 RX ORDER — HYDRALAZINE HYDROCHLORIDE 20 MG/ML
10 INJECTION INTRAMUSCULAR; INTRAVENOUS
Status: DISCONTINUED | OUTPATIENT
Start: 2023-12-12 | End: 2023-12-12 | Stop reason: HOSPADM

## 2023-12-12 RX ORDER — POLYETHYLENE GLYCOL 3350 17 G/17G
17 POWDER, FOR SOLUTION ORAL DAILY
Status: DISCONTINUED | OUTPATIENT
Start: 2023-12-12 | End: 2023-12-14 | Stop reason: HOSPADM

## 2023-12-12 RX ORDER — OXYCODONE HYDROCHLORIDE 5 MG/1
5 TABLET ORAL EVERY 4 HOURS PRN
Status: DISCONTINUED | OUTPATIENT
Start: 2023-12-12 | End: 2023-12-14 | Stop reason: HOSPADM

## 2023-12-12 RX ORDER — MIDAZOLAM HYDROCHLORIDE 1 MG/ML
INJECTION INTRAMUSCULAR; INTRAVENOUS PRN
Status: DISCONTINUED | OUTPATIENT
Start: 2023-12-12 | End: 2023-12-12 | Stop reason: SDUPTHER

## 2023-12-12 RX ORDER — ONDANSETRON 2 MG/ML
4 INJECTION INTRAMUSCULAR; INTRAVENOUS
Status: DISCONTINUED | OUTPATIENT
Start: 2023-12-12 | End: 2023-12-12 | Stop reason: HOSPADM

## 2023-12-12 RX ORDER — SODIUM CHLORIDE 0.9 % (FLUSH) 0.9 %
5-40 SYRINGE (ML) INJECTION PRN
Status: DISCONTINUED | OUTPATIENT
Start: 2023-12-12 | End: 2023-12-14 | Stop reason: HOSPADM

## 2023-12-12 RX ORDER — ONDANSETRON 2 MG/ML
4 INJECTION INTRAMUSCULAR; INTRAVENOUS EVERY 6 HOURS PRN
Status: DISCONTINUED | OUTPATIENT
Start: 2023-12-12 | End: 2023-12-14 | Stop reason: HOSPADM

## 2023-12-12 RX ORDER — METFORMIN HYDROCHLORIDE 500 MG/1
500 TABLET, EXTENDED RELEASE ORAL 2 TIMES DAILY WITH MEALS
Status: DISCONTINUED | OUTPATIENT
Start: 2023-12-12 | End: 2023-12-14 | Stop reason: HOSPADM

## 2023-12-12 RX ORDER — FENTANYL CITRATE 50 UG/ML
100 INJECTION, SOLUTION INTRAMUSCULAR; INTRAVENOUS
Status: DISCONTINUED | OUTPATIENT
Start: 2023-12-12 | End: 2023-12-12 | Stop reason: HOSPADM

## 2023-12-12 RX ORDER — METOPROLOL SUCCINATE 50 MG/1
50 TABLET, EXTENDED RELEASE ORAL DAILY
Status: DISCONTINUED | OUTPATIENT
Start: 2023-12-12 | End: 2023-12-12

## 2023-12-12 RX ORDER — LIDOCAINE HYDROCHLORIDE 10 MG/ML
1 INJECTION, SOLUTION EPIDURAL; INFILTRATION; INTRACAUDAL; PERINEURAL
Status: DISCONTINUED | OUTPATIENT
Start: 2023-12-12 | End: 2023-12-12 | Stop reason: HOSPADM

## 2023-12-12 RX ORDER — ACETAMINOPHEN 325 MG/1
650 TABLET ORAL EVERY 6 HOURS
Status: DISCONTINUED | OUTPATIENT
Start: 2023-12-12 | End: 2023-12-14 | Stop reason: HOSPADM

## 2023-12-12 RX ORDER — OXYCODONE HYDROCHLORIDE 5 MG/1
10 TABLET ORAL EVERY 4 HOURS PRN
Status: DISCONTINUED | OUTPATIENT
Start: 2023-12-12 | End: 2023-12-14 | Stop reason: HOSPADM

## 2023-12-12 RX ORDER — PROCHLORPERAZINE EDISYLATE 5 MG/ML
5 INJECTION INTRAMUSCULAR; INTRAVENOUS
Status: DISCONTINUED | OUTPATIENT
Start: 2023-12-12 | End: 2023-12-12 | Stop reason: HOSPADM

## 2023-12-12 RX ORDER — ACETAMINOPHEN 500 MG
1000 TABLET ORAL ONCE
Status: COMPLETED | OUTPATIENT
Start: 2023-12-12 | End: 2023-12-12

## 2023-12-12 RX ORDER — DIPHENHYDRAMINE HCL 25 MG
25 CAPSULE ORAL EVERY 6 HOURS PRN
Status: DISCONTINUED | OUTPATIENT
Start: 2023-12-12 | End: 2023-12-14 | Stop reason: HOSPADM

## 2023-12-12 RX ORDER — PROPOFOL 10 MG/ML
INJECTION, EMULSION INTRAVENOUS CONTINUOUS PRN
Status: DISCONTINUED | OUTPATIENT
Start: 2023-12-12 | End: 2023-12-12 | Stop reason: SDUPTHER

## 2023-12-12 RX ADMIN — FENTANYL CITRATE 50 MCG: 0.05 INJECTION, SOLUTION INTRAMUSCULAR; INTRAVENOUS at 10:55

## 2023-12-12 RX ADMIN — SODIUM CHLORIDE, POTASSIUM CHLORIDE, SODIUM LACTATE AND CALCIUM CHLORIDE: 600; 310; 30; 20 INJECTION, SOLUTION INTRAVENOUS at 08:30

## 2023-12-12 RX ADMIN — ACETAMINOPHEN 650 MG: 325 TABLET ORAL at 22:01

## 2023-12-12 RX ADMIN — ONDANSETRON 4 MG: 2 INJECTION INTRAMUSCULAR; INTRAVENOUS at 09:18

## 2023-12-12 RX ADMIN — MIDAZOLAM 2 MG: 1 INJECTION INTRAMUSCULAR; INTRAVENOUS at 08:54

## 2023-12-12 RX ADMIN — DIPHENHYDRAMINE HYDROCHLORIDE 25 MG: 25 CAPSULE ORAL at 13:13

## 2023-12-12 RX ADMIN — ATENOLOL 25 MG: 50 TABLET ORAL at 22:02

## 2023-12-12 RX ADMIN — ACETAMINOPHEN 1000 MG: 500 TABLET ORAL at 08:31

## 2023-12-12 RX ADMIN — METFORMIN HYDROCHLORIDE 500 MG: 500 TABLET, EXTENDED RELEASE ORAL at 16:05

## 2023-12-12 RX ADMIN — MEPIVACAINE HYDROCHLORIDE 50 MG: 15 INJECTION, SOLUTION EPIDURAL; INFILTRATION at 09:07

## 2023-12-12 RX ADMIN — WATER 2000 MG: 1 INJECTION INTRAMUSCULAR; INTRAVENOUS; SUBCUTANEOUS at 09:19

## 2023-12-12 RX ADMIN — SENNOSIDES AND DOCUSATE SODIUM 1 TABLET: 50; 8.6 TABLET ORAL at 13:13

## 2023-12-12 RX ADMIN — ASPIRIN 325 MG: 325 TABLET, COATED ORAL at 22:02

## 2023-12-12 RX ADMIN — CHLORTHALIDONE 25 MG: 25 TABLET ORAL at 13:13

## 2023-12-12 RX ADMIN — FENTANYL CITRATE 50 MCG: 0.05 INJECTION, SOLUTION INTRAMUSCULAR; INTRAVENOUS at 09:17

## 2023-12-12 RX ADMIN — FENTANYL CITRATE 50 MCG: 0.05 INJECTION, SOLUTION INTRAMUSCULAR; INTRAVENOUS at 10:59

## 2023-12-12 RX ADMIN — OXYCODONE HYDROCHLORIDE 10 MG: 5 TABLET ORAL at 13:13

## 2023-12-12 RX ADMIN — METHOCARBAMOL TABLETS 500 MG: 500 TABLET, COATED ORAL at 22:01

## 2023-12-12 RX ADMIN — DEXAMETHASONE SODIUM PHOSPHATE 4 MG: 4 INJECTION INTRA-ARTICULAR; INTRALESIONAL; INTRAMUSCULAR; INTRAVENOUS; SOFT TISSUE at 09:18

## 2023-12-12 RX ADMIN — METHOCARBAMOL TABLETS 500 MG: 500 TABLET, COATED ORAL at 13:13

## 2023-12-12 RX ADMIN — KETOROLAC TROMETHAMINE 15 MG: 30 INJECTION, SOLUTION INTRAMUSCULAR; INTRAVENOUS at 15:46

## 2023-12-12 RX ADMIN — ASPIRIN 325 MG: 325 TABLET, COATED ORAL at 13:14

## 2023-12-12 RX ADMIN — WATER 2000 MG: 1 INJECTION INTRAMUSCULAR; INTRAVENOUS; SUBCUTANEOUS at 23:09

## 2023-12-12 RX ADMIN — SODIUM CHLORIDE: 9 INJECTION, SOLUTION INTRAVENOUS at 11:05

## 2023-12-12 RX ADMIN — PHENYLEPHRINE HYDROCHLORIDE 30 MCG/MIN: 10 INJECTION INTRAVENOUS at 09:23

## 2023-12-12 RX ADMIN — OXYCODONE HYDROCHLORIDE 10 MG: 5 TABLET ORAL at 18:11

## 2023-12-12 RX ADMIN — TRANEXAMIC ACID 1000 MG: 100 INJECTION, SOLUTION INTRAVENOUS at 09:20

## 2023-12-12 RX ADMIN — SENNOSIDES AND DOCUSATE SODIUM 1 TABLET: 50; 8.6 TABLET ORAL at 22:02

## 2023-12-12 RX ADMIN — KETOROLAC TROMETHAMINE 15 MG: 30 INJECTION, SOLUTION INTRAMUSCULAR; INTRAVENOUS at 22:02

## 2023-12-12 RX ADMIN — TRAMADOL HYDROCHLORIDE 50 MG: 50 TABLET ORAL at 16:05

## 2023-12-12 RX ADMIN — ACETAMINOPHEN 650 MG: 325 TABLET ORAL at 13:13

## 2023-12-12 RX ADMIN — POLYETHYLENE GLYCOL 3350 17 G: 17 POWDER, FOR SOLUTION ORAL at 13:14

## 2023-12-12 RX ADMIN — SODIUM CHLORIDE: 9 INJECTION, SOLUTION INTRAVENOUS at 18:18

## 2023-12-12 RX ADMIN — SODIUM CHLORIDE, PRESERVATIVE FREE 10 ML: 5 INJECTION INTRAVENOUS at 22:06

## 2023-12-12 RX ADMIN — PROPOFOL 30 MCG/KG/MIN: 10 INJECTION, EMULSION INTRAVENOUS at 09:02

## 2023-12-12 ASSESSMENT — PAIN DESCRIPTION - LOCATION
LOCATION: HIP

## 2023-12-12 ASSESSMENT — PAIN DESCRIPTION - DESCRIPTORS
DESCRIPTORS: ACHING;TIGHTNESS;THROBBING
DESCRIPTORS: TIGHTNESS;THROBBING;ACHING
DESCRIPTORS: ACHING
DESCRIPTORS: TIGHTNESS;THROBBING;ACHING
DESCRIPTORS: ACHING

## 2023-12-12 ASSESSMENT — PAIN DESCRIPTION - ORIENTATION
ORIENTATION: LEFT

## 2023-12-12 ASSESSMENT — PAIN SCALES - GENERAL
PAINLEVEL_OUTOF10: 7
PAINLEVEL_OUTOF10: 7
PAINLEVEL_OUTOF10: 5
PAINLEVEL_OUTOF10: 7

## 2023-12-12 ASSESSMENT — PAIN - FUNCTIONAL ASSESSMENT
PAIN_FUNCTIONAL_ASSESSMENT: 0-10
PAIN_FUNCTIONAL_ASSESSMENT: PREVENTS OR INTERFERES WITH MANY ACTIVE NOT PASSIVE ACTIVITIES

## 2023-12-12 NOTE — PROGRESS NOTES
Patient c/o pain 11/10, reports cannot take dilaudid because is causes itching, messaged on-call provider for pain relief, pt reports toradol and tramadol was effective with last pain medication, patient thrashing in bed, patient wants to place pillow under operative leg, educated patient elevating operative leg on pillow, placed pillow under patients leg, patient reports not ready to stand but did sit on side of bed. See new orders, patient tolerated IVP dilaudid after giving PO benadryl prior to administering.

## 2023-12-12 NOTE — ANESTHESIA PROCEDURE NOTES
Spinal Block    End time: 12/12/2023 9:07 AM  Reason for block: primary anesthetic  Staffing  Performed: resident/CRNA   Anesthesiologist: Bulmaro De Santiago DO  Resident/CRNA: EFRAIN Reddy CRNA  Performed by: EFRAIN Reddy CRNA  Authorized by: Bulmaro De Santiago DO    Spinal Block  Patient position: sitting  Prep: Betadine  Patient monitoring: continuous pulse ox, oxygen and frequent blood pressure checks  Approach: midline  Location: L2/L3  Provider prep: mask and sterile gloves  Local infiltration: lidocaine  Needle  Needle type: Sprotte Tip   Needle gauge: 24 G  Assessment  Swirl obtained: Yes  CSF: clear  Attempts: 1  Hemodynamics: stable  Preanesthetic Checklist  Completed: patient identified, IV checked, site marked, risks and benefits discussed, surgical/procedural consents, equipment checked, pre-op evaluation, timeout performed, anesthesia consent given, oxygen available, monitors applied/VS acknowledged, fire risk safety assessment completed and verbalized and blood product R/B/A discussed and consented

## 2023-12-12 NOTE — ANESTHESIA PRE PROCEDURE
METS)  (+) hypertension:, hyperlipidemia      ECG reviewed  Rhythm: regular  Rate: normal           Beta Blocker:  Dose within 24 Hrs         Neuro/Psych:   (+) depression/anxiety             GI/Hepatic/Renal: Neg GI/Hepatic/Renal ROS            Endo/Other:    (+) DiabetesType II DM, poorly controlled, hyperthyroidism: arthritis: OA. [place] Abdominal: normal exam            Vascular: negative vascular ROS. Other Findings:             Anesthesia Plan      MAC and spinal     ASA 2       Induction: intravenous. MIPS: Postoperative opioids intended and Prophylactic antiemetics administered. Anesthetic plan and risks discussed with patient (Discussed GA as back-up to spinal d/t history of spinal fusion). Use of blood products discussed with patient whom consented to blood products. Plan discussed with CRNA.     Attending anesthesiologist reviewed and agrees with Preprocedure content              Maurice Gross DO   12/12/2023

## 2023-12-12 NOTE — DISCHARGE INSTRUCTIONS
After Hospital Care Plan:  Discharge Instructions Anterior Approach   Hip Replacement-Dr. Carin Erwin    Patient Melquiades Bowden  Date of Yanet@[employer]    Procedure:Procedure(s):  LEFT TOTAL HIP TOTAL ARTHROPLASTY ANTERIOR APPROACH  Surgeon:Surgeon(s) and Role:     * Claudia Hernandez MD - Primary   PCP: @PCP@  Date of discharge: [unfilled]      Follow up appointments  -follow up with Dr. Carin Erwin in 3 weeks. Call [phone] to make an appointment. [facility]: _______________________   phone: _____________________  The agency will contact you to arrange dates/times for visits. Please call them if you do not hear from them within 24 hours after you are discharged    When to call your Orthopaedic Surgeon:  -Signs of hip dislocation-increased hip pain, unrelieved pain or if you have difficulty or are unable to walk  -Signs of infection-if your incision is red; continues to have drainage; drainage has a foul odor or if you have a persistent fever over 101 degrees  -Signs of a blood clot in your leg-calf pain, tenderness, redness, swelling of lower leg    When to call your Primary Care Physician:  -Concerns about medical conditions such as diabetes, high blood pressure, asthma, congestive heart failure  -Call if blood sugars are elevated, persistent headache or dizziness, coughing or congestion, constipation or diarrhea, burning with urination, abnormal heart rate    When to call 911and go to the nearest emergency room  -acute onset of chest pain, shortness of breath, difficulty breathing    Activity  - weight bearing as tolerated with walker or crutches.  Refer to pages 26-36 of your handbook for instructions and pictures  -20 repetitions of each exercise 3 times each day as instructed by the physical therapist.  Refer to pages 38-45 of our handbook for instructions and pictures  -get up every one hour and walk (except at night when sleeping)  -Avoid extreme movement of hip to prevent

## 2023-12-12 NOTE — OP NOTE
yrs female with progressive debilitating left hip and groin pain due to severe osteoarthritis. Symptoms have progressed despite comprehensive conservative treatment and he presents for left total hip replacement. Risks, benefits, alternatives of the procedure were reviewed in detail and he desires to proceed. The patient understands the increased risk for perioperative medical complications due to medical comorbidities. DESCRIPTION OF PROCEDURE: Anesthetic was initiated. Preoperative dose of IV antibiotic was given. Callaway catheter was not placed. The left side was confirmed as the operative side, prepped and draped in the usual sterile fashion. Skin was covered with Ioban occlusive dressing. Direct anterior exposure was made to the patient's hip through the sartorius tensor interval. Anterior hip vasculature was cauterized. Retractors were taken out to observe for bleeding and there was none. The capsule was identified, opened and T'd distally. The femoral neck was osteotomized. Femoral head was removed from the acetabulum, which was exposed and soft tissues were removed. The acetabulum was progressively  reamed to 47 mm and a 48 mm trial shell was impacted with good press-fit. This was removed and a 48 mm sector shell was impacted in the acetabulum in 40 degrees of abduction in an anatomic-type anteversion. Bone spurs were removed and 6.5 screws x2 were placed. The polyethylene liner was placed. Femur was positioned and elevated from the wound. The medullary canal was entered. Flexible reamers were not utilized as the patient did not have a narrowed femoral canal, broached to a size 2. Calcar planed and then trialed. A 36 mm , +9 hip ball was the most appropriate for leg length and tension with a standard offset stem. The hip was dislocated. The anterior greater trochanter was trimmed down to enhance flexion, rotation and stability. The trial was removed and the real stem was impacted.  The real hip ball

## 2023-12-12 NOTE — ANESTHESIA POSTPROCEDURE EVALUATION
Department of Anesthesiology  Postprocedure Note    Patient: Lemuel Stock  MRN: [id number]  YOB: 1960  Date of evaluation: 12/12/2023      Procedure Summary       Date: 12/12/23 Room / Location: [address] [facility] MAIN OR 16 / [address] [facility] MAIN OR    Anesthesia Start: 0857 Anesthesia Stop: 1059    Procedure: LEFT TOTAL HIP TOTAL ARTHROPLASTY ANTERIOR APPROACH (Left: Hip) Diagnosis:       Primary osteoarthritis of left hip      (Primary osteoarthritis of left hip [M16.12])    Providers: Claudia Hernandez MD Responsible Provider: Rafael Montes DO    Anesthesia Type: MAC, Spinal ASA Status: 2            Anesthesia Type: MAC, Spinal    Jose Phase I: Jose Score: 10    Jose Phase II:        Anesthesia Post Evaluation    Patient location during evaluation: bedside  Patient participation: complete - patient participated  Level of consciousness: awake and alert  Pain score: 0  Airway patency: patent  Nausea & Vomiting: no nausea and no vomiting  Complications: no  Cardiovascular status: blood pressure returned to baseline and hemodynamically stable  Respiratory status: room air  Hydration status: euvolemic  Multimodal analgesia pain management approach  Pain management: adequate and satisfactory to patient

## 2023-12-13 LAB
ANION GAP SERPL CALC-SCNC: 9 MMOL/L (ref 5–15)
BUN SERPL-MCNC: 18 MG/DL (ref 6–20)
BUN/CREAT SERPL: 30 (ref 12–20)
CALCIUM SERPL-MCNC: 6.5 MG/DL (ref 8.5–10.1)
CHLORIDE SERPL-SCNC: 116 MMOL/L (ref 97–108)
CO2 SERPL-SCNC: 19 MMOL/L (ref 21–32)
CREAT SERPL-MCNC: 0.61 MG/DL (ref 0.55–1.02)
GLUCOSE BLD STRIP.AUTO-MCNC: 122 MG/DL (ref 65–117)
GLUCOSE BLD STRIP.AUTO-MCNC: 125 MG/DL (ref 65–117)
GLUCOSE BLD STRIP.AUTO-MCNC: 127 MG/DL (ref 65–117)
GLUCOSE BLD STRIP.AUTO-MCNC: 182 MG/DL (ref 65–117)
GLUCOSE SERPL-MCNC: 109 MG/DL (ref 65–100)
HCT VFR BLD AUTO: 25.3 % (ref 35–47)
HGB BLD-MCNC: 7.8 G/DL (ref 11.5–16)
POTASSIUM SERPL-SCNC: 3.2 MMOL/L (ref 3.5–5.1)
SERVICE CMNT-IMP: ABNORMAL
SODIUM SERPL-SCNC: 144 MMOL/L (ref 136–145)

## 2023-12-13 PROCEDURE — G0378 HOSPITAL OBSERVATION PER HR: HCPCS

## 2023-12-13 PROCEDURE — 97110 THERAPEUTIC EXERCISES: CPT

## 2023-12-13 PROCEDURE — 36415 COLL VENOUS BLD VENIPUNCTURE: CPT

## 2023-12-13 PROCEDURE — 80048 BASIC METABOLIC PNL TOTAL CA: CPT

## 2023-12-13 PROCEDURE — 2580000003 HC RX 258: Performed by: PHYSICIAN ASSISTANT

## 2023-12-13 PROCEDURE — 97535 SELF CARE MNGMENT TRAINING: CPT

## 2023-12-13 PROCEDURE — 85014 HEMATOCRIT: CPT

## 2023-12-13 PROCEDURE — 94760 N-INVAS EAR/PLS OXIMETRY 1: CPT

## 2023-12-13 PROCEDURE — 85018 HEMOGLOBIN: CPT

## 2023-12-13 PROCEDURE — 6360000002 HC RX W HCPCS: Performed by: PHYSICIAN ASSISTANT

## 2023-12-13 PROCEDURE — 6370000000 HC RX 637 (ALT 250 FOR IP): Performed by: ORTHOPAEDIC SURGERY

## 2023-12-13 PROCEDURE — 97116 GAIT TRAINING THERAPY: CPT

## 2023-12-13 PROCEDURE — 82962 GLUCOSE BLOOD TEST: CPT

## 2023-12-13 PROCEDURE — 96376 TX/PRO/DX INJ SAME DRUG ADON: CPT

## 2023-12-13 PROCEDURE — 6370000000 HC RX 637 (ALT 250 FOR IP): Performed by: PHYSICIAN ASSISTANT

## 2023-12-13 PROCEDURE — 97165 OT EVAL LOW COMPLEX 30 MIN: CPT

## 2023-12-13 PROCEDURE — 2700000000 HC OXYGEN THERAPY PER DAY

## 2023-12-13 PROCEDURE — 96374 THER/PROPH/DIAG INJ IV PUSH: CPT

## 2023-12-13 RX ORDER — ASPIRIN 325 MG
325 TABLET, DELAYED RELEASE (ENTERIC COATED) ORAL 2 TIMES DAILY
Qty: 30 TABLET | Refills: 3 | Status: SHIPPED | OUTPATIENT
Start: 2023-12-13

## 2023-12-13 RX ORDER — OXYCODONE HYDROCHLORIDE 5 MG/1
5 TABLET ORAL EVERY 4 HOURS PRN
Qty: 40 TABLET | Refills: 0 | Status: SHIPPED | OUTPATIENT
Start: 2023-12-13 | End: 2023-12-16

## 2023-12-13 RX ADMIN — METHOCARBAMOL TABLETS 500 MG: 500 TABLET, COATED ORAL at 09:02

## 2023-12-13 RX ADMIN — ACETAMINOPHEN 650 MG: 325 TABLET ORAL at 09:02

## 2023-12-13 RX ADMIN — CHLORTHALIDONE 25 MG: 25 TABLET ORAL at 09:02

## 2023-12-13 RX ADMIN — DIPHENHYDRAMINE HYDROCHLORIDE 25 MG: 25 CAPSULE ORAL at 19:09

## 2023-12-13 RX ADMIN — METHOCARBAMOL TABLETS 500 MG: 500 TABLET, COATED ORAL at 20:25

## 2023-12-13 RX ADMIN — ACETAMINOPHEN 650 MG: 325 TABLET ORAL at 20:24

## 2023-12-13 RX ADMIN — KETOROLAC TROMETHAMINE 15 MG: 30 INJECTION, SOLUTION INTRAMUSCULAR; INTRAVENOUS at 09:03

## 2023-12-13 RX ADMIN — SENNOSIDES AND DOCUSATE SODIUM 1 TABLET: 50; 8.6 TABLET ORAL at 09:02

## 2023-12-13 RX ADMIN — OXYCODONE HYDROCHLORIDE 10 MG: 5 TABLET ORAL at 02:12

## 2023-12-13 RX ADMIN — METFORMIN HYDROCHLORIDE 500 MG: 500 TABLET, EXTENDED RELEASE ORAL at 09:02

## 2023-12-13 RX ADMIN — ACETAMINOPHEN 650 MG: 325 TABLET ORAL at 15:09

## 2023-12-13 RX ADMIN — SODIUM CHLORIDE: 9 INJECTION, SOLUTION INTRAVENOUS at 10:49

## 2023-12-13 RX ADMIN — SENNOSIDES AND DOCUSATE SODIUM 1 TABLET: 50; 8.6 TABLET ORAL at 20:49

## 2023-12-13 RX ADMIN — TRAMADOL HYDROCHLORIDE 50 MG: 50 TABLET ORAL at 13:22

## 2023-12-13 RX ADMIN — SODIUM CHLORIDE, PRESERVATIVE FREE 10 ML: 5 INJECTION INTRAVENOUS at 20:27

## 2023-12-13 RX ADMIN — KETOROLAC TROMETHAMINE 15 MG: 30 INJECTION, SOLUTION INTRAMUSCULAR; INTRAVENOUS at 20:25

## 2023-12-13 RX ADMIN — KETOROLAC TROMETHAMINE 15 MG: 30 INJECTION, SOLUTION INTRAMUSCULAR; INTRAVENOUS at 15:09

## 2023-12-13 RX ADMIN — KETOROLAC TROMETHAMINE 15 MG: 30 INJECTION, SOLUTION INTRAMUSCULAR; INTRAVENOUS at 03:40

## 2023-12-13 RX ADMIN — OXYCODONE HYDROCHLORIDE 10 MG: 5 TABLET ORAL at 15:09

## 2023-12-13 RX ADMIN — ASPIRIN 325 MG: 325 TABLET, COATED ORAL at 20:25

## 2023-12-13 RX ADMIN — BISACODYL 5 MG: 5 TABLET, COATED ORAL at 09:02

## 2023-12-13 RX ADMIN — ATENOLOL 25 MG: 50 TABLET ORAL at 20:24

## 2023-12-13 RX ADMIN — ASPIRIN 325 MG: 325 TABLET, COATED ORAL at 09:02

## 2023-12-13 RX ADMIN — SODIUM CHLORIDE: 9 INJECTION, SOLUTION INTRAVENOUS at 07:25

## 2023-12-13 RX ADMIN — METFORMIN HYDROCHLORIDE 500 MG: 500 TABLET, EXTENDED RELEASE ORAL at 17:02

## 2023-12-13 RX ADMIN — SODIUM CHLORIDE, PRESERVATIVE FREE 10 ML: 5 INJECTION INTRAVENOUS at 09:04

## 2023-12-13 RX ADMIN — SODIUM CHLORIDE: 9 INJECTION, SOLUTION INTRAVENOUS at 02:00

## 2023-12-13 RX ADMIN — ACETAMINOPHEN 650 MG: 325 TABLET ORAL at 03:40

## 2023-12-13 ASSESSMENT — PAIN SCALES - GENERAL
PAINLEVEL_OUTOF10: 8
PAINLEVEL_OUTOF10: 3
PAINLEVEL_OUTOF10: 5
PAINLEVEL_OUTOF10: 3
PAINLEVEL_OUTOF10: 3
PAINLEVEL_OUTOF10: 2
PAINLEVEL_OUTOF10: 6
PAINLEVEL_OUTOF10: 3
PAINLEVEL_OUTOF10: 5

## 2023-12-13 ASSESSMENT — PAIN DESCRIPTION - LOCATION
LOCATION: HIP

## 2023-12-13 ASSESSMENT — PAIN DESCRIPTION - DESCRIPTORS
DESCRIPTORS: ACHING
DESCRIPTORS: ACHING
DESCRIPTORS: ACHING;THROBBING
DESCRIPTORS: ACHING

## 2023-12-13 ASSESSMENT — PAIN DESCRIPTION - ORIENTATION
ORIENTATION: LEFT

## 2023-12-13 NOTE — PROGRESS NOTES
Occupational Therapy    Orders received, chart reviewed and patient evaluated by occupational therapy, recommend:  No skilled occupational therapy    Recommend with nursing patient to complete as able in order to maintain strength, endurance and independence: OOB to chair 3x/day, ADLs with supervision/setup and mobilizing to the bathroom for RW toileting with contact guard assist. Thank you for your assistance. Full evaluation to follow.      Thank you,  Bethanie Camejo, OT

## 2023-12-13 NOTE — PROGRESS NOTES
Complaints: none   Events: none    VSS / AFEBRILE      GEN:  NAD. AOx3   ABD:  S/NT/ND   LLE:  Dressing C/D/I    5/5 motor    Calf nttp (Bilat)    Sensate all distribution to light touch    1+ dp/pt pulses, foot perfused      Lab Results   Component Value Date/Time    HGB 7.8 12/13/2023 05:30 AM    INR 1.0 12/04/2023 09:34 AM       Lab Results   Component Value Date/Time     12/13/2023 05:30 AM    K 3.2 12/13/2023 05:30 AM     12/13/2023 05:30 AM    CO2 19 12/13/2023 05:30 AM    BUN 18 12/13/2023 05:30 AM            POD #1 LEFT TOTAL HIP REPLACEMENT. Satisfactory progress. ABX: Complete today  DVT Prophylaxis: ASA  Weight Bearing: WBAT LLE   Pain Control: PRN oral narcotics  Anticipated Discharge Date: tomorrow   Disposition: Home, [facility].

## 2023-12-13 NOTE — CARE COORDINATION
Care Management Initial Assessment       RUR: Obs  Readmission? No  1st IM letter given? No  1st  letter given: No    Referral sent to [facility]    Daughter will provide transportation home. 12/13/23 1255   Service Assessment   Patient Orientation Alert and Oriented   Cognition Alert   History Provided By Patient   Primary [facility]   PCP Verified by CM Yes   Can patient return to prior living arrangement Yes   Ability to make needs known: Good   Family able to assist with home care needs: Yes   Services At/After Discharge   Services At/After Discharge Nursing services;PT   Confirm Follow Up Transport Family   Condition of Participation: Discharge Planning   The Patient and/or Patient Representative was provided with a Choice of Provider? Patient   The Patient and/Or Patient Representative agree with the Discharge Plan? Yes   Freedom of Choice list was provided with basic dialogue that supports the patient's individualized plan of care/goals, treatment preferences, and shares the quality data associated with the providers? Yes   Documentation for Discharge Appeal   Discharge Appealed by  --      BROOKLYN Lane MSA, RN, CM

## 2023-12-13 NOTE — PROGRESS NOTES
Patient was with staff and unavailable. Will follow up as needed.     Silvia Phillips, [facility], [place]ast[facility] Provider

## 2023-12-14 VITALS
HEIGHT: 63 IN | TEMPERATURE: 98.5 F | HEART RATE: 84 BPM | OXYGEN SATURATION: 97 % | BODY MASS INDEX: 31.01 KG/M2 | SYSTOLIC BLOOD PRESSURE: 162 MMHG | WEIGHT: 175 LBS | DIASTOLIC BLOOD PRESSURE: 65 MMHG | RESPIRATION RATE: 16 BRPM

## 2023-12-14 LAB
GLUCOSE BLD STRIP.AUTO-MCNC: 129 MG/DL (ref 65–117)
SERVICE CMNT-IMP: ABNORMAL

## 2023-12-14 PROCEDURE — 2580000003 HC RX 258: Performed by: PHYSICIAN ASSISTANT

## 2023-12-14 PROCEDURE — 97116 GAIT TRAINING THERAPY: CPT

## 2023-12-14 PROCEDURE — 82962 GLUCOSE BLOOD TEST: CPT

## 2023-12-14 PROCEDURE — G0378 HOSPITAL OBSERVATION PER HR: HCPCS

## 2023-12-14 PROCEDURE — 97535 SELF CARE MNGMENT TRAINING: CPT

## 2023-12-14 PROCEDURE — 96376 TX/PRO/DX INJ SAME DRUG ADON: CPT

## 2023-12-14 PROCEDURE — 6360000002 HC RX W HCPCS: Performed by: PHYSICIAN ASSISTANT

## 2023-12-14 PROCEDURE — 6370000000 HC RX 637 (ALT 250 FOR IP): Performed by: PHYSICIAN ASSISTANT

## 2023-12-14 RX ORDER — TRAMADOL HYDROCHLORIDE 50 MG/1
50 TABLET ORAL EVERY 4 HOURS PRN
Qty: 42 TABLET | Refills: 0 | Status: SHIPPED | OUTPATIENT
Start: 2023-12-14 | End: 2023-12-21

## 2023-12-14 RX ORDER — METHOCARBAMOL 500 MG/1
500 TABLET, FILM COATED ORAL 2 TIMES DAILY
Qty: 40 TABLET | Refills: 0 | Status: SHIPPED | OUTPATIENT
Start: 2023-12-14 | End: 2024-01-03

## 2023-12-14 RX ADMIN — ACETAMINOPHEN 650 MG: 325 TABLET ORAL at 02:54

## 2023-12-14 RX ADMIN — ASPIRIN 325 MG: 325 TABLET, COATED ORAL at 10:14

## 2023-12-14 RX ADMIN — METHOCARBAMOL TABLETS 500 MG: 500 TABLET, COATED ORAL at 10:14

## 2023-12-14 RX ADMIN — ACETAMINOPHEN 650 MG: 325 TABLET ORAL at 10:14

## 2023-12-14 RX ADMIN — BISACODYL 5 MG: 5 TABLET, COATED ORAL at 10:14

## 2023-12-14 RX ADMIN — KETOROLAC TROMETHAMINE 15 MG: 30 INJECTION, SOLUTION INTRAMUSCULAR; INTRAVENOUS at 10:15

## 2023-12-14 RX ADMIN — SENNOSIDES AND DOCUSATE SODIUM 1 TABLET: 50; 8.6 TABLET ORAL at 10:14

## 2023-12-14 RX ADMIN — SODIUM CHLORIDE, PRESERVATIVE FREE 10 ML: 5 INJECTION INTRAVENOUS at 10:15

## 2023-12-14 RX ADMIN — POLYETHYLENE GLYCOL 3350 17 G: 17 POWDER, FOR SOLUTION ORAL at 10:15

## 2023-12-14 RX ADMIN — TRAMADOL HYDROCHLORIDE 50 MG: 50 TABLET ORAL at 10:23

## 2023-12-14 RX ADMIN — METFORMIN HYDROCHLORIDE 500 MG: 500 TABLET, EXTENDED RELEASE ORAL at 10:14

## 2023-12-14 RX ADMIN — KETOROLAC TROMETHAMINE 15 MG: 30 INJECTION, SOLUTION INTRAMUSCULAR; INTRAVENOUS at 02:54

## 2023-12-14 RX ADMIN — TRAMADOL HYDROCHLORIDE 50 MG: 50 TABLET ORAL at 02:54

## 2023-12-14 RX ADMIN — CHLORTHALIDONE 25 MG: 25 TABLET ORAL at 10:14

## 2023-12-14 ASSESSMENT — PAIN SCALES - GENERAL
PAINLEVEL_OUTOF10: 6
PAINLEVEL_OUTOF10: 6
PAINLEVEL_OUTOF10: 2

## 2023-12-14 ASSESSMENT — PAIN DESCRIPTION - LOCATION
LOCATION: HIP

## 2023-12-14 ASSESSMENT — PAIN DESCRIPTION - DESCRIPTORS
DESCRIPTORS: ACHING
DESCRIPTORS: ACHING;SORE
DESCRIPTORS: ACHING;SORE

## 2023-12-14 ASSESSMENT — PAIN DESCRIPTION - ORIENTATION
ORIENTATION: LEFT
ORIENTATION: LEFT

## 2023-12-14 ASSESSMENT — PAIN - FUNCTIONAL ASSESSMENT: PAIN_FUNCTIONAL_ASSESSMENT: ACTIVITIES ARE NOT PREVENTED

## 2023-12-14 NOTE — PROGRESS NOTES
PHYSICAL THERAPY    Patient is cleared for discharge from PT standpoint. Full note to follow.     Miracle Mathews

## 2023-12-14 NOTE — PROGRESS NOTES
I have reviewed the discharge instructions with the patient. The patient verbalized understanding. All personal items were gathered and IV access was discontinued.

## 2024-02-01 ENCOUNTER — TELEPHONE (OUTPATIENT)
Dept: PRIMARY CARE CLINIC | Facility: CLINIC | Age: 64
End: 2024-02-01

## 2024-02-01 NOTE — TELEPHONE ENCOUNTER
Patient called stating she has a boil on her labia which is very painful and she is also anemic.  She wanted to see if she could get an appt with Dr. Dimas on Monday, 2/5 but I didn't see any availability until Wed, 2/7.  She would like a call back at #[phone].

## 2024-02-07 ENCOUNTER — OFFICE VISIT (OUTPATIENT)
Dept: PRIMARY CARE CLINIC | Facility: CLINIC | Age: 64
End: 2024-02-07
Payer: MEDICARE

## 2024-02-07 VITALS
OXYGEN SATURATION: 100 % | HEART RATE: 77 BPM | DIASTOLIC BLOOD PRESSURE: 82 MMHG | RESPIRATION RATE: 16 BRPM | TEMPERATURE: 97.1 F | SYSTOLIC BLOOD PRESSURE: 152 MMHG | WEIGHT: 178 LBS | BODY MASS INDEX: 31.54 KG/M2 | HEIGHT: 63 IN

## 2024-02-07 DIAGNOSIS — I10 PRIMARY HYPERTENSION: Primary | ICD-10-CM

## 2024-02-07 DIAGNOSIS — L30.4 ERYTHEMA INTERTRIGO: ICD-10-CM

## 2024-02-07 DIAGNOSIS — E11.9 TYPE 2 DIABETES MELLITUS WITHOUT COMPLICATION, WITHOUT LONG-TERM CURRENT USE OF INSULIN (HCC): ICD-10-CM

## 2024-02-07 DIAGNOSIS — D50.8 OTHER IRON DEFICIENCY ANEMIA: ICD-10-CM

## 2024-02-07 DIAGNOSIS — Z12.31 ENCOUNTER FOR SCREENING MAMMOGRAM FOR MALIGNANT NEOPLASM OF BREAST: ICD-10-CM

## 2024-02-07 DIAGNOSIS — Z96.642 S/P HIP REPLACEMENT, LEFT: ICD-10-CM

## 2024-02-07 PROCEDURE — 1123F ACP DISCUSS/DSCN MKR DOCD: CPT | Performed by: INTERNAL MEDICINE

## 2024-02-07 PROCEDURE — G8484 FLU IMMUNIZE NO ADMIN: HCPCS | Performed by: INTERNAL MEDICINE

## 2024-02-07 PROCEDURE — 99214 OFFICE O/P EST MOD 30 MIN: CPT | Performed by: INTERNAL MEDICINE

## 2024-02-07 PROCEDURE — 3079F DIAST BP 80-89 MM HG: CPT | Performed by: INTERNAL MEDICINE

## 2024-02-07 PROCEDURE — G8399 PT W/DXA RESULTS DOCUMENT: HCPCS | Performed by: INTERNAL MEDICINE

## 2024-02-07 PROCEDURE — 3077F SYST BP >= 140 MM HG: CPT | Performed by: INTERNAL MEDICINE

## 2024-02-07 PROCEDURE — G8417 CALC BMI ABV UP PARAM F/U: HCPCS | Performed by: INTERNAL MEDICINE

## 2024-02-07 PROCEDURE — G8427 DOCREV CUR MEDS BY ELIG CLIN: HCPCS | Performed by: INTERNAL MEDICINE

## 2024-02-07 PROCEDURE — 3017F COLORECTAL CA SCREEN DOC REV: CPT | Performed by: INTERNAL MEDICINE

## 2024-02-07 PROCEDURE — 3046F HEMOGLOBIN A1C LEVEL >9.0%: CPT | Performed by: INTERNAL MEDICINE

## 2024-02-07 PROCEDURE — 1036F TOBACCO NON-USER: CPT | Performed by: INTERNAL MEDICINE

## 2024-02-07 PROCEDURE — 1090F PRES/ABSN URINE INCON ASSESS: CPT | Performed by: INTERNAL MEDICINE

## 2024-02-07 PROCEDURE — 2022F DILAT RTA XM EVC RTNOPTHY: CPT | Performed by: INTERNAL MEDICINE

## 2024-02-07 RX ORDER — CLOTRIMAZOLE AND BETAMETHASONE DIPROPIONATE 10; .64 MG/G; MG/G
CREAM TOPICAL
Qty: 15 G | Refills: 1 | Status: SHIPPED | OUTPATIENT
Start: 2024-02-07

## 2024-02-07 RX ORDER — OMEGA-3/DHA/EPA/FISH OIL 60 MG-90MG
CAPSULE ORAL
COMMUNITY

## 2024-02-07 ASSESSMENT — ENCOUNTER SYMPTOMS
CHEST TIGHTNESS: 0
SORE THROAT: 0
SHORTNESS OF BREATH: 0
BACK PAIN: 0
COLOR CHANGE: 0
COUGH: 0
EYE DISCHARGE: 0
DIARRHEA: 0
CONSTIPATION: 0
ABDOMINAL PAIN: 0
RHINORRHEA: 0

## 2024-02-07 ASSESSMENT — PATIENT HEALTH QUESTIONNAIRE - PHQ9
2. FEELING DOWN, DEPRESSED OR HOPELESS: 0
SUM OF ALL RESPONSES TO PHQ QUESTIONS 1-9: 0
1. LITTLE INTEREST OR PLEASURE IN DOING THINGS: 0
SUM OF ALL RESPONSES TO PHQ QUESTIONS 1-9: 0
SUM OF ALL RESPONSES TO PHQ9 QUESTIONS 1 & 2: 0
SUM OF ALL RESPONSES TO PHQ QUESTIONS 1-9: 0
SUM OF ALL RESPONSES TO PHQ QUESTIONS 1-9: 0

## 2024-02-07 NOTE — PROGRESS NOTES
Brittani Dey (:  1960) is a 63 y.o. female, Established patient, here for evaluation of the following chief complaint(s):  Skin Problem and Medicare AWV (Welcome )       ASSESSMENT/PLAN:  1. Primary hypertension  She is not open to increasing atenolol 25mg daily at th moment. I recommend that she increase  atenolol 50 mg daily and chlorthalidone 25mg daily. She is reluctant to take 2 tablets of Atenolol at this time. She thinks B. P is high due to pain.     2. S/P hip replacement, left  She is followed by Dr. Barbour (Orthopedics).    3. Other iron deficiency anemia  -     CBC; Future  -     Comprehensive Metabolic Panel; Future  Labs from 2023 were reviewed. I ordered a CBC and a CMP.    4. Type 2 diabetes mellitus without complication, without long-term current use of insulin ([facility])  -      DIABETES FOOT EXAM  -     Microalbumin / Creatinine Urine Ratio; Future  I performed a diabetic foot exam. I ordered a urinalysis to measure microalbumin/ creatinine ratio. I recommend that she continue taking     5. Erythema intertrigo  -     clotrimazole-betamethasone (LOTRISONE) 1-0.05 % cream; Apply topically 2 times daily., Disp-15 g, R-1, Normal sent to pharmacy.  I prescribed lotrisone 1-0.05% cream to start applying BID. Potential side effects were discussed.     6. Encounter for screening mammogram for malignant neoplasm of breast  -     [facility] DIGITAL SCREEN W OR WO CAD BILATERAL; Future  I ordered a mammogram.     USPTF recommendations discussed with patients.          Subjective   SUBJECTIVE/OBJECTIVE:  HPI    Patient presents today for a hospital follow-up.     She notes that during her total left hip replacement performed by Dr. Barbour (Orthopedics) on 2023 she was experiencing dizziness. Her hemoglobin levels reduced from 11.5g/dL on  to 7.8g/dL on 2023.     She demonstrates lymphedema and a rash around her  area.    She is experiencing left ear pain.    Her BP is

## 2024-02-07 NOTE — PROGRESS NOTES
Health Decision Maker has been checked with the patient      Patient has stated that the scribe can come in room    Chief Complaint   Patient presents with    Care Transitions     Depression: Not at risk (12/6/2023)    PHQ-2     PHQ-2 Score: 0      BP (!) 152/82 (Site: Left Upper Arm, Position: Sitting)   Pulse 77   Temp 97.1 °F (36.2 °C)   Resp 16   Ht 1.588 m (5' 2.5\")   Wt 80.7 kg (178 lb)   SpO2 100%   BMI 32.04 kg/m²     \"Have you been to the ER, urgent care clinic since your last visit?  Hospitalized since your last visit?\"    NO    “Have you seen or consulted any other health care providers outside of [facility] since your last visit?”    NO       Have you had a mammogram?”   YES - Where: [facility] but unknown as to when Nurse/CMA to request most recent records if not in the chart

## 2024-02-12 DIAGNOSIS — D50.8 OTHER IRON DEFICIENCY ANEMIA: ICD-10-CM

## 2024-02-12 DIAGNOSIS — E11.9 TYPE 2 DIABETES MELLITUS WITHOUT COMPLICATION, WITHOUT LONG-TERM CURRENT USE OF INSULIN (HCC): ICD-10-CM

## 2024-02-12 LAB
ALBUMIN SERPL-MCNC: 4 G/DL (ref 3.5–5)
ALBUMIN/GLOB SERPL: 1.3 (ref 1.1–2.2)
ALP SERPL-CCNC: 66 U/L (ref 45–117)
ALT SERPL-CCNC: 66 U/L (ref 12–78)
ANION GAP SERPL CALC-SCNC: 5 MMOL/L (ref 5–15)
AST SERPL-CCNC: 44 U/L (ref 15–37)
BILIRUB SERPL-MCNC: 0.4 MG/DL (ref 0.2–1)
BUN SERPL-MCNC: 23 MG/DL (ref 6–20)
BUN/CREAT SERPL: 30 (ref 12–20)
CALCIUM SERPL-MCNC: 10 MG/DL (ref 8.5–10.1)
CHLORIDE SERPL-SCNC: 105 MMOL/L (ref 97–108)
CO2 SERPL-SCNC: 29 MMOL/L (ref 21–32)
CREAT SERPL-MCNC: 0.76 MG/DL (ref 0.55–1.02)
CREAT UR-MCNC: 169 MG/DL
ERYTHROCYTE [DISTWIDTH] IN BLOOD BY AUTOMATED COUNT: 15.2 % (ref 11.5–14.5)
GLOBULIN SER CALC-MCNC: 3.2 G/DL (ref 2–4)
GLUCOSE SERPL-MCNC: 143 MG/DL (ref 65–100)
HCT VFR BLD AUTO: 39 % (ref 35–47)
HGB BLD-MCNC: 11.7 G/DL (ref 11.5–16)
MCH RBC QN AUTO: 23.9 PG (ref 26–34)
MCHC RBC AUTO-ENTMCNC: 30 G/DL (ref 30–36.5)
MCV RBC AUTO: 79.8 FL (ref 80–99)
MICROALBUMIN UR-MCNC: 1.93 MG/DL
MICROALBUMIN/CREAT UR-RTO: 11 MG/G (ref 0–30)
NRBC # BLD: 0 K/UL (ref 0–0.01)
NRBC BLD-RTO: 0 PER 100 WBC
PLATELET # BLD AUTO: 336 K/UL (ref 150–400)
PMV BLD AUTO: 11.2 FL (ref 8.9–12.9)
POTASSIUM SERPL-SCNC: 3.9 MMOL/L (ref 3.5–5.1)
PROT SERPL-MCNC: 7.2 G/DL (ref 6.4–8.2)
RBC # BLD AUTO: 4.89 M/UL (ref 3.8–5.2)
SODIUM SERPL-SCNC: 139 MMOL/L (ref 136–145)
SPECIMEN HOLD: NORMAL
WBC # BLD AUTO: 7.6 K/UL (ref 3.6–11)

## 2024-03-04 DIAGNOSIS — I10 PRIMARY HYPERTENSION: ICD-10-CM

## 2024-03-05 RX ORDER — ATENOLOL 25 MG/1
25 TABLET ORAL DAILY
Qty: 90 TABLET | Refills: 1 | Status: SHIPPED | OUTPATIENT
Start: 2024-03-05

## 2024-03-05 NOTE — TELEPHONE ENCOUNTER
PCP: Millie Dimas MD    Last Visit Visit date not found   Future Appointments   Date Time Provider Department Center   3/5/2024  3:00 PM Marco Ward MD [facility]       Requested Prescriptions     Pending Prescriptions Disp Refills    atenolol (TENORMIN) 25 MG tablet [Pharmacy Med Name: ATENOLOL 25 MG TABLET] 90 tablet 1     Sig: TAKE 1 TABLET BY MOUTH EVERY DAY         Other Comments: Last Refill

## 2024-03-13 DIAGNOSIS — E11.9 TYPE 2 DIABETES MELLITUS WITHOUT COMPLICATIONS (HCC): ICD-10-CM

## 2024-03-13 RX ORDER — METFORMIN HYDROCHLORIDE 500 MG/1
TABLET, EXTENDED RELEASE ORAL
Qty: 180 TABLET | Refills: 0 | Status: SHIPPED | OUTPATIENT
Start: 2024-03-13

## 2024-06-01 DIAGNOSIS — I10 BENIGN ESSENTIAL HYPERTENSION: ICD-10-CM

## 2024-06-02 RX ORDER — CHLORTHALIDONE 25 MG/1
25 TABLET ORAL DAILY
Qty: 90 TABLET | Refills: 0 | Status: SHIPPED | OUTPATIENT
Start: 2024-06-02

## 2024-06-12 DIAGNOSIS — E11.9 TYPE 2 DIABETES MELLITUS WITHOUT COMPLICATIONS (HCC): ICD-10-CM

## 2024-06-12 RX ORDER — METFORMIN HYDROCHLORIDE 500 MG/1
TABLET, EXTENDED RELEASE ORAL
Qty: 60 TABLET | Refills: 0 | Status: SHIPPED | OUTPATIENT
Start: 2024-06-12

## 2024-06-13 ENCOUNTER — HOSPITAL ENCOUNTER (OUTPATIENT)
Facility: HOSPITAL | Age: 64
Discharge: HOME | End: 2024-06-13
Attending: INTERNAL MEDICINE
Payer: MEDICARE

## 2024-06-13 DIAGNOSIS — Z12.31 ENCOUNTER FOR SCREENING MAMMOGRAM FOR MALIGNANT NEOPLASM OF BREAST: ICD-10-CM

## 2024-06-13 PROCEDURE — 77067 SCR MAMMO BI INCL CAD: CPT

## 2024-06-13 PROCEDURE — 77063 BREAST TOMOSYNTHESIS BI: CPT

## 2024-07-04 DIAGNOSIS — E11.9 TYPE 2 DIABETES MELLITUS WITHOUT COMPLICATIONS (HCC): ICD-10-CM

## 2024-07-09 RX ORDER — METFORMIN HYDROCHLORIDE 500 MG/1
500 TABLET, EXTENDED RELEASE ORAL 2 TIMES DAILY
Qty: 180 TABLET | Refills: 0 | Status: SHIPPED | OUTPATIENT
Start: 2024-07-09

## 2024-08-31 DIAGNOSIS — I10 PRIMARY HYPERTENSION: ICD-10-CM

## 2024-08-31 RX ORDER — ATENOLOL 25 MG/1
25 TABLET ORAL DAILY
Qty: 90 TABLET | Refills: 1 | Status: SHIPPED | OUTPATIENT
Start: 2024-08-31

## 2024-09-01 DIAGNOSIS — I10 BENIGN ESSENTIAL HYPERTENSION: ICD-10-CM

## 2024-09-02 RX ORDER — CHLORTHALIDONE 25 MG/1
25 TABLET ORAL DAILY
Qty: 90 TABLET | Refills: 0 | Status: SHIPPED | OUTPATIENT
Start: 2024-09-02

## 2025-02-18 NOTE — ANESTHESIA POSTPROCEDURE EVALUATION
Post-Anesthesia Evaluation and Assessment    Patient: Mari Goss MRN: [id number]  SSN: [id number]    YOB: 1960  Age: 59 y.o. Sex: female       Cardiovascular Function/Vital Signs  Visit Vitals    /77    Pulse 86    Temp 36.5 °C (97.7 °F)    Resp 27    SpO2 100%       Patient is status post general anesthesia for Procedure(s):  BILATERAL BREAST REDUCTION. Nausea/Vomiting: None    Postoperative hydration reviewed and adequate. Pain:  Pain Scale 1: Numeric (0 - 10) (11/21/17 1110)  Pain Intensity 1: 5 (11/21/17 1110)   Managed    Neurological Status:   Neuro (WDL): Exceptions to WDL (11/21/17 1103)  Neuro  Neurologic State: Drowsy; Pharmacologically induced (comment) (11/21/17 1103)  LUE Motor Response: Purposeful (11/21/17 1103)  LLE Motor Response: Purposeful (11/21/17 1103)  RUE Motor Response: Purposeful (11/21/17 1103)  RLE Motor Response: Purposeful (11/21/17 1103)   At baseline    Mental Status and Level of Consciousness: Arousable    Pulmonary Status:   O2 Device: Nasal cannula (11/21/17 1115)   Adequate oxygenation and airway patent    Complications related to anesthesia: None    Post-anesthesia assessment completed.  No concerns    Signed By: Priscila Coello DO     November 21, 2017 Pt lm stating she has been taking nabumetone for a while now for the gout in her hands, it was helping but lately it is not seeming to help her, her hands are having discomfort even with simple activities like scrolling on her phone.  No red meat, fish, or alcohol being consumed  Her brother is taking allopurinol and she is wondering if you would want to switch her medication or if there were other foods she may need to avoid?

## (undated) DEVICE — COVER LT HNDL BLU PLAS

## (undated) DEVICE — KENDALL SCD EXPRESS SLEEVES, KNEE LENGTH, MEDIUM: Brand: KENDALL SCD

## (undated) DEVICE — SOLUTION SURG PREP 26 CC PURPREP

## (undated) DEVICE — COVER,MAYO STAND,STERILE: Brand: MEDLINE

## (undated) DEVICE — SYRINGE MED 20ML STD CLR PLAS LUERLOCK TIP N CTRL DISP

## (undated) DEVICE — Device

## (undated) DEVICE — SUTURE MCRYL SZ 2-0 L36IN ABSRB UD L36MM CT-1 1/2 CIR Y945H

## (undated) DEVICE — Z DISCONTINUED PER MEDLINE PACK PROCEDURE SURG PLAS

## (undated) DEVICE — DRAPE,U/ SHT,SPLIT,PLAS,STERIL: Brand: MEDLINE

## (undated) DEVICE — 4-PORT MANIFOLD: Brand: NEPTUNE 2

## (undated) DEVICE — 2DSM24 2-0 UND MONODERM 30X30: Brand: 2DSM24 2-0 UND MONODERM 30X30

## (undated) DEVICE — Device: Brand: JELCO

## (undated) DEVICE — TUBING, SUCTION, 9/32" X 12', STRAIGHT: Brand: MEDLINE INDUSTRIES, INC.

## (undated) DEVICE — SCRUB DRY SURG EZ SCRUB BRUSH PREOPERATIVE GRN

## (undated) DEVICE — PENCIL ES L10FT COAT BLDE HOLSTER

## (undated) DEVICE — ASTOUND STANDARD SURGICAL GOWN, XL: Brand: CONVERTORS

## (undated) DEVICE — SUTURE ABSORBABLE BRAIDED 2-0 CT-1 27 IN UD VICRYL J259H

## (undated) DEVICE — YANKAUER,BULB TIP,W/O VENT,RIGID,STERILE: Brand: MEDLINE

## (undated) DEVICE — GLOVE SURG SZ 65 L12IN FNGR THK79MIL GRN LTX FREE

## (undated) DEVICE — C-ARM: Brand: UNBRANDED

## (undated) DEVICE — STERILE POLYISOPRENE POWDER-FREE SURGICAL GLOVES WITH EMOLLIENT COATING: Brand: PROTEXIS

## (undated) DEVICE — CLOSURE SKIN FACILITATES COMPATIBILITY W/ CERTAIN IS DSG

## (undated) DEVICE — DRAPE,CHEST,FENES,15X10,STERIL: Brand: MEDLINE

## (undated) DEVICE — PREP SKN PREVAIL 40ML APPL --

## (undated) DEVICE — Z DISCONTINUED USE 2744636  DRESSING AQUACEL 14 IN ALG W3.5XL14IN POLYUR FLM CVR W/ HYDRCOLL

## (undated) DEVICE — DEVON™ KNEE AND BODY STRAP 60" X 3" (1.5 M X 7.6 CM): Brand: DEVON

## (undated) DEVICE — SUTURE VCRL SZ 2-0 L27IN ABSRB UD L26MM SH 1/2 CIR J417H

## (undated) DEVICE — SUTURE MCRYL SZ 4 0 L18IN ABSRB VLT PS 1 L24MM 3 8 CIR REV Y682H

## (undated) DEVICE — T4 HOOD

## (undated) DEVICE — SPONGE GZ W4XL4IN COT 12 PLY TYP VII WVN C FLD DSGN STERILE

## (undated) DEVICE — TOTAL JOINT - SMH: Brand: MEDLINE INDUSTRIES, INC.

## (undated) DEVICE — 3000CC GUARDIAN II: Brand: GUARDIAN

## (undated) DEVICE — ADHESIVE SKIN CLOSURE 4X44 CM PREMIERPRO EXOFINFUSION DISP

## (undated) DEVICE — GLOVE ORTHO 8   MSG9480

## (undated) DEVICE — TRAY CATH 16F URIN MTR LTX -- CONVERT TO ITEM 363111

## (undated) DEVICE — TOWEL SURG W17XL27IN STD BLU COT NONFENESTRATED PREWASHED

## (undated) DEVICE — INFECTION CONTROL KIT SYS

## (undated) DEVICE — HANDPIECE SET WITH BONE CLEANING TIP AND SUCTION TUBE: Brand: INTERPULSE

## (undated) DEVICE — (D)PREP SKN CHLRAPRP APPL 26ML -- CONVERT TO ITEM 371833

## (undated) DEVICE — SOLUTION IRRIG 3000ML 0.9% SOD CHL FLX CONT 0797208] ICU MEDICAL INC]

## (undated) DEVICE — REM POLYHESIVE ADULT PATIENT RETURN ELECTRODE: Brand: VALLEYLAB

## (undated) DEVICE — SUTURE VCRL SZ 2 L54IN ABSRB UD L65MM TP-1 1/2 CIR J880T

## (undated) DEVICE — SYR LR LCK 1ML GRAD NSAF 30ML --

## (undated) DEVICE — CONTAINER,SPECIMEN,3OZ,OR STRL: Brand: MEDLINE

## (undated) DEVICE — RINGERFTS IV SOL

## (undated) DEVICE — PADDING CAST W6INXL4YD NONSTERILE COT RAYON MICROPLEATED

## (undated) DEVICE — HOOD, PEEL-AWAY: Brand: FLYTE

## (undated) DEVICE — SUTURE MCRYL SZ 3-0 L27IN ABSRB UD L26MM SH 1/2 CIR Y416H

## (undated) DEVICE — NDL SPNE QNCKE 18GX3.5IN LF --

## (undated) DEVICE — GLOVE SURG SZ 65 L12IN FNGR THK94MIL STD WHT LTX FREE

## (undated) DEVICE — 450 ML BOTTLE OF 0.05% CHLORHEXIDINE GLUCONATE IN 99.95% STERILE WATER FOR IRRIGATION, USP AND APPLICATOR.: Brand: IRRISEPT ANTIMICROBIAL WOUND LAVAGE

## (undated) DEVICE — PADDING CAST SPEC 6INX4YD COT --

## (undated) DEVICE — STERILE POLYISOPRENE POWDER-FREE SURGICAL GLOVES: Brand: PROTEXIS

## (undated) DEVICE — 3M™ BAIR HUGGER® UNDERBODY BLANKET, FULL ACCESS, 10 PER CASE 63500: Brand: BAIR HUGGER™

## (undated) DEVICE — GLOVE SURG SZ 8 L12IN FNGR THK94MIL STD WHT LTX FREE

## (undated) DEVICE — BLADE SAW W073XL276IN THK0031IN CUT THK0036IN REPL SAG

## (undated) DEVICE — SUTURE MCRYL SZ 4-0 L27IN ABSRB UD L19MM PS-2 1/2 CIR PRIM Y426H

## (undated) DEVICE — APPLIER LIG CLP M L11IN TI STR RNG HNDL FOR 20 CLP DISP

## (undated) DEVICE — SOLUTION IRRIG 3000ML 0.9% SOD CHL USP UROMATIC PLAS CONT

## (undated) DEVICE — STAPLER SKIN H3.9MM WIRE DIA0.58MM CRWN 6.9MM 35 STPL ROT

## (undated) DEVICE — ZIP 16 SURGICAL SKIN CLOSURE DEVICE: Brand: ZIP 16 SURGICAL SKIN CLOSURE DEVICE

## (undated) DEVICE — SOLUTION IV 50ML 0.9% SOD CHL

## (undated) DEVICE — DRAPE,REIN 53X77,STERILE: Brand: MEDLINE

## (undated) DEVICE — 6619 2 PTNT ISO SYS INCISE AREA&LT;(&GT;&&LT;)&GT;P: Brand: STERI-DRAPE™ IOBAN™ 2

## (undated) DEVICE — SOLUTION IRRIG 1000ML H2O STRL BLT

## (undated) DEVICE — DRAPE XR C ARM 41X74IN LF --

## (undated) DEVICE — SYRINGE 20ML LL S/C 50